# Patient Record
Sex: MALE | Race: OTHER
[De-identification: names, ages, dates, MRNs, and addresses within clinical notes are randomized per-mention and may not be internally consistent; named-entity substitution may affect disease eponyms.]

---

## 2018-09-11 ENCOUNTER — HOSPITAL ENCOUNTER (OUTPATIENT)
Dept: HOSPITAL 47 - OR | Age: 73
End: 2018-09-11
Attending: SURGERY
Payer: MEDICARE

## 2018-09-11 VITALS — SYSTOLIC BLOOD PRESSURE: 178 MMHG | HEART RATE: 71 BPM | DIASTOLIC BLOOD PRESSURE: 91 MMHG

## 2018-09-11 VITALS — BODY MASS INDEX: 35.4 KG/M2

## 2018-09-11 VITALS — TEMPERATURE: 97.6 F | RESPIRATION RATE: 16 BRPM

## 2018-09-11 DIAGNOSIS — K42.0: Primary | ICD-10-CM

## 2018-09-11 DIAGNOSIS — Z79.82: ICD-10-CM

## 2018-09-11 DIAGNOSIS — I11.0: ICD-10-CM

## 2018-09-11 DIAGNOSIS — I20.9: ICD-10-CM

## 2018-09-11 DIAGNOSIS — Z86.73: ICD-10-CM

## 2018-09-11 DIAGNOSIS — Z79.899: ICD-10-CM

## 2018-09-11 DIAGNOSIS — M19.90: ICD-10-CM

## 2018-09-11 DIAGNOSIS — F17.200: ICD-10-CM

## 2018-09-11 DIAGNOSIS — E78.5: ICD-10-CM

## 2018-09-11 DIAGNOSIS — I50.9: ICD-10-CM

## 2018-09-11 DIAGNOSIS — Z79.4: ICD-10-CM

## 2018-09-11 DIAGNOSIS — E11.9: ICD-10-CM

## 2018-09-11 LAB — GLUCOSE BLD-MCNC: 98 MG/DL (ref 75–99)

## 2018-09-11 PROCEDURE — 88302 TISSUE EXAM BY PATHOLOGIST: CPT

## 2018-09-11 PROCEDURE — 88305 TISSUE EXAM BY PATHOLOGIST: CPT

## 2018-09-11 PROCEDURE — 49653: CPT

## 2018-09-11 RX ADMIN — HYDROMORPHONE HYDROCHLORIDE PRN MG: 1 INJECTION, SOLUTION INTRAMUSCULAR; INTRAVENOUS; SUBCUTANEOUS at 13:15

## 2018-09-11 RX ADMIN — HYDROMORPHONE HYDROCHLORIDE PRN MG: 1 INJECTION, SOLUTION INTRAMUSCULAR; INTRAVENOUS; SUBCUTANEOUS at 13:36

## 2018-09-11 RX ADMIN — HYDROMORPHONE HYDROCHLORIDE PRN MG: 1 INJECTION, SOLUTION INTRAMUSCULAR; INTRAVENOUS; SUBCUTANEOUS at 12:52

## 2018-09-11 NOTE — P.GSHP
History of Present Illness


H&P Date: 09/11/18


Chief Complaint: Umbilical hernia





This a 73-year-old male has developed complaints of umbilical pain.  He seen in 

the office and found have a reducible umbilical hernia.  Patient presents today 

for laparoscopic robotic-assisted repair of umbilical hernia.





Past Medical History


Past Medical History: Chest Pain / Angina, Heart Failure, CVA/TIA, Diabetes 

Mellitus, Hyperlipidemia, Hypertension, Osteoarthritis (OA)


Additional Past Medical History / Comment(s): TIA-no effects, frequent 

urination and bowel movements


History of Any Multi-Drug Resistant Organisms: None Reported


Past Surgical History: No Surgical Hx Reported


Past Anesthesia/Blood Transfusion Reactions: No Reported Reaction


Additional Past Anesthesia/Blood Transfusion Reaction / Comment(s): never had 

anesthesia


Smoking Status: Current every day smoker





- Past Family History


  ** Mother


Family Medical History: Cancer


Additional Family Medical History / Comment(s): breast cancer





  ** Father


Family Medical History: Unable to Obtain





  ** Sister(s)


Family Medical History: Cancer





Medications and Allergies


 Home Medications











 Medication  Instructions  Recorded  Confirmed  Type


 


Benazepril HCl 40 mg PO W/SUPPER 12/03/17 09/05/18 History


 


glipiZIDE [Glucotrol] 20 mg PO W/LUNCH 12/03/17 09/05/18 History


 


Aspirin 81 mg PO DAILY 12/04/17 09/05/18 History


 


Insulin Glargine [Lantus] 45 unit SQ DAILY 12/04/17 09/05/18 History


 


Tamsulosin [Flomax] 0.4 mg PO HS 12/04/17 09/05/18 History


 


metFORMIN HCL ER [Glucophage Xr] 500 mg PO QID 12/04/17 09/05/18 History


 


Carvedilol [Coreg] 6.25 mg PO BID 09/05/18 09/11/18 History


 


Furosemide [Lasix] 40 mg PO BID 09/05/18 09/05/18 History


 


Isosorbide Mononitrate ER [Imdur] 30 mg PO W/LUNCH 09/05/18 09/05/18 History


 


Lovastatin [Mevacor] 40 mg PO HS 09/05/18 09/05/18 History


 


Spironolactone [Aldactone] 25 mg PO W/SUPPER 09/05/18 09/05/18 History











 Allergies











Allergy/AdvReac Type Severity Reaction Status Date / Time


 


No Known Allergies Allergy   Verified 09/11/18 10:26














Surgical - Exam


 Vital Signs











Temp Pulse Resp BP Pulse Ox


 


 97.8 F   69   18   172/77   96 


 


 09/11/18 10:18  09/11/18 10:18  09/11/18 10:18  09/11/18 10:18  09/11/18 10:18














- General


well developed, no distress





- Eyes


PERRL





- ENT


normal pinna





- Neck


no masses





- Respiratory


normal expansion





- Cardiovascular


Rhythm: regular





- Abdomen


Abdomen: soft, non tender


Hernia: umbilical





Assessment and Plan


Assessment: 





We'll perform laparoscopic robotic system repair of umbilical hernia.

## 2018-09-11 NOTE — P.OP
Date of Procedure: 09/11/18


Preoperative Diagnosis: 


Incarcerated umbilical hernia


Postoperative Diagnosis: 


Incarcerated umbilical hernia


Procedure(s) Performed: 


Laparoscopic robotic-assisted repair of incarcerated hernia





Partial omentectomy


Anesthesia: ROSE


Surgeon: Ish Gaitan


Estimated Blood Loss (ml): 5


Pathology: other (Omentum)


Condition: stable


Disposition: PACU


Description of Procedure: 


The patient was placed on the operating table in the supine position.  He 

received general anesthesia.  His abdomen was prepped and draped usual fashion.

  Using a 5 mm optical trocar under direct visualization the peritoneal cavity 

was entered in the left upper quadrant.  The abdomen was then insufflated.  The 

laparoscope was placed back into the perineal cavity.  Next a 8 mm robotic 

trocar was placed in the left lower quadrant and a 12 mm robotic trocar was 

placed in the left lateral position.  The original 5 mm trocar was exchanged 

for a 8 mm robotic trocar.  The patient's placed in the left side up position.  

And the patient was undocked the robot.





The umbilical hernia was visualized.  Using the cautery the incarcerated 

omentum was reduced and transected.  Using hook cautery the peritoneum over the 

umbilical hernia was excised.  The fascial opening was repaired using 0V LOC 

suture.  Next a piece of 11 cm round ventral light ST mesh was placed into the.

  Cavity and secured with 2 OV lock suture.





The patient was undocked the robot.  The needles were retrieved.  The omentum 

was retrieved.  The fascia of the 12 mm trocar site was closed with 0 Ethibond 

suture.  Skin was closed interrupted 3-0 Monocryl suture.  Dermabond dressings 

was applied.  Patient top procedure well and was sent to recovery room stable 

condition.

## 2018-11-29 ENCOUNTER — HOSPITAL ENCOUNTER (INPATIENT)
Dept: HOSPITAL 47 - EC | Age: 73
LOS: 4 days | Discharge: HOME | DRG: 291 | End: 2018-12-03
Payer: MEDICARE

## 2018-11-29 VITALS — BODY MASS INDEX: 32 KG/M2

## 2018-11-29 DIAGNOSIS — E11.22: ICD-10-CM

## 2018-11-29 DIAGNOSIS — E78.5: ICD-10-CM

## 2018-11-29 DIAGNOSIS — I13.0: Primary | ICD-10-CM

## 2018-11-29 DIAGNOSIS — Z79.4: ICD-10-CM

## 2018-11-29 DIAGNOSIS — T50.2X5A: ICD-10-CM

## 2018-11-29 DIAGNOSIS — I25.5: ICD-10-CM

## 2018-11-29 DIAGNOSIS — Z80.3: ICD-10-CM

## 2018-11-29 DIAGNOSIS — E66.9: ICD-10-CM

## 2018-11-29 DIAGNOSIS — Z86.73: ICD-10-CM

## 2018-11-29 DIAGNOSIS — I16.1: ICD-10-CM

## 2018-11-29 DIAGNOSIS — Z79.82: ICD-10-CM

## 2018-11-29 DIAGNOSIS — I50.23: ICD-10-CM

## 2018-11-29 DIAGNOSIS — I25.10: ICD-10-CM

## 2018-11-29 DIAGNOSIS — I08.3: ICD-10-CM

## 2018-11-29 DIAGNOSIS — N17.9: ICD-10-CM

## 2018-11-29 DIAGNOSIS — I25.2: ICD-10-CM

## 2018-11-29 DIAGNOSIS — J96.01: ICD-10-CM

## 2018-11-29 DIAGNOSIS — F17.200: ICD-10-CM

## 2018-11-29 DIAGNOSIS — Z79.899: ICD-10-CM

## 2018-11-29 DIAGNOSIS — N18.9: ICD-10-CM

## 2018-11-29 DIAGNOSIS — Z91.128: ICD-10-CM

## 2018-11-29 DIAGNOSIS — T50.2X6A: ICD-10-CM

## 2018-11-29 DIAGNOSIS — D50.9: ICD-10-CM

## 2018-11-29 DIAGNOSIS — M19.90: ICD-10-CM

## 2018-11-29 DIAGNOSIS — Z71.6: ICD-10-CM

## 2018-11-29 DIAGNOSIS — I44.7: ICD-10-CM

## 2018-11-29 DIAGNOSIS — E78.00: ICD-10-CM

## 2018-11-29 LAB
ALBUMIN SERPL-MCNC: 3.1 G/DL (ref 3.5–5)
ALP SERPL-CCNC: 134 U/L (ref 38–126)
ALT SERPL-CCNC: 31 U/L (ref 21–72)
ANION GAP SERPL CALC-SCNC: 8 MMOL/L
APTT BLD: 22.5 SEC (ref 22–30)
AST SERPL-CCNC: 30 U/L (ref 17–59)
BASOPHILS # BLD AUTO: 0 K/UL (ref 0–0.2)
BASOPHILS NFR BLD AUTO: 0 %
BUN SERPL-SCNC: 25 MG/DL (ref 9–20)
CALCIUM SPEC-MCNC: 8 MG/DL (ref 8.4–10.2)
CHLORIDE SERPL-SCNC: 111 MMOL/L (ref 98–107)
CO2 SERPL-SCNC: 23 MMOL/L (ref 22–30)
EOSINOPHIL # BLD AUTO: 0.2 K/UL (ref 0–0.7)
EOSINOPHIL NFR BLD AUTO: 3 %
ERYTHROCYTE [DISTWIDTH] IN BLOOD BY AUTOMATED COUNT: 4.35 M/UL (ref 4.3–5.9)
ERYTHROCYTE [DISTWIDTH] IN BLOOD: 15.3 % (ref 11.5–15.5)
GLUCOSE BLD-MCNC: 180 MG/DL (ref 75–99)
GLUCOSE BLD-MCNC: 230 MG/DL (ref 75–99)
GLUCOSE BLD-MCNC: 263 MG/DL (ref 75–99)
GLUCOSE BLD-MCNC: 317 MG/DL (ref 75–99)
GLUCOSE SERPL-MCNC: 180 MG/DL (ref 74–99)
HBA1C MFR BLD: 6.7 % (ref 4–6)
HCT VFR BLD AUTO: 35.4 % (ref 39–53)
HGB BLD-MCNC: 10.9 GM/DL (ref 13–17.5)
INR PPP: 1.1 (ref ?–1.2)
LYMPHOCYTES # SPEC AUTO: 1.8 K/UL (ref 1–4.8)
LYMPHOCYTES NFR SPEC AUTO: 19 %
MCH RBC QN AUTO: 25 PG (ref 25–35)
MCHC RBC AUTO-ENTMCNC: 30.7 G/DL (ref 31–37)
MCV RBC AUTO: 81.4 FL (ref 80–100)
MONOCYTES # BLD AUTO: 0.6 K/UL (ref 0–1)
MONOCYTES NFR BLD AUTO: 6 %
NEUTROPHILS # BLD AUTO: 6.6 K/UL (ref 1.3–7.7)
NEUTROPHILS NFR BLD AUTO: 70 %
PH UR: 5.5 [PH] (ref 5–8)
PLATELET # BLD AUTO: 237 K/UL (ref 150–450)
POTASSIUM SERPL-SCNC: 4.8 MMOL/L (ref 3.5–5.1)
PROT SERPL-MCNC: 6.5 G/DL (ref 6.3–8.2)
PROT UR QL: (no result)
PT BLD: 10.6 SEC (ref 9–12)
RBC UR QL: 1 /HPF (ref 0–5)
SODIUM SERPL-SCNC: 142 MMOL/L (ref 137–145)
SP GR UR: 1.01 (ref 1–1.03)
SQUAMOUS UR QL AUTO: <1 /HPF (ref 0–4)
UROBILINOGEN UR QL STRIP: <2 MG/DL (ref ?–2)
WBC # BLD AUTO: 9.4 K/UL (ref 3.8–10.6)
WBC #/AREA URNS HPF: 2 /HPF (ref 0–5)

## 2018-11-29 PROCEDURE — 94660 CPAP INITIATION&MGMT: CPT

## 2018-11-29 PROCEDURE — 85610 PROTHROMBIN TIME: CPT

## 2018-11-29 PROCEDURE — 83540 ASSAY OF IRON: CPT

## 2018-11-29 PROCEDURE — 96374 THER/PROPH/DIAG INJ IV PUSH: CPT

## 2018-11-29 PROCEDURE — 87077 CULTURE AEROBIC IDENTIFY: CPT

## 2018-11-29 PROCEDURE — 5A09457 ASSISTANCE WITH RESPIRATORY VENTILATION, 24-96 CONSECUTIVE HOURS, CONTINUOUS POSITIVE AIRWAY PRESSURE: ICD-10-PCS

## 2018-11-29 PROCEDURE — 87186 SC STD MICRODIL/AGAR DIL: CPT

## 2018-11-29 PROCEDURE — 83735 ASSAY OF MAGNESIUM: CPT

## 2018-11-29 PROCEDURE — 87040 BLOOD CULTURE FOR BACTERIA: CPT

## 2018-11-29 PROCEDURE — 87086 URINE CULTURE/COLONY COUNT: CPT

## 2018-11-29 PROCEDURE — 71045 X-RAY EXAM CHEST 1 VIEW: CPT

## 2018-11-29 PROCEDURE — 85025 COMPLETE CBC W/AUTO DIFF WBC: CPT

## 2018-11-29 PROCEDURE — 81001 URINALYSIS AUTO W/SCOPE: CPT

## 2018-11-29 PROCEDURE — 83550 IRON BINDING TEST: CPT

## 2018-11-29 PROCEDURE — 85730 THROMBOPLASTIN TIME PARTIAL: CPT

## 2018-11-29 PROCEDURE — 87502 INFLUENZA DNA AMP PROBE: CPT

## 2018-11-29 PROCEDURE — 82728 ASSAY OF FERRITIN: CPT

## 2018-11-29 PROCEDURE — 96376 TX/PRO/DX INJ SAME DRUG ADON: CPT

## 2018-11-29 PROCEDURE — 83605 ASSAY OF LACTIC ACID: CPT

## 2018-11-29 PROCEDURE — 80053 COMPREHEN METABOLIC PANEL: CPT

## 2018-11-29 PROCEDURE — 83036 HEMOGLOBIN GLYCOSYLATED A1C: CPT

## 2018-11-29 PROCEDURE — 93306 TTE W/DOPPLER COMPLETE: CPT

## 2018-11-29 PROCEDURE — 84484 ASSAY OF TROPONIN QUANT: CPT

## 2018-11-29 PROCEDURE — 93005 ELECTROCARDIOGRAM TRACING: CPT

## 2018-11-29 PROCEDURE — 36415 COLL VENOUS BLD VENIPUNCTURE: CPT

## 2018-11-29 PROCEDURE — 99291 CRITICAL CARE FIRST HOUR: CPT

## 2018-11-29 RX ADMIN — FUROSEMIDE SCH MG: 10 INJECTION, SOLUTION INTRAMUSCULAR; INTRAVENOUS at 06:36

## 2018-11-29 RX ADMIN — FUROSEMIDE SCH MG: 10 INJECTION, SOLUTION INTRAMUSCULAR; INTRAVENOUS at 20:26

## 2018-11-29 RX ADMIN — INSULIN DETEMIR SCH UNIT: 100 INJECTION, SOLUTION SUBCUTANEOUS at 13:02

## 2018-11-29 RX ADMIN — ATORVASTATIN CALCIUM SCH MG: 10 TABLET, FILM COATED ORAL at 20:26

## 2018-11-29 RX ADMIN — HEPARIN SODIUM SCH UNIT: 5000 INJECTION, SOLUTION INTRAVENOUS; SUBCUTANEOUS at 20:26

## 2018-11-29 RX ADMIN — INSULIN ASPART SCH: 100 INJECTION, SOLUTION INTRAVENOUS; SUBCUTANEOUS at 20:39

## 2018-11-29 RX ADMIN — CARVEDILOL SCH MG: 6.25 TABLET, FILM COATED ORAL at 10:50

## 2018-11-29 RX ADMIN — INSULIN ASPART SCH UNIT: 100 INJECTION, SOLUTION INTRAVENOUS; SUBCUTANEOUS at 13:00

## 2018-11-29 RX ADMIN — FUROSEMIDE SCH MG: 10 INJECTION, SOLUTION INTRAMUSCULAR; INTRAVENOUS at 13:03

## 2018-11-29 RX ADMIN — SPIRONOLACTONE SCH MG: 25 TABLET, FILM COATED ORAL at 19:44

## 2018-11-29 RX ADMIN — LISINOPRIL SCH MG: 20 TABLET ORAL at 19:44

## 2018-11-29 RX ADMIN — INSULIN ASPART SCH UNIT: 100 INJECTION, SOLUTION INTRAVENOUS; SUBCUTANEOUS at 10:27

## 2018-11-29 RX ADMIN — CARVEDILOL SCH MG: 6.25 TABLET, FILM COATED ORAL at 19:44

## 2018-11-29 RX ADMIN — INSULIN ASPART SCH UNIT: 100 INJECTION, SOLUTION INTRAVENOUS; SUBCUTANEOUS at 20:38

## 2018-11-29 RX ADMIN — TAMSULOSIN HYDROCHLORIDE SCH MG: 0.4 CAPSULE ORAL at 20:26

## 2018-11-29 NOTE — P.CRDCN
History of Present Illness


Consult date: 11/29/18


History of present illness: 





This is a 73-year-old gentleman with history of hypertension, chronic 

congestive heart failure and known ischemic heart disease with previous 

inferior wall myocardial infarction, who was brought to the hospital this time 

with complaints of increasing shortness of breath.  Apparently patient stopped 

taking diuretics for about a week or so and has been eating salty food.  He 

developed a progressively increasing shortness of breath and orthopnea.  Denied 

any chest pain, palpitation, dizziness or syncope.  His EKG showed sinus rhythm 

with newly found left bundle branch block pattern.  Chest x-ray showed findings 

consistent with pulmonary edema.  Patient was treated with IV Lasix with good 

diuresis.  Patient is feeling better.  Patient is going to have an 

echocardiogram.  We'll follow his cardiac enzymes.  Further recommendations 

depend upon the clinical course.





Review of Systems





As per the chart





Past Medical History


Past Medical History: Chest Pain / Angina, Heart Failure, CVA/TIA, Diabetes 

Mellitus, Hyperlipidemia, Hypertension, Osteoarthritis (OA)


Additional Past Medical History / Comment(s): TIA, IDDM type II.


History of Any Multi-Drug Resistant Organisms: None Reported


Past Surgical History: Hernia Repair


Additional Past Surgical History / Comment(s): 9/11/18 robot assisted 

laprascopic umbilical hernia repair with mesh.


Past Anesthesia/Blood Transfusion Reactions: No Reported Reaction


Additional Past Anesthesia/Blood Transfusion Reaction / Comment(s): never had 

anesthesia


Smoking Status: Light tobacco smoker





- Past Family History


  ** Mother


Family Medical History: Cancer


Additional Family Medical History / Comment(s): breast cancer





  ** Father


Family Medical History: Unable to Obtain





  ** Sister(s)


Family Medical History: Cancer





Medications and Allergies


 Home Medications











 Medication  Instructions  Recorded  Confirmed  Type


 


Benazepril HCl 40 mg PO DAILY 12/03/17 11/29/18 History


 


glipiZIDE [Glucotrol] 20 mg PO W/BRKFST 12/03/17 11/29/18 History


 


Aspirin 81 mg PO DAILY 12/04/17 11/29/18 History


 


Insulin Glargine [Lantus] 40 unit SQ DAILY 12/04/17 11/29/18 History


 


Tamsulosin [Flomax] 0.4 mg PO HS 12/04/17 11/29/18 History


 


metFORMIN HCL ER [Glucophage Xr] 1,000 mg PO BID 12/04/17 11/29/18 History


 


Carvedilol [Coreg] 6.25 mg PO BID 09/05/18 11/29/18 History


 


Furosemide [Lasix] 40 mg PO BID 09/05/18 11/29/18 History


 


Isosorbide Mononitrate ER [Imdur] 30 mg PO DAILY 09/05/18 11/29/18 History


 


Lovastatin [Mevacor] 20 mg PO HS 09/05/18 11/29/18 History


 


Spironolactone [Aldactone] 25 mg PO DAILY 09/05/18 11/29/18 History











 Allergies











Allergy/AdvReac Type Severity Reaction Status Date / Time


 


No Known Allergies Allergy   Verified 11/29/18 08:52














Physical Exam


Vitals: 


 Vital Signs











  Temp Pulse Resp BP Pulse Ox


 


 11/29/18 13:05   86  20  189/87  95


 


 11/29/18 10:25   85  18  180/96  95


 


 11/29/18 07:00   84  23  163/87  99


 


 11/29/18 06:00   79  12  142/66  96


 


 11/29/18 05:00   82  19  123/62  96


 


 11/29/18 04:15   90  19  106/93  98


 


 11/29/18 04:00   92  19  132/70  97


 


 11/29/18 03:08   103 H  21  170/94  99


 


 11/29/18 03:00   107 H  10 L  170/94  99


 


 11/29/18 02:47    27 H  


 


 11/29/18 02:34   117 H  29 H  201/114  99


 


 11/29/18 02:30  97.4 F L  121 H  27 H  201/114  98








 Intake and Output











 11/28/18 11/29/18 11/29/18





 22:59 06:59 14:59


 


Output Total   4900


 


Balance   -4900


 


Output:   


 


  Urine   4000


 


  Urine/Stool Mix   900


 


Other:   


 


  Weight  106.594 kg 














GENERAL EXAM: Patient is alert and oriented and appears to be in mild-to-

moderate distress, normal


HEENT: Normocephalic. Normal reaction of pupils, equal size, normal range of 

extraocular motion. No erythema or exudates in the throat.


NECK: No masses, no nuchal rigidity.


CHEST: No chest wall deformity.


LUNGS: Decreased breath sounds at bases.  Few rales


HEART: [S1 and S2 normal with no audible mumurs or gallops. Regular rhythm,


ABDOMEN: No hepatosplenomegaly, normal bowel sounds, no guarding or rigidity.


SKIN: No rashes


CENTRAL NERVOUS SYSTEM: No focal deficits.


EXTREMITIES: No cyanosis, clubbing or edema.





Results





 11/29/18 02:33





 11/29/18 02:33


 Cardiac Enzymes











  11/29/18 11/29/18 Range/Units





  02:33 02:33 


 


AST  30   (17-59)  U/L


 


Troponin I   <0.012  (0.000-0.034)  ng/mL








 Coagulation











  11/29/18 Range/Units





  02:33 


 


PT  10.6  (9.0-12.0)  sec


 


APTT  22.5  (22.0-30.0)  sec








 CBC











  11/29/18 Range/Units





  02:33 


 


WBC  9.4  (3.8-10.6)  k/uL


 


RBC  4.35  (4.30-5.90)  m/uL


 


Hgb  10.9 L  (13.0-17.5)  gm/dL


 


Hct  35.4 L  (39.0-53.0)  %


 


Plt Count  237  (150-450)  k/uL








 Comprehensive Metabolic Panel











  11/29/18 Range/Units





  02:33 


 


Sodium  142  (137-145)  mmol/L


 


Potassium  4.8  (3.5-5.1)  mmol/L


 


Chloride  111 H  ()  mmol/L


 


Carbon Dioxide  23  (22-30)  mmol/L


 


BUN  25 H  (9-20)  mg/dL


 


Creatinine  1.19  (0.66-1.25)  mg/dL


 


Glucose  180 H  (74-99)  mg/dL


 


Calcium  8.0 L  (8.4-10.2)  mg/dL


 


AST  30  (17-59)  U/L


 


ALT  31  (21-72)  U/L


 


Alkaline Phosphatase  134 H  ()  U/L


 


Total Protein  6.5  (6.3-8.2)  g/dL


 


Albumin  3.1 L  (3.5-5.0)  g/dL








 Current Medications











Generic Name Dose Route Start Last Admin





  Trade Name Freq  PRN Reason Stop Dose Admin


 


Hydrocodone Bitart/Acetaminophen  1 each  11/29/18 04:47  





  Almond 7.5-325  PO   





  Q4H PRN   





  Pain   





     





     





     


 


Aspirin  325 mg  11/30/18 04:47  





  Aspirin  PO   





  DAILY [facility]   





     





     





     





     


 


Atorvastatin Calcium  10 mg  11/29/18 21:00  





  Lipitor  PO   





  HS [facility]   





     





     





     





     


 


Carvedilol  6.25 mg  11/29/18 07:30  11/29/18 10:50





  Coreg  PO   6.25 mg





  BID-W/MEALS [facility]   Administration





     





     





     





     


 


Famotidine  20 mg  11/30/18 09:00  





  Pepcid  PO   





  DAILY [facility]   





     





     





     





     


 


Furosemide  40 mg  11/29/18 05:00  11/29/18 13:03





  Lasix  IV   40 mg





  Q8H [facility]   Administration





     





     





     





     


 


Glipizide  20 mg  11/30/18 07:30  





  Glucotrol  PO   





  W/BRKFST [facility]   





     





     





     





     


 


Heparin Sodium (Porcine)  5,000 unit  11/29/18 21:00  





  Heparin  SQ   





  Q12HR [facility]   





     





     





     





     


 


Insulin Aspart  0 unit  11/29/18 07:30  11/29/18 13:00





  Novolog  SQ   7 unit





  [facility]   Administration





     





     





  Protocol   





     


 


Insulin Detemir  40 unit  11/29/18 12:00  11/29/18 13:02





  Levemir  SQ   40 unit





  DAILY [facility]   Administration





     





     





     





     


 


Isosorbide Mononitrate  30 mg  11/30/18 09:00  





  Imdur  PO   





  DAILY [facility]   





     





     





     





     


 


Lisinopril  40 mg  11/29/18 17:30  





  Zestril  PO   





  W/SUPPER [facility]   





     





     





     





     


 


Naloxone HCl  0.2 mg  11/29/18 04:45  





  Narcan  IV   





  Q2M PRN   





  Opioid Reversal   





     





     





     


 


Spironolactone  25 mg  11/29/18 17:30  





  Aldactone  PO   





  W/SUPPER [facility]   





     





     





     





     


 


Tamsulosin HCl  0.4 mg  11/29/18 21:00  





  Flomax  PO   





  HS [facility]   





     





     





     





     








 Intake and Output











 11/28/18 11/29/18 11/29/18





 22:59 06:59 14:59


 


Output Total   4900


 


Balance   -4900


 


Output:   


 


  Urine   4000


 


  Urine/Stool Mix   900


 


Other:   


 


  Weight  106.594 kg 








 





 11/29/18 02:33 





 11/29/18 02:33 











EKG Interpretations (text)





Sinus rhythm with a left bundle branch block pattern





Assessment and Plan


(1) Pulmonary edema


Current Visit: Yes   Status: Acute   Code(s): J81.1 - CHRONIC PULMONARY EDEMA   

SNOMED Code(s): 28752386


   





(2) Left bundle branch block


Current Visit: Yes   Status: Acute   Code(s): I44.7 - LEFT BUNDLE-BRANCH BLOCK, 

UNSPECIFIED   SNOMED Code(s): 35716901


   





(3) Coronary artery disease


Current Visit: Yes   Status: Acute   Code(s): I25.10 - ATHSCL HEART DISEASE OF 

NATIVE CORONARY ARTERY W/O ANG PCTRS   SNOMED Code(s): 35124113


   





(4) Ischemic cardiomyopathy


Current Visit: Yes   Status: Acute   Code(s): I25.5 - ISCHEMIC CARDIOMYOPATHY   

SNOMED Code(s): 357279711


   





(5) Hypertension, essential


Current Visit: Yes   Status: Acute   Code(s): I10 - ESSENTIAL (PRIMARY) 

HYPERTENSION   SNOMED Code(s): 97295328


   





(6) Hypercholesterolemia


Current Visit: Yes   Status: Acute   Code(s): E78.00 - PURE HYPERCHOLESTEROLEMIA

, UNSPECIFIED   SNOMED Code(s): 16989896


   


Plan: 


We'll continue with current medical therapy with IV diuretics, Coreg, ACE 

inhibitor and also Aldactone.  We'll follow Cardec enzymes studies.  We'll 

repeat the echocardiogram.  Further recommendation depend upon the clinical 

course

## 2018-11-29 NOTE — XR
EXAMINATION TYPE: XR chest 1V portable

 

DATE OF EXAM: 11/29/2018

 

COMPARISON: 12/5/2017

 

HISTORY: Heart failure short of breath

 

TECHNIQUE: Single frontal view of the chest is obtained.

 

FINDINGS:  Heart is enlarged. There is pulmonary interstitial edema. Thoracic aorta is atheromatous. 
There are chest leads. I see no pleural fluid.

 

IMPRESSION:  There is new pulmonary interstitial edema compared to old exam and suggestive of acute c
ongestive heart failure.

## 2018-11-29 NOTE — ECHOF
Referral Reason:CHF, check EF



MEASUREMENTS

--------

HEIGHT: 180.3 cm

WEIGHT: 106.6 kg

BP: 

IVSd:   1.5 cm     (0.6 - 1.1)

LVIDd:   5.2 cm     (3.9 - 5.3)

LVPWd:   1.3 cm     (0.6 - 1.1)

IVSs:   2.1 cm

LVIDs:   4.2 cm

LVPWs:   1.8 cm

Ao Diam:   2.7 cm     (2.0 - 3.7)

AV Cusp:   1.2 cm     (1.5 - 2.6)

LA Diam:   3.2 cm     (2.7 - 3.8)

MV EXCURSION:   12.148 mm     (> 18.000)

MV EF SLOPE:   48 mm/s     (70 - 150)

EPSS:   1.4 cm

MV E Manuel:   0.87 m/s

MV DecT:   121 ms

MV A Manuel:   0.77 m/s

MV E/A Ratio:   1.14 

AV maxP.13 mmHg

AV meanPG:   10.09 mmHg

RAP:   5.00 mmHg

RVSP:   10.25 mmHg







FINDINGS

--------

Sinus rhythm.

This was a technically difficult study with suboptimal views.

The left ventricular size is normal.   There is moderate concentric left ventricular hypertrophy.   O
verall left ventricular systolic function is mild-moderately impaired with, an EF between 40 - 45 %. 
  Sigmoid shaped septum with focal hypertrophy of the basal septum. The remaining wall thickness is n
ormal.   Basal lateral LV wall motion is hypokinetic.    Basal inferior LV wall motion is hypokinetic
.    Mid lateral LV wall motion is hypokinetic.    Mid inferior LV wall motion is hypokinetic.    Api
azul inferior LV wall motion is hypokinetic.

The right ventricle is normal in size and function.

The left atrium is normal in size.

The right atrium is normal in size.

Lumason used

Aortic valve is trileaflet and is mildly thickened.   There is mild aortic stenosis present.   Peak/m
zion gradient across the Aortic Valve is 18.13mmHg / 10.09mmHg.

Mild mitral annular calcification present.   Mild mitral regurgitation is present.

Mild tricuspid regurgitation present.   There is no evidence of pulmonary hypertension.   The right v
entricular systolic pressure, as measured by Doppler, is 10.25mmHg.

The pulmonic valve was not well visualized.   There is no pulmonic regurgitation present.

There is no pericardial effusion.



CONCLUSIONS

--------

1. Sinus rhythm.

2. This was a technically difficult study with suboptimal views.

3. The left ventricular size is normal.

4. There is moderate concentric left ventricular hypertrophy.

5. Overall left ventricular systolic function is mild-moderately impaired with, an EF between 40 - 45
 %.

6. Sigmoid shaped septum with focal hypertrophy of the basal septum. The remaining wall thickness is 
normal.

7. Basal lateral LV wall motion is hypokinetic.

8. Basal inferior LV wall motion is hypokinetic.

9. Mid lateral LV wall motion is hypokinetic.

10. Mid inferior LV wall motion is hypokinetic.

11. Apical inferior LV wall motion is hypokinetic.

12. The left atrium is normal in size.

13. Lumason used

14. Aortic valve is trileaflet and is mildly thickened.

15. There is mild aortic stenosis present.

16. Peak/mean gradient across the Aortic Valve is 18.13mmHg / 10.09mmHg.

17. Mild mitral annular calcification present.

18. Mild mitral regurgitation is present.

19. Mild tricuspid regurgitation present.

20. There is no evidence of pulmonary hypertension.

21. There is no pulmonic regurgitation present.

22. There is no pericardial effusion.





SONOGRAPHER: Yaima Navarrete RDCS

## 2018-11-29 NOTE — P.HPIM
History of Present Illness


H&P Date: 11/29/18


Chief Complaint: Shortness of breath





This is a 73-year-old male, patient of Dr. Grimaldo.  Patient has a known past 

medical history of congestive heart failure, TIA, diabetes, hypertension, 

hyperlipidemia, osteoarthritis and nicotine dependence.  Patient presents to 

the emergency room with complaints of shortness of breath and evidence of 

congestive heart failure.  Patient reports the shortness of breath started 

around midnight last night.  He was concerned and called EMS.  Per ER reports 

upon EMS evaluation patient was in acute respiratory failure.  Oxygen 

saturations were in the 70s and radials noted on lung exam.  He was placed on 

CPAP given nitro and DuoNeb as well as IV Solu-Medrol in route.  He required to 

be on BiPAP in the ER.  Patient was started on IV Lasix for congestive heart 

failure and pulmonary edema.  Chest x-ray showed evidence of congestive heart 

failure.  Patient did have significant elevated blood pressure and was 

tachycardic on admission.  Blood pressure 201/114 and heart rate 121.  Patient 

started on IV Lasix.  Respiratory symptoms are improving.  He is currently on 2 

L of oxygen satting at 95%.  Cardiology has been placed on consult.  Patient 

stopped taking his oral Lasix about 2 weeks ago by his physician.  Patient 

reports eating a half of sausage yesterday, in which she knows is very salty.  

And blames this for contributing to his fluid overload.  Patient denies any 

chest pain, fever, chills, sweats, nausea or vomiting, bowel movement changes 

or urinary symptoms.  Holloway catheter inserted in ER due to him being on IV 

Lasix.  Cardiology has been placed on consult for CHF exacerbation.





Review of Systems





Please refer to HPI otherwise unremarkable





Past Medical History


Past Medical History: Chest Pain / Angina, Heart Failure, CVA/TIA, Diabetes 

Mellitus, Hyperlipidemia, Hypertension, Osteoarthritis (OA)


Additional Past Medical History / Comment(s): TIA-no effects, frequent 

urination and bowel movements


History of Any Multi-Drug Resistant Organisms: None Reported


Past Surgical History: No Surgical Hx Reported


Past Anesthesia/Blood Transfusion Reactions: No Reported Reaction


Additional Past Anesthesia/Blood Transfusion Reaction / Comment(s): never had 

anesthesia


Smoking Status: Current every day smoker





- Past Family History


  ** Mother


Family Medical History: Cancer


Additional Family Medical History / Comment(s): breast cancer





  ** Father


Family Medical History: Unable to Obtain





  ** Sister(s)


Family Medical History: Cancer





Medications and Allergies


 Home Medications











 Medication  Instructions  Recorded  Confirmed  Type


 


Benazepril HCl 40 mg PO DAILY 12/03/17 11/29/18 History


 


glipiZIDE [Glucotrol] 20 mg PO W/BRKFST 12/03/17 11/29/18 History


 


Aspirin 81 mg PO DAILY 12/04/17 11/29/18 History


 


Insulin Glargine [Lantus] 40 unit SQ DAILY 12/04/17 11/29/18 History


 


Tamsulosin [Flomax] 0.4 mg PO HS 12/04/17 11/29/18 History


 


metFORMIN HCL ER [Glucophage Xr] 1,000 mg PO BID 12/04/17 11/29/18 History


 


Carvedilol [Coreg] 6.25 mg PO BID 09/05/18 11/29/18 History


 


Furosemide [Lasix] 40 mg PO BID 09/05/18 11/29/18 History


 


Isosorbide Mononitrate ER [Imdur] 30 mg PO DAILY 09/05/18 11/29/18 History


 


Lovastatin [Mevacor] 20 mg PO HS 09/05/18 11/29/18 History


 


Spironolactone [Aldactone] 25 mg PO DAILY 09/05/18 11/29/18 History











 Allergies











Allergy/AdvReac Type Severity Reaction Status Date / Time


 


No Known Allergies Allergy   Verified 11/29/18 08:52














Physical Exam


Vitals: 


 Vital Signs











  Temp Pulse Resp BP Pulse Ox


 


 11/29/18 10:25   85  18  180/96  95


 


 11/29/18 07:00   84  23  163/87  99


 


 11/29/18 06:00   79  12  142/66  96


 


 11/29/18 05:00   82  19  123/62  96


 


 11/29/18 04:15   90  19  106/93  98


 


 11/29/18 04:00   92  19  132/70  97


 


 11/29/18 03:08   103 H  21  170/94  99


 


 11/29/18 03:00   107 H  10 L  170/94  99


 


 11/29/18 02:47    27 H  


 


 11/29/18 02:34   117 H  29 H  201/114  99


 


 11/29/18 02:30  97.4 F L  121 H  27 H  201/114  98








 Intake and Output











 11/28/18 11/29/18 11/29/18





 22:59 06:59 14:59


 


Output Total   3100


 


Balance   -3100


 


Output:   


 


  Urine   3100


 


Other:   


 


  Weight  106.594 kg 














Head normocephalic


Neck supple


Lungs crackles at bases


Heart regular rate and rhythm S1-S2, no rub or gallop


Abdomen is soft nontender nondistended positive bowel sounds no 

hepatosplenomegaly


Extremities no edema


Neuro alert and orientated to 3





Results


CBC & Chem 7: 


 11/29/18 02:33





 11/29/18 02:33


Labs: 


 Abnormal Lab Results - Last 24 Hours (Table)











  11/29/18 11/29/18 11/29/18 Range/Units





  02:32 02:33 02:33 


 


Hgb   10.9 L   (13.0-17.5)  gm/dL


 


Hct   35.4 L   (39.0-53.0)  %


 


MCHC   30.7 L   (31.0-37.0)  g/dL


 


Chloride    111 H  ()  mmol/L


 


BUN    25 H  (9-20)  mg/dL


 


Glucose    180 H  (74-99)  mg/dL


 


POC Glucose (mg/dL)  180 H    (75-99)  mg/dL


 


Calcium    8.0 L  (8.4-10.2)  mg/dL


 


Alkaline Phosphatase    134 H  ()  U/L


 


Albumin    3.1 L  (3.5-5.0)  g/dL


 


Urine Protein     (Negative)  


 


Urine Mucus     (None)  /hpf














  11/29/18 11/29/18 Range/Units





  04:30 09:51 


 


Hgb    (13.0-17.5)  gm/dL


 


Hct    (39.0-53.0)  %


 


MCHC    (31.0-37.0)  g/dL


 


Chloride    ()  mmol/L


 


BUN    (9-20)  mg/dL


 


Glucose    (74-99)  mg/dL


 


POC Glucose (mg/dL)   230 H  (75-99)  mg/dL


 


Calcium    (8.4-10.2)  mg/dL


 


Alkaline Phosphatase    ()  U/L


 


Albumin    (3.5-5.0)  g/dL


 


Urine Protein  2+ H   (Negative)  


 


Urine Mucus  Rare H   (None)  /hpf








 Microbiology - Last 24 Hours (Table)











 11/29/18 04:30 Urine Culture - Preliminary





 Urine,Voided 














Assessment and Plan


Assessment: 





1.  Acute hypoxic respiratory failure secondary to congestive heart failure 

exacerbation and pulmonary edema





2.  Acute on chronic systolic CHF exacerbation: Patient started on IV Lasix 40 

mg IV every 8 hours.  Cardiology on consult.  Check 2-D echo.  Echo from 

December 2017 shows an EF of 40-45%





3.  History of diabetes mellitus type 2: Check A1c.  Resume patient's Lantus 

and glipizide.  Hold metformin during hospitalization.  Add Humalog sliding 

scale coverage.





4.  Essential hypertension





5.  Hypertensive emergency on admission likely due to patient's respiratory 

status.  Patient's blood pressures have improved.





6.  Sinus tachycardia again likely related to patient's respiratory status now 

improved





8.  History of TIA





9.  History of hyperlipidemia





10.  Nicotine dependence: Discussed smoking cessation for greater than 3 

minutes.  Patient refusing nicotine patch at this time.





11.  Anemia: Hemoglobin 10.9 on admission.  No evidence of bleeding.  Check 

iron studies.





GI prophylaxis Pepcid and DVT prophylaxis subcu heparin


Time with Patient: Greater than 30 (Greater than 60% of the total time spent in 

counseling and coordination of care.I performed an examination of the patient 

and discussed their management with the physician Assistant.  I have reviewed 

the Physician Assistant's notes and agree with the documented findings and plan 

of care)

## 2018-11-29 NOTE — ED
General Adult HPI





- General


Stated complaint: SOB


Time Seen by Provider: 11/29/18 02:35





- History of Present Illness


Initial comments: 


 is a 73-year-old  male with a history of congestive heart failure 

who is brought to the ED today via EMS for evaluation of acute respiratory 

failure.  Per EMS they were dispatched for complaint of respiratory distress, 

they found the patient in respiratory distress with oxygen saturations in the 

70s, patient was tachypneic and had wails in all lung fields.  He was placed on 

CPAP given nitro as well as DuoNeb and Solu-Medrol in route to the hospital 

with improvement in his oxygenation to the high 80s.  Patient was awake and 

alert though in respiratory distress which limited his ability to speak however 

he could nod his head and provide minimal history.  He denied chest pain or 

palpitations.  He stated that this came on suddenly.





Patient's son arrived at bedside to provide further history on the medication 

list.  Patient's son states that seen by a physician who discontinued his Lasix 

approximately a couple weeks ago however the patient does continue to take 

Aldactone for a diuretic.  Patient's medications are managed primarily by his 

wife who provides him with his appropriate pills throughout the day.  Son and 

wife do admit that the patient does not always take all of his pills, he is 

somewhat overwhelmed by his pill burden.  He has been taking most of his pills 

lately.  He reports the patient was in his usual state of health when he went 

to bed last evening and woke during the night in acute distress.  Son does 

state that this exact situation occurred approximately one year ago requiring 

admission to the hospital at that time however at that time the patient did not 

require any airway support including BiPAP or CPAP to the son's recollection.








- Related Data


 Home Medications











 Medication  Instructions  Recorded  Confirmed


 


Benazepril HCl 40 mg PO W/SUPPER 12/03/17 09/05/18


 


glipiZIDE [Glucotrol] 20 mg PO W/LUNCH 12/03/17 09/05/18


 


Aspirin 81 mg PO DAILY 12/04/17 09/05/18


 


Insulin Glargine [Lantus] 45 unit SQ DAILY 12/04/17 09/05/18


 


Tamsulosin [Flomax] 0.4 mg PO HS 12/04/17 09/05/18


 


metFORMIN HCL ER [Glucophage Xr] 500 mg PO QID 12/04/17 09/05/18


 


Carvedilol [Coreg] 6.25 mg PO BID 09/05/18 09/11/18


 


Furosemide [Lasix] 40 mg PO BID 09/05/18 09/05/18


 


Isosorbide Mononitrate ER [Imdur] 30 mg PO W/LUNCH 09/05/18 09/05/18


 


Lovastatin [Mevacor] 40 mg PO HS 09/05/18 09/05/18


 


Spironolactone [Aldactone] 25 mg PO W/SUPPER 09/05/18 09/05/18








 Previous Rx's











 Medication  Instructions  Recorded


 


Docusate [Colace] 100 mg PO BID #20 capsule 09/11/18


 


HYDROcodone/APAP 7.5-325MG [Norco 1 tab PO Q4H PRN 3 Days #18 tab 09/11/18





7.5-325]  











 Allergies











Allergy/AdvReac Type Severity Reaction Status Date / Time


 


No Known Allergies Allergy   Verified 09/11/18 10:26














Review of Systems


ROS Statement: 


Those systems with pertinent positive or pertinent negative responses have been 

documented in the HPI.





ROS Other: All systems not noted in ROS Statement are negative.





Past Medical History


Past Medical History: Chest Pain / Angina, Heart Failure, CVA/TIA, Diabetes 

Mellitus, Hyperlipidemia, Hypertension, Osteoarthritis (OA)


Additional Past Medical History / Comment(s): TIA-no effects, frequent 

urination and bowel movements


History of Any Multi-Drug Resistant Organisms: None Reported


Past Surgical History: No Surgical Hx Reported


Past Anesthesia/Blood Transfusion Reactions: No Reported Reaction


Additional Past Anesthesia/Blood Transfusion Reaction / Comment(s): never had 

anesthesia


Smoking Status: Current every day smoker





- Past Family History


  ** Mother


Family Medical History: Cancer


Additional Family Medical History / Comment(s): breast cancer





  ** Father


Family Medical History: Unable to Obtain





  ** Sister(s)


Family Medical History: Cancer





General Exam





- General Exam Comments


Initial Comments: 


GENERAL:


Moderate respiratory distress





HENT:


Normocephalic, Atraumatic. 


JVD





EYES:


The sclera were anicteric and conjunctiva were pink and moist.  Extraocular 

movements were intact and pupils were equal round and reactive to light.  

Eyelids were unremarkable.





PULMONARY:


Rales in all lung fields 





CARDIOVASCULAR:


Tachycardic, regular 





ABDOMEN:


Obese, Soft and nontender with normal bowel sounds. 





SKIN:


Ashen and diaphoretic 





NEUROLOGIC:


Patient is alert


Cranial nerves II through XII are grossly intact.  Motor and sensory are also 

intact.  Normal speech, volume and content.  Symmetrical smile. 





MUSCULOSKELETAL:


Normal extremities with adequate strength and full range of motion.  


No lower extremity swelling or edema.  No calf tenderness.  





LYMPHATICS:


No significant lymphadenopathy is noted





PSYCHIATRIC:


Normal psychiatric evaluation.  





Limitations: Respiratory distress





Course


 Vital Signs











  11/29/18 11/29/18 11/29/18





  02:30 02:47 03:08


 


Temperature 97.4 F L  


 


Pulse Rate 121 H  103 H


 


Respiratory 27 H 27 H 21





Rate   


 


Blood Pressure 201/114  170/94


 


O2 Sat by Pulse 98  99





Oximetry   














  11/29/18





  04:15


 


Temperature 


 


Pulse Rate 90


 


Respiratory 19





Rate 


 


Blood Pressure 106/93


 


O2 Sat by Pulse 98





Oximetry 














EKG Findings





- EKG Comments:


EKG Findings:: Initial EKG was obtained at 2:33 AM, there is significant 

artifact on this EKG likely related to the patient's respiratory distress, rate 

is 121, rhythm is sinus tachycardia, There are frequent PVCs appears to be 

bigeminy.  Difficult to assess for ST elevations or depressions patient's not 

currently having a chest pain will repeat EKG upon resolution of respiratory 

distress.  Repeat EKG obtained at 3:21 AM, repeat is 100, rhythm is sinus 

tachycardia, there is a left axis deviation, DC is 168, QRS is 132, QTc is 

mildly prolonged at 516.  There is no acute ST elevations, mild ST depressions 

in lateral leads.  No evidence of acute ischemia or infarction.





Medical Decision Making





- Medical Decision Making


Patient called in by EMS as a party 1 respiratory distress





The patient was seen and evaluated immediately upon arrival to the emergency 

department


Patient appears to be in flash pulmonary edema


BiPAP initiated immediately


Cardiac workup initiated





Initial EKG nondiagnostic due to respiratory distress


Patient improving with BiPAP





Labs are reviewed, troponin not elevated, electrolytes within normal limits, 

mild anemia


Chest x-ray is consistent with CHF exacerbation





Patient was reevaluated, has improved significantly with BiPAP and Nitropaste, 

blood pressure is within normal limits, heart rate in the 80s to 90s, oxygen 

saturation 100%, continues to have rales in all lung fields, producing 

significant amount of urine secondary to the IV Lasix


Patient care was discussed with Dr. Tafoya who accepts the admission for CHF 

exacerbation with a consult to cardiology








- Lab Data


Result diagrams: 


 11/29/18 02:33





 11/29/18 02:33


 Lab Results











  11/29/18 11/29/18 11/29/18 Range/Units





  02:32 02:33 02:33 


 


WBC   9.4   (3.8-10.6)  k/uL


 


RBC   4.35   (4.30-5.90)  m/uL


 


Hgb   10.9 L   (13.0-17.5)  gm/dL


 


Hct   35.4 L   (39.0-53.0)  %


 


MCV   81.4   (80.0-100.0)  fL


 


MCH   25.0   (25.0-35.0)  pg


 


MCHC   30.7 L   (31.0-37.0)  g/dL


 


RDW   15.3   (11.5-15.5)  %


 


Plt Count   237   (150-450)  k/uL


 


Neutrophils %   70   %


 


Lymphocytes %   19   %


 


Monocytes %   6   %


 


Eosinophils %   3   %


 


Basophils %   0   %


 


Neutrophils #   6.6   (1.3-7.7)  k/uL


 


Lymphocytes #   1.8   (1.0-4.8)  k/uL


 


Monocytes #   0.6   (0-1.0)  k/uL


 


Eosinophils #   0.2   (0-0.7)  k/uL


 


Basophils #   0.0   (0-0.2)  k/uL


 


Hypochromasia   Moderate   


 


PT     (9.0-12.0)  sec


 


INR     (<1.2)  


 


APTT     (22.0-30.0)  sec


 


Sodium    142  (137-145)  mmol/L


 


Potassium    4.8  (3.5-5.1)  mmol/L


 


Chloride    111 H  ()  mmol/L


 


Carbon Dioxide    23  (22-30)  mmol/L


 


Anion Gap    8  mmol/L


 


BUN    25 H  (9-20)  mg/dL


 


Creatinine    1.19  (0.66-1.25)  mg/dL


 


Est GFR (CKD-EPI)AfAm    69  (>60 ml/min/1.73 sqM)  


 


Est GFR (CKD-EPI)NonAf    60  (>60 ml/min/1.73 sqM)  


 


Glucose    180 H  (74-99)  mg/dL


 


POC Glucose (mg/dL)  180 H    (75-99)  mg/dL


 


POC Glu Operater ID  Alva Bell    


 


Plasma Lactic Acid Jesse     (0.7-2.0)  mmol/L


 


Calcium    8.0 L  (8.4-10.2)  mg/dL


 


Total Bilirubin    0.5  (0.2-1.3)  mg/dL


 


AST    30  (17-59)  U/L


 


ALT    31  (21-72)  U/L


 


Alkaline Phosphatase    134 H  ()  U/L


 


Troponin I     (0.000-0.034)  ng/mL


 


Total Protein    6.5  (6.3-8.2)  g/dL


 


Albumin    3.1 L  (3.5-5.0)  g/dL














  11/29/18 11/29/18 11/29/18 Range/Units





  02:33 02:33 02:33 


 


WBC     (3.8-10.6)  k/uL


 


RBC     (4.30-5.90)  m/uL


 


Hgb     (13.0-17.5)  gm/dL


 


Hct     (39.0-53.0)  %


 


MCV     (80.0-100.0)  fL


 


MCH     (25.0-35.0)  pg


 


MCHC     (31.0-37.0)  g/dL


 


RDW     (11.5-15.5)  %


 


Plt Count     (150-450)  k/uL


 


Neutrophils %     %


 


Lymphocytes %     %


 


Monocytes %     %


 


Eosinophils %     %


 


Basophils %     %


 


Neutrophils #     (1.3-7.7)  k/uL


 


Lymphocytes #     (1.0-4.8)  k/uL


 


Monocytes #     (0-1.0)  k/uL


 


Eosinophils #     (0-0.7)  k/uL


 


Basophils #     (0-0.2)  k/uL


 


Hypochromasia     


 


PT   10.6   (9.0-12.0)  sec


 


INR   1.1   (<1.2)  


 


APTT   22.5   (22.0-30.0)  sec


 


Sodium     (137-145)  mmol/L


 


Potassium     (3.5-5.1)  mmol/L


 


Chloride     ()  mmol/L


 


Carbon Dioxide     (22-30)  mmol/L


 


Anion Gap     mmol/L


 


BUN     (9-20)  mg/dL


 


Creatinine     (0.66-1.25)  mg/dL


 


Est GFR (CKD-EPI)AfAm     (>60 ml/min/1.73 sqM)  


 


Est GFR (CKD-EPI)NonAf     (>60 ml/min/1.73 sqM)  


 


Glucose     (74-99)  mg/dL


 


POC Glucose (mg/dL)     (75-99)  mg/dL


 


POC Glu Operater ID     


 


Plasma Lactic Acid Jesse  1.0    (0.7-2.0)  mmol/L


 


Calcium     (8.4-10.2)  mg/dL


 


Total Bilirubin     (0.2-1.3)  mg/dL


 


AST     (17-59)  U/L


 


ALT     (21-72)  U/L


 


Alkaline Phosphatase     ()  U/L


 


Troponin I    <0.012  (0.000-0.034)  ng/mL


 


Total Protein     (6.3-8.2)  g/dL


 


Albumin     (3.5-5.0)  g/dL














Critical Care Time


Critical Care Time: Yes


Total Critical Care Time: 30





Disposition


Clinical Impression: 


 Congestive heart failure





Disposition: ADMITTED AS IP TO THIS HOSP


Referrals: 


Merry Grimaldo MD [Primary Care Provider] - 1-2 days

## 2018-11-30 LAB
ALBUMIN SERPL-MCNC: 3.6 G/DL (ref 3.5–5)
ALP SERPL-CCNC: 109 U/L (ref 38–126)
ALT SERPL-CCNC: 21 U/L (ref 21–72)
ANION GAP SERPL CALC-SCNC: 12 MMOL/L
AST SERPL-CCNC: 18 U/L (ref 17–59)
BASOPHILS # BLD AUTO: 0 K/UL (ref 0–0.2)
BASOPHILS NFR BLD AUTO: 0 %
BUN SERPL-SCNC: 37 MG/DL (ref 9–20)
CALCIUM SPEC-MCNC: 9.2 MG/DL (ref 8.4–10.2)
CHLORIDE SERPL-SCNC: 104 MMOL/L (ref 98–107)
CO2 SERPL-SCNC: 28 MMOL/L (ref 22–30)
EOSINOPHIL # BLD AUTO: 0 K/UL (ref 0–0.7)
EOSINOPHIL NFR BLD AUTO: 0 %
ERYTHROCYTE [DISTWIDTH] IN BLOOD BY AUTOMATED COUNT: 4.56 M/UL (ref 4.3–5.9)
ERYTHROCYTE [DISTWIDTH] IN BLOOD: 15.4 % (ref 11.5–15.5)
GLUCOSE BLD-MCNC: 129 MG/DL (ref 75–99)
GLUCOSE BLD-MCNC: 140 MG/DL (ref 75–99)
GLUCOSE BLD-MCNC: 185 MG/DL (ref 75–99)
GLUCOSE BLD-MCNC: 74 MG/DL (ref 75–99)
GLUCOSE SERPL-MCNC: 120 MG/DL (ref 74–99)
HCT VFR BLD AUTO: 36.5 % (ref 39–53)
HGB BLD-MCNC: 11.4 GM/DL (ref 13–17.5)
LYMPHOCYTES # SPEC AUTO: 1 K/UL (ref 1–4.8)
LYMPHOCYTES NFR SPEC AUTO: 14 %
MCH RBC QN AUTO: 24.9 PG (ref 25–35)
MCHC RBC AUTO-ENTMCNC: 31.1 G/DL (ref 31–37)
MCV RBC AUTO: 80 FL (ref 80–100)
MONOCYTES # BLD AUTO: 0.5 K/UL (ref 0–1)
MONOCYTES NFR BLD AUTO: 8 %
NEUTROPHILS # BLD AUTO: 5.3 K/UL (ref 1.3–7.7)
NEUTROPHILS NFR BLD AUTO: 77 %
PLATELET # BLD AUTO: 241 K/UL (ref 150–450)
POTASSIUM SERPL-SCNC: 4.4 MMOL/L (ref 3.5–5.1)
PROT SERPL-MCNC: 7.2 G/DL (ref 6.3–8.2)
SODIUM SERPL-SCNC: 144 MMOL/L (ref 137–145)
WBC # BLD AUTO: 6.9 K/UL (ref 3.8–10.6)

## 2018-11-30 RX ADMIN — ASPIRIN 325 MG ORAL TABLET SCH MG: 325 PILL ORAL at 08:25

## 2018-11-30 RX ADMIN — INSULIN ASPART SCH: 100 INJECTION, SOLUTION INTRAVENOUS; SUBCUTANEOUS at 11:32

## 2018-11-30 RX ADMIN — CARVEDILOL SCH MG: 6.25 TABLET, FILM COATED ORAL at 16:31

## 2018-11-30 RX ADMIN — FUROSEMIDE SCH MG: 10 INJECTION, SOLUTION INTRAMUSCULAR; INTRAVENOUS at 05:31

## 2018-11-30 RX ADMIN — INSULIN ASPART SCH UNIT: 100 INJECTION, SOLUTION INTRAVENOUS; SUBCUTANEOUS at 20:32

## 2018-11-30 RX ADMIN — FUROSEMIDE SCH: 10 INJECTION, SOLUTION INTRAMUSCULAR; INTRAVENOUS at 11:31

## 2018-11-30 RX ADMIN — FUROSEMIDE SCH MG: 40 TABLET ORAL at 16:32

## 2018-11-30 RX ADMIN — LISINOPRIL SCH MG: 20 TABLET ORAL at 16:32

## 2018-11-30 RX ADMIN — HEPARIN SODIUM SCH UNIT: 5000 INJECTION, SOLUTION INTRAVENOUS; SUBCUTANEOUS at 20:18

## 2018-11-30 RX ADMIN — INSULIN DETEMIR SCH UNIT: 100 INJECTION, SOLUTION SUBCUTANEOUS at 09:34

## 2018-11-30 RX ADMIN — Medication SCH MG: at 12:12

## 2018-11-30 RX ADMIN — SPIRONOLACTONE SCH MG: 25 TABLET, FILM COATED ORAL at 16:32

## 2018-11-30 RX ADMIN — ATORVASTATIN CALCIUM SCH MG: 10 TABLET, FILM COATED ORAL at 20:18

## 2018-11-30 RX ADMIN — HEPARIN SODIUM SCH UNIT: 5000 INJECTION, SOLUTION INTRAVENOUS; SUBCUTANEOUS at 08:25

## 2018-11-30 RX ADMIN — ISOSORBIDE MONONITRATE SCH MG: 30 TABLET, EXTENDED RELEASE ORAL at 08:25

## 2018-11-30 RX ADMIN — INSULIN ASPART SCH: 100 INJECTION, SOLUTION INTRAVENOUS; SUBCUTANEOUS at 06:08

## 2018-11-30 RX ADMIN — FAMOTIDINE SCH MG: 20 TABLET, FILM COATED ORAL at 08:25

## 2018-11-30 RX ADMIN — INSULIN ASPART SCH: 100 INJECTION, SOLUTION INTRAVENOUS; SUBCUTANEOUS at 16:50

## 2018-11-30 RX ADMIN — CARVEDILOL SCH MG: 6.25 TABLET, FILM COATED ORAL at 07:02

## 2018-11-30 RX ADMIN — TAMSULOSIN HYDROCHLORIDE SCH MG: 0.4 CAPSULE ORAL at 20:18

## 2018-11-30 NOTE — P.PN
Subjective


Progress Note Date: 11/30/18





This is a 73-year-old male, patient of Dr. Grimaldo.  Patient has a known past 

medical history of congestive heart failure, myocardial infarction, TIA, 

diabetes, hypertension, hyperlipidemia, osteoarthritis and nicotine dependence.

  Patient presents to the emergency room with complaints of shortness of breath 

and evidence of congestive heart failure.  Patient reports the shortness of 

breath started around midnight last night.  He was concerned and called EMS.  

Per ER reports upon EMS evaluation patient was in acute respiratory failure.  

Oxygen saturations were in the 70s and radials noted on lung exam.  He was 

placed on CPAP given nitro and DuoNeb as well as IV Solu-Medrol in route.  He 

required to be on BiPAP in the ER.  Patient was started on IV Lasix for 

congestive heart failure and pulmonary edema.  Chest x-ray showed evidence of 

congestive heart failure.  Patient did have significant elevated blood pressure 

and was tachycardic on admission.  Blood pressure 201/114 and heart rate 121.  

Patient started on IV Lasix.  Respiratory symptoms are improving.  He is 

currently on 2 L of oxygen satting at 95%.  Cardiology has been placed on 

consult.  Patient stopped taking his oral Lasix about 2 weeks ago by his 

physician.  Patient reports eating a half of sausage yesterday, in which she 

knows is very salty.  And blames this for contributing to his fluid overload.  

Patient denies any chest pain, fever, chills, sweats, nausea or vomiting, bowel 

movement changes or urinary symptoms.  Holloway catheter inserted in ER due to him 

being on IV Lasix.  Cardiology has been placed on consult for CHF exacerbation.





11/30/18 patient's shortness of breath has improved.  He is currently off of 

oxygen.  He has been able to ambulate to the bathroom and back without 

shortness of breath.  Otherwise he has not ambulated much.  He's been on the IV 

Lasix.  Creatinine has increased to 1.32.  We'll discuss diuretics with 

cardiology service.  At this time instructed nursing staff to hold afternoon 

dose of Lasix.  Patient denies any chest pain shortness of breath nausea 

vomiting.  Reports having bowel movements.  Denies any difficulty urinating.





Objective





- Vital Signs


Vital signs: 


 Vital Signs











Temp  97.8 F   11/30/18 11:28


 


Pulse  69   11/30/18 11:28


 


Resp  18   11/30/18 11:28


 


BP  116/50   11/30/18 11:28


 


Pulse Ox  91 L  11/30/18 11:28








 Intake & Output











 11/29/18 11/30/18 11/30/18





 18:59 06:59 18:59


 


Intake Total  10 240


 


Output Total 4900 1200 400


 


Balance -4900 -1190 -160


 


Weight  107 kg 107 kg


 


Intake:   


 


  IV  10 


 


    0.9  10 


 


  Oral   240


 


Output:   


 


  Urine 4000 1200 400


 


    Uretheral (Holloway)   400


 


  Urine/Stool Mix 900  


 


Other:   


 


  Voiding Method Indwelling Catheter Indwelling Catheter Urinal














- Exam





Head normocephalic


Neck supple


Lungs a few faint crackles at the bases


Heart regular rate and rhythm S1-S2, no rub or gallop


Abdomen is soft nontender nondistended positive bowel sounds no 

hepatosplenomegaly


Extremities no edema


Neuro alert and orientated to 3





- Labs


CBC & Chem 7: 


 11/30/18 06:26





 11/30/18 06:26


Labs: 


 Abnormal Lab Results - Last 24 Hours (Table)











  11/29/18 11/29/18 11/29/18 Range/Units





  02:33 02:33 12:56 


 


Hgb     (13.0-17.5)  gm/dL


 


Hct     (39.0-53.0)  %


 


MCH     (25.0-35.0)  pg


 


BUN     (9-20)  mg/dL


 


Creatinine     (0.66-1.25)  mg/dL


 


Glucose     (74-99)  mg/dL


 


POC Glucose (mg/dL)    263 H  (75-99)  mg/dL


 


Hemoglobin A1c  6.7 H    (4.0-6.0)  %


 


Iron   21 L   ()  ug/dL


 


Iron Saturation   6.05 L   (15.00-50.00)   














  11/29/18 11/30/18 11/30/18 Range/Units





  20:35 05:43 06:26 


 


Hgb    11.4 L  (13.0-17.5)  gm/dL


 


Hct    36.5 L  (39.0-53.0)  %


 


MCH    24.9 L  (25.0-35.0)  pg


 


BUN     (9-20)  mg/dL


 


Creatinine     (0.66-1.25)  mg/dL


 


Glucose     (74-99)  mg/dL


 


POC Glucose (mg/dL)  317 H  140 H   (75-99)  mg/dL


 


Hemoglobin A1c     (4.0-6.0)  %


 


Iron     ()  ug/dL


 


Iron Saturation     (15.00-50.00)   














  11/30/18 11/30/18 Range/Units





  06:26 11:20 


 


Hgb    (13.0-17.5)  gm/dL


 


Hct    (39.0-53.0)  %


 


MCH    (25.0-35.0)  pg


 


BUN  37 H   (9-20)  mg/dL


 


Creatinine  1.32 H   (0.66-1.25)  mg/dL


 


Glucose  120 H   (74-99)  mg/dL


 


POC Glucose (mg/dL)   74 L  (75-99)  mg/dL


 


Hemoglobin A1c    (4.0-6.0)  %


 


Iron    ()  ug/dL


 


Iron Saturation    (15.00-50.00)   








 Microbiology - Last 24 Hours (Table)











 11/29/18 04:30 Urine Culture - Preliminary





 Urine,Voided    Gram Neg Bacilli


 


 11/29/18 02:33 Blood Culture - Preliminary





 Blood    No Growth after 24 hours














Assessment and Plan


Assessment: 





1.  Acute hypoxic respiratory failure secondary to congestive heart failure 

exacerbation and pulmonary edema





2.  Acute on chronic systolic CHF exacerbation: Patient started on IV Lasix 40 

mg IV every 8 hours.  Cardiology on consult.  Echo shows an EF of 40-45% with 

wall motion normality.  Also mild aortic stenosis, mild mitral regurgitation 

and tricuspid regurgitation





3.  History of diabetes mellitus type 2: A1c 6.7.  Resume patient's Lantus and 

glipizide.  Hold metformin during hospitalization.  Add Humalog sliding scale 

coverage.





4.  Essential hypertension





5.  Hypertensive urgency on admission likely due to patient's respiratory 

status.  Patient's blood pressures have improved.





6.  Sinus tachycardia again likely related to patient's respiratory status now 

improved





8.  History of TIA





9.  History of hyperlipidemia





10.  Nicotine dependence: Discussed smoking cessation for greater than 3 

minutes.  Patient refusing nicotine patch at this time.





11.  Iron deficiency Anemia: Hemoglobin 10.9 on admission.  No evidence of 

bleeding.  Total iron 21.  Hemoglobin has improved to 11.4.  Patient started on 

ferrous sulfate 325 mg daily





12.  Acute kidney injury secondary to diuretics.  Hold afternoon Lasix.  

Discussed diuretics with cardiology





Consult PT OT





Anticipate discharge possibly tomorrow





GI prophylaxis Pepcid and DVT prophylaxis subcu heparin

## 2018-11-30 NOTE — P.PN
Subjective


Progress Note Date: 11/30/18


This is a 73-year-old gentleman with history of hypertension, release inferior 

wall MI and chronic congestive heart failure who was admitted to the hospital 

with increasing shortness of breath.  Patient stopped taking his diuretics for 

a few days.  He was found to be in congestive heart failure.  Patient responded 

to diuretics very well.  He is feeling much better today.  His creatinine has 

gone up to 1.3.  His echo  showed an ejection fraction about 45%.  The inferior 

wall hypokinesis noted.  Otherwise patient is doing well.  We'll switch to by 

mouth diuretics and continue  Rest of the medication.  Increase activity.  

Possible discharge in 24-48 hours








Objective





- Vital Signs


Vital signs: 


 Vital Signs











Temp  97.8 F   11/30/18 11:28


 


Pulse  69   11/30/18 11:28


 


Resp  18   11/30/18 11:28


 


BP  116/50   11/30/18 11:28


 


Pulse Ox  91 L  11/30/18 11:28








 Intake & Output











 11/29/18 11/30/18 11/30/18





 18:59 06:59 18:59


 


Intake Total  10 1200


 


Output Total 4900 1200 1000


 


Balance -4900 -1190 200


 


Weight  107 kg 107 kg


 


Intake:   


 


  IV  10 


 


    0.9  10 


 


  Oral   1200


 


Output:   


 


  Urine 4000 1200 1000


 


    Uretheral (Holloway)   400


 


  Urine/Stool Mix 900  


 


Other:   


 


  Voiding Method Indwelling Catheter Indwelling Catheter Urinal














- Exam


GENERAL EXAM: Patient is alert and oriented and doesn't appear to be in any 

acute distress


HEENT: Normocephalic. Normal reaction of pupils, equal size, normal range of 

extraocular motion. No erythema or exudates in the throat.


NECK: No masses, no nuchal rigidity.


CHEST: No chest wall deformity.


LUNGS: Equal air entry and scattered rhonchi


HEART: S1 and S2 normal with no audible mumurs or gallops. Regular rhythm, 

femorals equal on both sides..


ABDOMEN: No hepatosplenomegaly, normal bowel sounds, no guarding or rigidity.


SKIN: No rashes


CENTRAL NERVOUS SYSTEM: No focal deficits.


EXTREMITIES: No cyanosis, clubbing or edema.








- Labs


CBC & Chem 7: 


 11/30/18 06:26





 11/30/18 06:26


Labs: 


 Abnormal Lab Results - Last 24 Hours (Table)











  11/29/18 11/29/18 11/30/18 Range/Units





  02:33 20:35 05:43 


 


Hgb     (13.0-17.5)  gm/dL


 


Hct     (39.0-53.0)  %


 


MCH     (25.0-35.0)  pg


 


BUN     (9-20)  mg/dL


 


Creatinine     (0.66-1.25)  mg/dL


 


Glucose     (74-99)  mg/dL


 


POC Glucose (mg/dL)   317 H  140 H  (75-99)  mg/dL


 


Iron  21 L    ()  ug/dL


 


Iron Saturation  6.05 L    (15.00-50.00)   














  11/30/18 11/30/18 11/30/18 Range/Units





  06:26 06:26 11:20 


 


Hgb  11.4 L    (13.0-17.5)  gm/dL


 


Hct  36.5 L    (39.0-53.0)  %


 


MCH  24.9 L    (25.0-35.0)  pg


 


BUN   37 H   (9-20)  mg/dL


 


Creatinine   1.32 H   (0.66-1.25)  mg/dL


 


Glucose   120 H   (74-99)  mg/dL


 


POC Glucose (mg/dL)    74 L  (75-99)  mg/dL


 


Iron     ()  ug/dL


 


Iron Saturation     (15.00-50.00)   








 Microbiology - Last 24 Hours (Table)











 11/29/18 04:30 Urine Culture - Preliminary





 Urine,Voided    Gram Neg Bacilli


 


 11/29/18 02:33 Blood Culture - Preliminary





 Blood    No Growth after 24 hours














Assessment and Plan


(1) Pulmonary edema


Current Visit: Yes   Status: Acute   Code(s): J81.1 - CHRONIC PULMONARY EDEMA   

SNOMED Code(s): 68401490


   





(2) Left bundle branch block


Current Visit: Yes   Status: Acute   Code(s): I44.7 - LEFT BUNDLE-BRANCH BLOCK, 

UNSPECIFIED   SNOMED Code(s): 81097592


   





(3) Coronary artery disease


Current Visit: Yes   Status: Acute   Code(s): I25.10 - ATHSCL HEART DISEASE OF 

NATIVE CORONARY ARTERY W/O ANG PCTRS   SNOMED Code(s): 69273005


   





(4) Ischemic cardiomyopathy


Current Visit: Yes   Status: Acute   Code(s): I25.5 - ISCHEMIC CARDIOMYOPATHY   

SNOMED Code(s): 683364026


   





(5) Hypertension, essential


Current Visit: Yes   Status: Acute   Code(s): I10 - ESSENTIAL (PRIMARY) 

HYPERTENSION   SNOMED Code(s): 78590468


   





(6) Hypercholesterolemia


Current Visit: Yes   Status: Acute   Code(s): E78.00 - PURE HYPERCHOLESTEROLEMIA

, UNSPECIFIED   SNOMED Code(s): 11850570


   


Plan: 


Continue current medical therapy except changing IV Lasix to by mouth Lasix.  

Increase activity as tolerated

## 2018-12-01 LAB
ALBUMIN SERPL-MCNC: 3 G/DL (ref 3.5–5)
ALP SERPL-CCNC: 92 U/L (ref 38–126)
ALT SERPL-CCNC: 32 U/L (ref 21–72)
ANION GAP SERPL CALC-SCNC: 7 MMOL/L
AST SERPL-CCNC: 17 U/L (ref 17–59)
BASOPHILS # BLD AUTO: 0 K/UL (ref 0–0.2)
BASOPHILS NFR BLD AUTO: 1 %
BUN SERPL-SCNC: 52 MG/DL (ref 9–20)
CALCIUM SPEC-MCNC: 8.7 MG/DL (ref 8.4–10.2)
CHLORIDE SERPL-SCNC: 105 MMOL/L (ref 98–107)
CO2 SERPL-SCNC: 30 MMOL/L (ref 22–30)
EOSINOPHIL # BLD AUTO: 0.1 K/UL (ref 0–0.7)
EOSINOPHIL NFR BLD AUTO: 2 %
ERYTHROCYTE [DISTWIDTH] IN BLOOD BY AUTOMATED COUNT: 4.18 M/UL (ref 4.3–5.9)
ERYTHROCYTE [DISTWIDTH] IN BLOOD: 15.5 % (ref 11.5–15.5)
GLUCOSE BLD-MCNC: 116 MG/DL (ref 75–99)
GLUCOSE BLD-MCNC: 126 MG/DL (ref 75–99)
GLUCOSE BLD-MCNC: 134 MG/DL (ref 75–99)
GLUCOSE BLD-MCNC: 93 MG/DL (ref 75–99)
GLUCOSE SERPL-MCNC: 120 MG/DL (ref 74–99)
HCT VFR BLD AUTO: 33.6 % (ref 39–53)
HGB BLD-MCNC: 10.8 GM/DL (ref 13–17.5)
LYMPHOCYTES # SPEC AUTO: 0.9 K/UL (ref 1–4.8)
LYMPHOCYTES NFR SPEC AUTO: 17 %
MCH RBC QN AUTO: 25.8 PG (ref 25–35)
MCHC RBC AUTO-ENTMCNC: 32 G/DL (ref 31–37)
MCV RBC AUTO: 80.4 FL (ref 80–100)
MONOCYTES # BLD AUTO: 0.4 K/UL (ref 0–1)
MONOCYTES NFR BLD AUTO: 8 %
NEUTROPHILS # BLD AUTO: 3.6 K/UL (ref 1.3–7.7)
NEUTROPHILS NFR BLD AUTO: 71 %
PLATELET # BLD AUTO: 199 K/UL (ref 150–450)
POTASSIUM SERPL-SCNC: 4.1 MMOL/L (ref 3.5–5.1)
PROT SERPL-MCNC: 6.3 G/DL (ref 6.3–8.2)
SODIUM SERPL-SCNC: 142 MMOL/L (ref 137–145)
WBC # BLD AUTO: 5.1 K/UL (ref 3.8–10.6)

## 2018-12-01 RX ADMIN — INSULIN DETEMIR SCH UNIT: 100 INJECTION, SOLUTION SUBCUTANEOUS at 09:17

## 2018-12-01 RX ADMIN — CARVEDILOL SCH MG: 6.25 TABLET, FILM COATED ORAL at 18:18

## 2018-12-01 RX ADMIN — HEPARIN SODIUM SCH UNIT: 5000 INJECTION, SOLUTION INTRAVENOUS; SUBCUTANEOUS at 20:41

## 2018-12-01 RX ADMIN — INSULIN ASPART SCH: 100 INJECTION, SOLUTION INTRAVENOUS; SUBCUTANEOUS at 06:44

## 2018-12-01 RX ADMIN — INSULIN ASPART SCH: 100 INJECTION, SOLUTION INTRAVENOUS; SUBCUTANEOUS at 20:38

## 2018-12-01 RX ADMIN — FUROSEMIDE SCH MG: 40 TABLET ORAL at 09:17

## 2018-12-01 RX ADMIN — FAMOTIDINE SCH MG: 20 TABLET, FILM COATED ORAL at 09:17

## 2018-12-01 RX ADMIN — CARVEDILOL SCH MG: 6.25 TABLET, FILM COATED ORAL at 06:47

## 2018-12-01 RX ADMIN — TAMSULOSIN HYDROCHLORIDE SCH MG: 0.4 CAPSULE ORAL at 20:41

## 2018-12-01 RX ADMIN — ATORVASTATIN CALCIUM SCH MG: 10 TABLET, FILM COATED ORAL at 20:41

## 2018-12-01 RX ADMIN — INSULIN ASPART SCH: 100 INJECTION, SOLUTION INTRAVENOUS; SUBCUTANEOUS at 11:57

## 2018-12-01 RX ADMIN — HEPARIN SODIUM SCH UNIT: 5000 INJECTION, SOLUTION INTRAVENOUS; SUBCUTANEOUS at 09:17

## 2018-12-01 RX ADMIN — ISOSORBIDE MONONITRATE SCH MG: 30 TABLET, EXTENDED RELEASE ORAL at 09:17

## 2018-12-01 RX ADMIN — SPIRONOLACTONE SCH MG: 25 TABLET, FILM COATED ORAL at 18:18

## 2018-12-01 RX ADMIN — FUROSEMIDE SCH MG: 40 TABLET ORAL at 18:18

## 2018-12-01 RX ADMIN — LISINOPRIL SCH MG: 20 TABLET ORAL at 18:18

## 2018-12-01 RX ADMIN — Medication SCH MG: at 09:17

## 2018-12-01 RX ADMIN — INSULIN ASPART SCH: 100 INJECTION, SOLUTION INTRAVENOUS; SUBCUTANEOUS at 18:18

## 2018-12-01 RX ADMIN — ASPIRIN 325 MG ORAL TABLET SCH MG: 325 PILL ORAL at 09:17

## 2018-12-01 NOTE — P.PN
Subjective


Progress Note Date: 12/01/18





This is a 73-year-old male, patient of Dr. Grimaldo.  Patient has a known past 

medical history of congestive heart failure, myocardial infarction, TIA, 

diabetes, hypertension, hyperlipidemia, osteoarthritis and nicotine dependence.

  Patient presents to the emergency room with complaints of shortness of breath 

and evidence of congestive heart failure.  Patient reports the shortness of 

breath started around midnight last night.  He was concerned and called EMS.  

Per ER reports upon EMS evaluation patient was in acute respiratory failure.  

Oxygen saturations were in the 70s and radials noted on lung exam.  He was 

placed on CPAP given nitro and DuoNeb as well as IV Solu-Medrol in route.  He 

required to be on BiPAP in the ER.  Patient was started on IV Lasix for 

congestive heart failure and pulmonary edema.  Chest x-ray showed evidence of 

congestive heart failure.  Patient did have significant elevated blood pressure 

and was tachycardic on admission.  Blood pressure 201/114 and heart rate 121.  

Patient started on IV Lasix.  Respiratory symptoms are improving.  He is 

currently on 2 L of oxygen satting at 95%.  Cardiology has been placed on 

consult.  Patient stopped taking his oral Lasix about 2 weeks ago by his 

physician.  Patient reports eating a half of sausage yesterday, in which she 

knows is very salty.  And blames this for contributing to his fluid overload.  

Patient denies any chest pain, fever, chills, sweats, nausea or vomiting, bowel 

movement changes or urinary symptoms.  Holloway catheter inserted in ER due to him 

being on IV Lasix.  Cardiology has been placed on consult for CHF exacerbation.





11/30/18 patient's shortness of breath has improved.  He is currently off of 

oxygen.  He has been able to ambulate to the bathroom and back without 

shortness of breath.  Otherwise he has not ambulated much.  He's been on the IV 

Lasix.  Creatinine has increased to 1.32.  We'll discuss diuretics with 

cardiology service.  At this time instructed nursing staff to hold afternoon 

dose of Lasix.  Patient denies any chest pain shortness of breath nausea 

vomiting.  Reports having bowel movements.  Denies any difficulty urinating.





On 12/01/2018 patient is alert and oriented 3 he was seen and examined on the 

telemetry floor his shortness of breath has improved, kidney function has 

worsened since yesterday with a BUN of 52 and a creatinine of 1.5, patient 

clinically is doing well there is no fever or chills no headache or dizziness 

no chest pain there is some shortness of breath with activity no cough no 

nausea or vomiting no abdominal pain no diarrhea and no urinary symptoms





Objective





- Vital Signs


Vital signs: 


 Vital Signs











Temp  98.2 F   12/01/18 12:00


 


Pulse  65   12/01/18 12:00


 


Resp  20   12/01/18 12:00


 


BP  119/58   12/01/18 12:00


 


Pulse Ox  96   12/01/18 12:00








 Intake & Output











 11/30/18 12/01/18 12/01/18





 18:59 06:59 18:59


 


Intake Total 1440  598


 


Output Total 1000  


 


Balance 440  598


 


Weight 107 kg 108.7 kg 


 


Intake:   


 


  Oral 1440  598


 


Output:   


 


  Urine 1000  


 


    Uretheral (H[place]ay) 400  


 


Other:   


 


  Voiding Method Urinal Urinal 














- Exam





Head normocephalic and atraumatic


Neck supple no JVD no goiter


Lungs a few faint crackles at the bases


Heart regular rate and rhythm S1-S2, no rub or gallop


Abdomen is soft nontender nondistended positive bowel sounds no 

hepatosplenomegaly


Extremities no edema no cyanosis or clubbing


Neuro alert and orientated to 3








- Labs


CBC & Chem 7: 


 12/01/18 08:13





 12/01/18 08:13


Labs: 


 Abnormal Lab Results - Last 24 Hours (Table)











  11/30/18 11/30/18 12/01/18 Range/Units





  16:45 20:26 08:13 


 


RBC    4.18 L  (4.30-5.90)  m/uL


 


Hgb    10.8 L  (13.0-17.5)  gm/dL


 


Hct    33.6 L  (39.0-53.0)  %


 


Lymphocytes #    0.9 L  (1.0-4.8)  k/uL


 


BUN     (9-20)  mg/dL


 


Creatinine     (0.66-1.25)  mg/dL


 


Glucose     (74-99)  mg/dL


 


POC Glucose (mg/dL)  129 H  185 H   (75-99)  mg/dL


 


Albumin     (3.5-5.0)  g/dL














  12/01/18 12/01/18 Range/Units





  08:13 11:26 


 


RBC    (4.30-5.90)  m/uL


 


Hgb    (13.0-17.5)  gm/dL


 


Hct    (39.0-53.0)  %


 


Lymphocytes #    (1.0-4.8)  k/uL


 


BUN  52 H   (9-20)  mg/dL


 


Creatinine  1.50 H   (0.66-1.25)  mg/dL


 


Glucose  120 H   (74-99)  mg/dL


 


POC Glucose (mg/dL)   134 H  (75-99)  mg/dL


 


Albumin  3.0 L   (3.5-5.0)  g/dL








 Microbiology - Last 24 Hours (Table)











 11/29/18 04:30 Urine Culture - Final





 Urine,Voided    Morganella morganii


 


 11/29/18 02:33 Blood Culture - Preliminary





 Blood    No Growth after 48 hours














Assessment and Plan


Plan: 





1.  Acute hypoxic respiratory failure secondary to congestive heart failure 

exacerbation and pulmonary edema





2.  Acute on chronic systolic CHF exacerbation: Patient started on IV Lasix 40 

mg IV every 8 hours.  Cardiology on consult.  Echo shows an EF of 40-45% with 

wall motion normality.  Also mild aortic stenosis, mild mitral regurgitation 

and tricuspid regurgitation





3.  History of diabetes mellitus type 2: A1c 6.7.  Resume patient's Lantus and 

glipizide.  Hold metformin during hospitalization.  Add Humalog sliding scale 

coverage.





4.  Essential hypertension





5.  Hypertensive urgency on admission likely due to patient's respiratory 

status.  Patient's blood pressures have improved.





6.  Sinus tachycardia again likely related to patient's respiratory status now 

improved





8.  History of TIA





9.  History of hyperlipidemia





10.  Nicotine dependence: Discussed smoking cessation for greater than 3 

minutes.  Patient refusing nicotine patch at this time.





11.  Iron deficiency Anemia: Hemoglobin 10.9 on admission.  No evidence of 

bleeding.  Total iron 21.  Hemoglobin has improved to 11.4.  Patient started on 

ferrous sulfate 325 mg daily





12.  Acute kidney injury secondary to diuretics.  Hold afternoon Lasix.  

Discussed diuretics with cardiology





Consult PT OT





Anticipate discharge possibly tomorrow





GI prophylaxis Pepcid and DVT prophylaxis subcu heparin

## 2018-12-01 NOTE — P.PN
Subjective





This is a pleasant 73-year-old male past medical history significant for 

myocardial infarction of the inferior wall, hypertension and chronic congestive 

heart failure.  He was admitted to the hospital for symptoms of increasing 

shortness of breath.  Per the patient he stopped taking his diuretics for a few 

days prior to admission.  He has been transitioned to oral diuretics in the 

form of Lasix 40 mg twice a day.  He is seen and examined laying flat resting 

comfortably in bed in no acute distress.  He denies any symptoms of chest pain, 

shortness of breath, dizziness or palpitations.  He is currently maintained on 

aspirin 81 mg daily, atorvastatin 10 mg daily, carvedilol 6.25 mg twice a day, 

Lasix 40 mg twice a day, Imdur 30 mg daily, lisinopril 40 mg daily and 

Aldactone 25 mg daily.  Blood pressure 119/58 heart 65 maintaining oxygen 

saturation on room air.  Laboratory data reviewed, WBC 5.1, hemoglobin 10.8, 

platelets 189, sodium 142, potassium 4.1, creatinine 1.5. 





GENERAL: Well-appearing, well-nourished and in no acute distress.


NECK: Supple without JVD or thyromegaly.


LUNGS: Breath sounds clear to auscultation bilaterally. Respiration equal and 

unlabored.  No wheezes, rales or rhonchi.


HEART: Regular rate and rhythm with systolic ejection murmur at the base, no 

rubs or gallops. S1 and S2 heard.


EXTREMITIES: Normal range of motion, no edema.  No clubbing or cyanosis. 

Peripheral pulses intact.





ASSESSMENT


Acute pulmonary edema, improved


History of coronary artery disease


Chronic ischemic cardiomyopathy


Hypertension


Dyslipidemia





PLAN


Stable from a cardiac perspective on current regimen.


Patient is hemodynamically stable and asymptomatic.  He medial discharged home 

follow-up with Dr. Fuchs in the office in 2-3 weeks.





Nurse Practitioner note has been reviewed, I agree with a documented findings 

and plan of care.  Patient was seen and examined.








Objective





- Vital Signs


Vital signs: 


 Vital Signs











Temp  98.2 F   12/01/18 12:00


 


Pulse  65   12/01/18 12:00


 


Resp  20   12/01/18 12:00


 


BP  119/58   12/01/18 12:00


 


Pulse Ox  96   12/01/18 12:00








 Intake & Output











 11/30/18 12/01/18 12/01/18





 18:59 06:59 18:59


 


Intake Total 1440  598


 


Output Total 1000  


 


Balance 440  598


 


Weight 107 kg 108.7 kg 


 


Intake:   


 


  Oral 1440  598


 


Output:   


 


  Urine 1000  


 


    Uretheral ([place]ay) 400  


 


Other:   


 


  Voiding Method Urinal Urinal 














- Labs


CBC & Chem 7: 


 12/01/18 08:13





 12/01/18 08:13


Labs: 


 Abnormal Lab Results - Last 24 Hours (Table)











  11/30/18 11/30/18 12/01/18 Range/Units





  16:45 20:26 08:13 


 


RBC    4.18 L  (4.30-5.90)  m/uL


 


Hgb    10.8 L  (13.0-17.5)  gm/dL


 


Hct    33.6 L  (39.0-53.0)  %


 


Lymphocytes #    0.9 L  (1.0-4.8)  k/uL


 


BUN     (9-20)  mg/dL


 


Creatinine     (0.66-1.25)  mg/dL


 


Glucose     (74-99)  mg/dL


 


POC Glucose (mg/dL)  129 H  185 H   (75-99)  mg/dL


 


Albumin     (3.5-5.0)  g/dL














  12/01/18 12/01/18 Range/Units





  08:13 11:26 


 


RBC    (4.30-5.90)  m/uL


 


Hgb    (13.0-17.5)  gm/dL


 


Hct    (39.0-53.0)  %


 


Lymphocytes #    (1.0-4.8)  k/uL


 


BUN  52 H   (9-20)  mg/dL


 


Creatinine  1.50 H   (0.66-1.25)  mg/dL


 


Glucose  120 H   (74-99)  mg/dL


 


POC Glucose (mg/dL)   134 H  (75-99)  mg/dL


 


Albumin  3.0 L   (3.5-5.0)  g/dL








 Microbiology - Last 24 Hours (Table)











 11/29/18 04:30 Urine Culture - Final





 Urine,Voided    Morganella morganii


 


 11/29/18 02:33 Blood Culture - Preliminary





 Blood    No Growth after 48 hours

## 2018-12-02 LAB
ALBUMIN SERPL-MCNC: 2.9 G/DL (ref 3.5–5)
ALP SERPL-CCNC: 91 U/L (ref 38–126)
ALT SERPL-CCNC: 19 U/L (ref 21–72)
ANION GAP SERPL CALC-SCNC: 8 MMOL/L
AST SERPL-CCNC: 12 U/L (ref 17–59)
BASOPHILS # BLD AUTO: 0 K/UL (ref 0–0.2)
BASOPHILS NFR BLD AUTO: 1 %
BUN SERPL-SCNC: 49 MG/DL (ref 9–20)
CALCIUM SPEC-MCNC: 8.5 MG/DL (ref 8.4–10.2)
CHLORIDE SERPL-SCNC: 102 MMOL/L (ref 98–107)
CO2 SERPL-SCNC: 29 MMOL/L (ref 22–30)
EOSINOPHIL # BLD AUTO: 0.2 K/UL (ref 0–0.7)
EOSINOPHIL NFR BLD AUTO: 4 %
ERYTHROCYTE [DISTWIDTH] IN BLOOD BY AUTOMATED COUNT: 4.22 M/UL (ref 4.3–5.9)
ERYTHROCYTE [DISTWIDTH] IN BLOOD: 15.4 % (ref 11.5–15.5)
GLUCOSE BLD-MCNC: 109 MG/DL (ref 75–99)
GLUCOSE BLD-MCNC: 113 MG/DL (ref 75–99)
GLUCOSE BLD-MCNC: 236 MG/DL (ref 75–99)
GLUCOSE BLD-MCNC: 90 MG/DL (ref 75–99)
GLUCOSE SERPL-MCNC: 106 MG/DL (ref 74–99)
HCT VFR BLD AUTO: 33.4 % (ref 39–53)
HGB BLD-MCNC: 10.5 GM/DL (ref 13–17.5)
LYMPHOCYTES # SPEC AUTO: 1 K/UL (ref 1–4.8)
LYMPHOCYTES NFR SPEC AUTO: 19 %
MCH RBC QN AUTO: 24.8 PG (ref 25–35)
MCHC RBC AUTO-ENTMCNC: 31.3 G/DL (ref 31–37)
MCV RBC AUTO: 79.1 FL (ref 80–100)
MONOCYTES # BLD AUTO: 0.6 K/UL (ref 0–1)
MONOCYTES NFR BLD AUTO: 11 %
NEUTROPHILS # BLD AUTO: 3.5 K/UL (ref 1.3–7.7)
NEUTROPHILS NFR BLD AUTO: 64 %
PLATELET # BLD AUTO: 200 K/UL (ref 150–450)
POTASSIUM SERPL-SCNC: 4.3 MMOL/L (ref 3.5–5.1)
PROT SERPL-MCNC: 6 G/DL (ref 6.3–8.2)
SODIUM SERPL-SCNC: 139 MMOL/L (ref 137–145)
WBC # BLD AUTO: 5.4 K/UL (ref 3.8–10.6)

## 2018-12-02 RX ADMIN — ISOSORBIDE MONONITRATE SCH MG: 30 TABLET, EXTENDED RELEASE ORAL at 09:23

## 2018-12-02 RX ADMIN — Medication SCH MG: at 09:23

## 2018-12-02 RX ADMIN — LISINOPRIL SCH MG: 20 TABLET ORAL at 17:10

## 2018-12-02 RX ADMIN — TAMSULOSIN HYDROCHLORIDE SCH MG: 0.4 CAPSULE ORAL at 20:45

## 2018-12-02 RX ADMIN — SPIRONOLACTONE SCH MG: 25 TABLET, FILM COATED ORAL at 17:10

## 2018-12-02 RX ADMIN — HEPARIN SODIUM SCH: 5000 INJECTION, SOLUTION INTRAVENOUS; SUBCUTANEOUS at 20:34

## 2018-12-02 RX ADMIN — HEPARIN SODIUM SCH UNIT: 5000 INJECTION, SOLUTION INTRAVENOUS; SUBCUTANEOUS at 09:23

## 2018-12-02 RX ADMIN — FUROSEMIDE SCH MG: 40 TABLET ORAL at 17:10

## 2018-12-02 RX ADMIN — FAMOTIDINE SCH MG: 20 TABLET, FILM COATED ORAL at 09:23

## 2018-12-02 RX ADMIN — CARVEDILOL SCH MG: 6.25 TABLET, FILM COATED ORAL at 17:09

## 2018-12-02 RX ADMIN — INSULIN DETEMIR SCH UNIT: 100 INJECTION, SOLUTION SUBCUTANEOUS at 09:23

## 2018-12-02 RX ADMIN — ATORVASTATIN CALCIUM SCH MG: 10 TABLET, FILM COATED ORAL at 20:45

## 2018-12-02 RX ADMIN — INSULIN ASPART SCH UNIT: 100 INJECTION, SOLUTION INTRAVENOUS; SUBCUTANEOUS at 20:45

## 2018-12-02 RX ADMIN — CARVEDILOL SCH MG: 6.25 TABLET, FILM COATED ORAL at 06:36

## 2018-12-02 RX ADMIN — INSULIN ASPART SCH: 100 INJECTION, SOLUTION INTRAVENOUS; SUBCUTANEOUS at 17:04

## 2018-12-02 RX ADMIN — ASPIRIN 81 MG CHEWABLE TABLET SCH MG: 81 TABLET CHEWABLE at 09:23

## 2018-12-02 RX ADMIN — FUROSEMIDE SCH MG: 40 TABLET ORAL at 09:23

## 2018-12-02 RX ADMIN — INSULIN ASPART SCH: 100 INJECTION, SOLUTION INTRAVENOUS; SUBCUTANEOUS at 06:07

## 2018-12-02 RX ADMIN — INSULIN ASPART SCH: 100 INJECTION, SOLUTION INTRAVENOUS; SUBCUTANEOUS at 11:58

## 2018-12-02 NOTE — P.PN
Subjective


Progress Note Date: 12/02/18





This is a 73-year-old male, patient of Dr. Grimaldo.  Patient has a known past 

medical history of congestive heart failure, myocardial infarction, TIA, 

diabetes, hypertension, hyperlipidemia, osteoarthritis and nicotine dependence.

  Patient presents to the emergency room with complaints of shortness of breath 

and evidence of congestive heart failure.  Patient reports the shortness of 

breath started around midnight last night.  He was concerned and called EMS.  

Per ER reports upon EMS evaluation patient was in acute respiratory failure.  

Oxygen saturations were in the 70s and radials noted on lung exam.  He was 

placed on CPAP given nitro and DuoNeb as well as IV Solu-Medrol in route.  He 

required to be on BiPAP in the ER.  Patient was started on IV Lasix for 

congestive heart failure and pulmonary edema.  Chest x-ray showed evidence of 

congestive heart failure.  Patient did have significant elevated blood pressure 

and was tachycardic on admission.  Blood pressure 201/114 and heart rate 121.  

Patient started on IV Lasix.  Respiratory symptoms are improving.  He is 

currently on 2 L of oxygen satting at 95%.  Cardiology has been placed on 

consult.  Patient stopped taking his oral Lasix about 2 weeks ago by his 

physician.  Patient reports eating a half of sausage yesterday, in which she 

knows is very salty.  And blames this for contributing to his fluid overload.  

Patient denies any chest pain, fever, chills, sweats, nausea or vomiting, bowel 

movement changes or urinary symptoms.  Holloway catheter inserted in ER due to him 

being on IV Lasix.  Cardiology has been placed on consult for CHF exacerbation.





11/30/18 patient's shortness of breath has improved.  He is currently off of 

oxygen.  He has been able to ambulate to the bathroom and back without 

shortness of breath.  Otherwise he has not ambulated much.  He's been on the IV 

Lasix.  Creatinine has increased to 1.32.  We'll discuss diuretics with 

cardiology service.  At this time instructed nursing staff to hold afternoon 

dose of Lasix.  Patient denies any chest pain shortness of breath nausea 

vomiting.  Reports having bowel movements.  Denies any difficulty urinating.





On 12/01/2018 patient is alert and oriented 3 he was seen and examined on the 

telemetry floor his shortness of breath has improved, kidney function has 

worsened since yesterday with a BUN of 52 and a creatinine of 1.5, patient 

clinically is doing well there is no fever or chills no headache or dizziness 

no chest pain there is some shortness of breath with activity no cough no 

nausea or vomiting no abdominal pain no diarrhea and no urinary symptoms





On 12/02/2018 patient is alert and oriented 3 he has shortness of breath with 

activity otherwise no complaints at this time creatinine is still elevated at 

1.54, cardiology will reevaluate the patient to assess his medications 

including diuretics





Objective





- Vital Signs


Vital signs: 


 Vital Signs











Temp  98.1 F   12/02/18 12:00


 


Pulse  68   12/02/18 12:00


 


Resp  20   12/02/18 12:00


 


BP  105/52   12/02/18 12:00


 


Pulse Ox  93 L  12/02/18 12:00








 Intake & Output











 12/01/18 12/02/18 12/02/18





 18:59 06:59 18:59


 


Intake Total 778  


 


Balance 778  


 


Weight  108.6 kg 


 


Intake:   


 


  Oral 778  


 


Other:   


 


  Voiding Method  Urinal 


 


  # Voids  1 2


 


  # Bowel Movements  1 














- Exam





Head normocephalic and atraumatic


Neck supple no JVD no goiter


Lungs a few faint crackles at the bases


Heart regular rate and rhythm S1-S2, no rub or gallop


Abdomen is soft nontender nondistended positive bowel sounds no 

hepatosplenomegaly


Extremities no edema no cyanosis or clubbing


Neuro alert and orientated to 3








- Labs


CBC & Chem 7: 


 12/02/18 05:43





 12/02/18 05:43


Labs: 


 Abnormal Lab Results - Last 24 Hours (Table)











  12/01/18 12/01/18 12/02/18 Range/Units





  16:35 20:30 05:43 


 


RBC    4.22 L  (4.30-5.90)  m/uL


 


Hgb    10.5 L  (13.0-17.5)  gm/dL


 


Hct    33.4 L  (39.0-53.0)  %


 


MCV    79.1 L  (80.0-100.0)  fL


 


MCH    24.8 L  (25.0-35.0)  pg


 


BUN     (9-20)  mg/dL


 


Creatinine     (0.66-1.25)  mg/dL


 


Glucose     (74-99)  mg/dL


 


POC Glucose (mg/dL)  116 H  126 H   (75-99)  mg/dL


 


AST     (17-59)  U/L


 


ALT     (21-72)  U/L


 


Total Protein     (6.3-8.2)  g/dL


 


Albumin     (3.5-5.0)  g/dL














  12/02/18 12/02/18 12/02/18 Range/Units





  05:43 05:51 11:57 


 


RBC     (4.30-5.90)  m/uL


 


Hgb     (13.0-17.5)  gm/dL


 


Hct     (39.0-53.0)  %


 


MCV     (80.0-100.0)  fL


 


MCH     (25.0-35.0)  pg


 


BUN  49 H    (9-20)  mg/dL


 


Creatinine  1.54 H    (0.66-1.25)  mg/dL


 


Glucose  106 H    (74-99)  mg/dL


 


POC Glucose (mg/dL)   109 H  113 H  (75-99)  mg/dL


 


AST  12 L    (17-59)  U/L


 


ALT  19 L    (21-72)  U/L


 


Total Protein  6.0 L    (6.3-8.2)  g/dL


 


Albumin  2.9 L    (3.5-5.0)  g/dL








 Microbiology - Last 24 Hours (Table)











 11/29/18 02:33 Blood Culture - Preliminary





 Blood    No Growth after 72 hours














Assessment and Plan


Plan: 





1.  Acute hypoxic respiratory failure secondary to congestive heart failure 

exacerbation and pulmonary edema





2.  Acute on chronic systolic CHF exacerbation: Patient started on IV Lasix 40 

mg IV every 8 hours.  Cardiology on consult.  Echo shows an EF of 40-45% with 

wall motion normality.  Also mild aortic stenosis, mild mitral regurgitation 

and tricuspid regurgitation





3.  History of diabetes mellitus type 2: A1c 6.7.  Resume patient's Lantus and 

glipizide.  Hold metformin during hospitalization.  Add Humalog sliding scale 

coverage.





4.  Essential hypertension





5.  Hypertensive urgency on admission likely due to patient's respiratory 

status.  Patient's blood pressures have improved.





6.  Sinus tachycardia again likely related to patient's respiratory status now 

improved





8.  History of TIA





9.  History of hyperlipidemia





10.  Nicotine dependence: Discussed smoking cessation for greater than 3 

minutes.  Patient refusing nicotine patch at this time.





11.  Iron deficiency Anemia: Hemoglobin 10.9 on admission.  No evidence of 

bleeding.  Total iron 21.  Hemoglobin has improved to 11.4.  Patient started on 

ferrous sulfate 325 mg daily





12.  Acute kidney injury secondary to diuretics.  Hold afternoon Lasix.  

Discussed diuretics with cardiology





Consult PT OT





Anticipate discharge possibly tomorrow





GI prophylaxis Pepcid and DVT prophylaxis subcu heparin

## 2018-12-03 VITALS
HEART RATE: 65 BPM | SYSTOLIC BLOOD PRESSURE: 129 MMHG | RESPIRATION RATE: 16 BRPM | DIASTOLIC BLOOD PRESSURE: 63 MMHG | TEMPERATURE: 98 F

## 2018-12-03 LAB
ALBUMIN SERPL-MCNC: 3 G/DL (ref 3.5–5)
ALP SERPL-CCNC: 112 U/L (ref 38–126)
ALT SERPL-CCNC: 22 U/L (ref 21–72)
ANION GAP SERPL CALC-SCNC: 10 MMOL/L
AST SERPL-CCNC: 11 U/L (ref 17–59)
BASOPHILS # BLD AUTO: 0 K/UL (ref 0–0.2)
BASOPHILS NFR BLD AUTO: 1 %
BUN SERPL-SCNC: 43 MG/DL (ref 9–20)
CALCIUM SPEC-MCNC: 8.6 MG/DL (ref 8.4–10.2)
CHLORIDE SERPL-SCNC: 102 MMOL/L (ref 98–107)
CO2 SERPL-SCNC: 28 MMOL/L (ref 22–30)
EOSINOPHIL # BLD AUTO: 0.2 K/UL (ref 0–0.7)
EOSINOPHIL NFR BLD AUTO: 4 %
ERYTHROCYTE [DISTWIDTH] IN BLOOD BY AUTOMATED COUNT: 4.34 M/UL (ref 4.3–5.9)
ERYTHROCYTE [DISTWIDTH] IN BLOOD: 15.5 % (ref 11.5–15.5)
GLUCOSE BLD-MCNC: 167 MG/DL (ref 75–99)
GLUCOSE BLD-MCNC: 172 MG/DL (ref 75–99)
GLUCOSE SERPL-MCNC: 164 MG/DL (ref 74–99)
HCT VFR BLD AUTO: 34.4 % (ref 39–53)
HGB BLD-MCNC: 10.8 GM/DL (ref 13–17.5)
LYMPHOCYTES # SPEC AUTO: 0.9 K/UL (ref 1–4.8)
LYMPHOCYTES NFR SPEC AUTO: 17 %
MCH RBC QN AUTO: 24.8 PG (ref 25–35)
MCHC RBC AUTO-ENTMCNC: 31.4 G/DL (ref 31–37)
MCV RBC AUTO: 79.2 FL (ref 80–100)
MONOCYTES # BLD AUTO: 0.5 K/UL (ref 0–1)
MONOCYTES NFR BLD AUTO: 9 %
NEUTROPHILS # BLD AUTO: 3.4 K/UL (ref 1.3–7.7)
NEUTROPHILS NFR BLD AUTO: 67 %
PLATELET # BLD AUTO: 200 K/UL (ref 150–450)
POTASSIUM SERPL-SCNC: 4.1 MMOL/L (ref 3.5–5.1)
PROT SERPL-MCNC: 6.3 G/DL (ref 6.3–8.2)
SODIUM SERPL-SCNC: 140 MMOL/L (ref 137–145)
WBC # BLD AUTO: 5.1 K/UL (ref 3.8–10.6)

## 2018-12-03 RX ADMIN — FAMOTIDINE SCH MG: 20 TABLET, FILM COATED ORAL at 09:25

## 2018-12-03 RX ADMIN — Medication SCH MG: at 09:25

## 2018-12-03 RX ADMIN — ISOSORBIDE MONONITRATE SCH MG: 30 TABLET, EXTENDED RELEASE ORAL at 09:25

## 2018-12-03 RX ADMIN — INSULIN ASPART SCH: 100 INJECTION, SOLUTION INTRAVENOUS; SUBCUTANEOUS at 06:23

## 2018-12-03 RX ADMIN — INSULIN DETEMIR SCH UNIT: 100 INJECTION, SOLUTION SUBCUTANEOUS at 10:08

## 2018-12-03 RX ADMIN — INSULIN ASPART SCH UNIT: 100 INJECTION, SOLUTION INTRAVENOUS; SUBCUTANEOUS at 12:57

## 2018-12-03 RX ADMIN — CARVEDILOL SCH MG: 6.25 TABLET, FILM COATED ORAL at 06:26

## 2018-12-03 RX ADMIN — HEPARIN SODIUM SCH UNIT: 5000 INJECTION, SOLUTION INTRAVENOUS; SUBCUTANEOUS at 09:25

## 2018-12-03 RX ADMIN — ASPIRIN 81 MG CHEWABLE TABLET SCH MG: 81 TABLET CHEWABLE at 09:25

## 2018-12-03 RX ADMIN — FUROSEMIDE SCH MG: 40 TABLET ORAL at 09:25

## 2018-12-03 NOTE — P.DS
Providers


Date of admission: 


11/29/18 04:45





Expected date of discharge: 12/03/18


Attending physician: 


Marlin Tafoya





Consults: 





 





11/29/18 04:45


Consult Physician Stat 


   Consulting Provider: [facility]


   Consult Reason/Comments: CHF, flash pulmonary edema


   Do you want consulting provider notified?: Yes, Notify in am











Primary care physician: 


Merry Grimaldo





Hospital Course: 





Discharge diagnosis





1.  Acute hypoxic respiratory failure secondary to congestive heart failure 

exacerbation and pulmonary edema





2.  Acute on chronic systolic CHF exacerbation: Patient switched from IV Lasix 

to oral Lasix 60 mg by mouth daily.  Echo shows an EF of 40-45% with wall 

motion normality.  Also mild aortic stenosis, mild mitral regurgitation and 

tricuspid regurgitation





3.  History of diabetes mellitus type 2: A1c 6.7.  Resume patient's Lantus and 

glipizide.  Continue to hold metformin due to the elevated creatinine of 1.59





4.  Essential hypertension





5.  Hypertensive urgency on admission likely due to patient's respiratory 

status.  Patient's blood pressures have improved.





6.  Sinus tachycardia again likely related to patient's respiratory status now 

improved





8.  History of TIA





9.  History of hyperlipidemia





10.  Nicotine dependence: Discussed smoking cessation for greater than 3 

minutes.  Patient refusing nicotine patch at this time.





11.  Iron deficiency Anemia: Hemoglobin 10.9 on admission.  No evidence of 

bleeding.  Total iron 21.   Patient started on ferrous sulfate 325 mg daily 

during hospitalization





12.  Acute kidney injury secondary to diuretics.  Evidence of cardiorenal 

syndrome.  Creatinine at discharge 1.59.  Lasix again decreased to 60 mg by 

mouth daily from 40 mg twice a day.





Hospital course





This is a 73-year-old male, patient of Dr. Grimaldo.  Patient has a known past 

medical history of congestive heart failure, myocardial infarction, TIA, 

diabetes, hypertension, hyperlipidemia, osteoarthritis and nicotine dependence.

  Patient presents to the emergency room with complaints of shortness of breath 

and evidence of congestive heart failure.  Patient reports the shortness of 

breath started around midnight last night.  He was concerned and called EMS.  

Per ER reports upon EMS evaluation patient was in acute respiratory failure.  

Oxygen saturations were in the 70s and radials noted on lung exam.  He was 

placed on CPAP given nitro and DuoNeb as well as IV Solu-Medrol in route.  He 

required to be on BiPAP in the ER.  Patient was started on IV Lasix for 

congestive heart failure and pulmonary edema.  Chest x-ray showed evidence of 

congestive heart failure.  Patient did have significant elevated blood pressure 

and was tachycardic on admission.  Blood pressure 201/114 and heart rate 121.  

Patient started on IV Lasix.  Respiratory symptoms are improving.  He is 

currently on 2 L of oxygen satting at 95%.  Cardiology has been placed on 

consult.  Patient stopped taking his oral Lasix about 2 weeks ago by his 

physician.  Patient reports eating a half of sausage yesterday, in which she 

knows is very salty.  And blames this for contributing to his fluid overload.  

Patient denies any chest pain, fever, chills, sweats, nausea or vomiting, bowel 

movement changes or urinary symptoms.  Holloway catheter inserted in ER due to him 

being on IV Lasix.  Cardiology has been placed on consult for CHF exacerbation.





11/30/18 patient's shortness of breath has improved.  He is currently off of 

oxygen.  He has been able to ambulate to the bathroom and back without 

shortness of breath.  Otherwise he has not ambulated much.  He's been on the IV 

Lasix.  Creatinine has increased to 1.32.  We'll discuss diuretics with 

cardiology service.  At this time instructed nursing staff to hold afternoon 

dose of Lasix.  Patient denies any chest pain shortness of breath nausea 

vomiting.  Reports having bowel movements.  Denies any difficulty urinating.





On 12/01/2018 patient is alert and oriented 3 he was seen and examined on the 

telemetry floor his shortness of breath has improved, kidney function has 

worsened since yesterday with a BUN of 52 and a creatinine of 1.5, patient 

clinically is doing well there is no fever or chills no headache or dizziness 

no chest pain there is some shortness of breath with activity no cough no 

nausea or vomiting no abdominal pain no diarrhea and no urinary symptoms





On 12/02/2018 patient is alert and oriented 3 he has shortness of breath with 

activity otherwise no complaints at this time creatinine is still elevated at 

1.54, cardiology will reevaluate the patient to assess his medications 

including diuretics





12/03/2018 patient is medically stable for discharge.  He has been cleared by 

cardiology for discharge.  He was found have evidence of an acute CHF 

exacerbation likely due to him eating salty foods.  He will be on Lasix 60 mg 

daily.  Dose needed to be decreased from 40 twice a day due to the rise in the 

creatinine.  Creatinine at discharge is 1.59.  Recommend following up on a BMP 

in 3 days.  Patient seen evaluated by cardiology during this admission and has 

been cleared for discharge.  As stated above echo showed an EF of 40-45%.  

Cardiology did recommend the patient continues with the Aldactone and ACE 

inhibitor.  At this time metformin was discontinued due to the elevated 

creatinine and recommend hold on that medication until kidney functions have 

improved.  Patient is medical stable for discharge.  Please refer to chart for 

any further details.  Recommend follow-up with his PCP in 1 week.  Recommend 

checking CBC and BMP in 3 days.  Follow-up with cardiology in 2 weeks.  

Continue to monitor functions closely in the outpatient setting.  If creatinine 

continues to increase further adjustments in patient's medications may be 

warranted.  May need to cut back on diuretics Lasix or Aldactone.  Also may 

need to cut back on the ACE inhibitor.





I performed an examination of the patient and discussed their management with 

the physician Assistant.  I have reviewed the Physician Assistant's notes and 

agree with the documented findings and plan of care


Patient Condition at Discharge: Stable





Plan - Discharge Summary


Discharge Rx Participant: No


New Discharge Prescriptions: 


New


   Cefuroxime Axetil [Ceftin] 500 mg PO BID 3 Days #6 tab


   Ferrous Sulfate [Iron (65 MG Elemental)] 325 mg PO DAILY #30 tab


   Furosemide [Lasix] 60 mg PO DAILY #30 tab





Continue


   glipiZIDE [Glucotrol] 20 mg PO W/BRKFST


   Benazepril HCl 40 mg PO DAILY


   Aspirin 81 mg PO DAILY


   Tamsulosin [Flomax] 0.4 mg PO HS


   Insulin Glargine [Lantus] 40 unit SQ DAILY


   Carvedilol [Coreg] 6.25 mg PO BID


   Lovastatin [Mevacor] 20 mg PO HS


   Isosorbide Mononitrate ER [Imdur] 30 mg PO DAILY


   Spironolactone [Aldactone] 25 mg PO DAILY





Discontinued


   metFORMIN HCL ER [Glucophage Xr] 1,000 mg PO BID


   Furosemide [Lasix] 40 mg PO BID


Discharge Medication List





Benazepril HCl 40 mg PO DAILY 12/03/17 [History]


glipiZIDE [Glucotrol] 20 mg PO W/BRKFST 12/03/17 [History]


Aspirin 81 mg PO DAILY 12/04/17 [History]


Insulin Glargine [Lantus] 40 unit SQ DAILY 12/04/17 [History]


Tamsulosin [Flomax] 0.4 mg PO HS 12/04/17 [History]


Carvedilol [Coreg] 6.25 mg PO BID 09/05/18 [History]


Isosorbide Mononitrate ER [Imdur] 30 mg PO DAILY 09/05/18 [History]


Lovastatin [Mevacor] 20 mg PO HS 09/05/18 [History]


Spironolactone [Aldactone] 25 mg PO DAILY 09/05/18 [History]


Cefuroxime Axetil [Ceftin] 500 mg PO BID 3 Days #6 tab 12/03/18 [Rx]


Ferrous Sulfate [Iron (65 MG Elemental)] 325 mg PO DAILY #30 tab 12/03/18 [Rx]


Furosemide [Lasix] 60 mg PO DAILY #30 tab 12/03/18 [Rx]








Follow up Appointment(s)/Referral(s): 


Merry Grimaldo MD [Primary Care Provider] - 12/10/18 1:15 pm (Monday)


Torey Fuchs MD [STAFF PHYSICIAN] - 12/19/18 4:00 pm (Wednesday)


Ambulatory/Diagnostic Orders: 


Basic Metabolic Panel [LAB.AMB] Time Frame: 3 Days, Location: None Selected


Patient Instructions/Handouts:  Heart Failure (DC)


Activity/Diet/Wound Care/Special Instructions: 


Diet: cardiac, low salt, diabetic


Activity: as tolerated


Discharge Disposition: HOME SELF-CARE

## 2019-06-06 ENCOUNTER — HOSPITAL ENCOUNTER (OUTPATIENT)
Dept: HOSPITAL 47 - LABPAT | Age: 74
End: 2019-06-06
Attending: SURGERY
Payer: MEDICARE

## 2019-06-06 DIAGNOSIS — K43.6: ICD-10-CM

## 2019-06-06 DIAGNOSIS — F17.200: ICD-10-CM

## 2019-06-06 DIAGNOSIS — Z01.818: Primary | ICD-10-CM

## 2019-06-06 DIAGNOSIS — D64.9: ICD-10-CM

## 2019-06-06 DIAGNOSIS — Z01.812: ICD-10-CM

## 2019-06-06 LAB
BASOPHILS # BLD AUTO: 0 K/UL (ref 0–0.2)
BASOPHILS NFR BLD AUTO: 1 %
EOSINOPHIL # BLD AUTO: 0.2 K/UL (ref 0–0.7)
EOSINOPHIL NFR BLD AUTO: 3 %
ERYTHROCYTE [DISTWIDTH] IN BLOOD BY AUTOMATED COUNT: 4.24 M/UL (ref 4.3–5.9)
ERYTHROCYTE [DISTWIDTH] IN BLOOD: 14.1 % (ref 11.5–15.5)
HCT VFR BLD AUTO: 35.1 % (ref 39–53)
HGB BLD-MCNC: 11.3 GM/DL (ref 13–17.5)
LYMPHOCYTES # SPEC AUTO: 1 K/UL (ref 1–4.8)
LYMPHOCYTES NFR SPEC AUTO: 14 %
MCH RBC QN AUTO: 26.6 PG (ref 25–35)
MCHC RBC AUTO-ENTMCNC: 32.1 G/DL (ref 31–37)
MCV RBC AUTO: 83 FL (ref 80–100)
MONOCYTES # BLD AUTO: 0.6 K/UL (ref 0–1)
MONOCYTES NFR BLD AUTO: 8 %
NEUTROPHILS # BLD AUTO: 5 K/UL (ref 1.3–7.7)
NEUTROPHILS NFR BLD AUTO: 73 %
PLATELET # BLD AUTO: 222 K/UL (ref 150–450)
WBC # BLD AUTO: 6.8 K/UL (ref 3.8–10.6)

## 2019-06-06 PROCEDURE — 86901 BLOOD TYPING SEROLOGIC RH(D): CPT

## 2019-06-06 PROCEDURE — 86850 RBC ANTIBODY SCREEN: CPT

## 2019-06-06 PROCEDURE — 86900 BLOOD TYPING SEROLOGIC ABO: CPT

## 2019-06-06 PROCEDURE — 85025 COMPLETE CBC W/AUTO DIFF WBC: CPT

## 2019-06-06 PROCEDURE — 93005 ELECTROCARDIOGRAM TRACING: CPT

## 2019-06-10 ENCOUNTER — HOSPITAL ENCOUNTER (OUTPATIENT)
Dept: HOSPITAL 47 - OR | Age: 74
Discharge: HOME | End: 2019-06-10
Attending: SURGERY
Payer: MEDICARE

## 2019-06-10 VITALS — DIASTOLIC BLOOD PRESSURE: 62 MMHG | HEART RATE: 64 BPM | SYSTOLIC BLOOD PRESSURE: 123 MMHG

## 2019-06-10 VITALS — TEMPERATURE: 97.2 F | RESPIRATION RATE: 16 BRPM

## 2019-06-10 VITALS — BODY MASS INDEX: 36.9 KG/M2

## 2019-06-10 DIAGNOSIS — I11.0: ICD-10-CM

## 2019-06-10 DIAGNOSIS — Z79.82: ICD-10-CM

## 2019-06-10 DIAGNOSIS — I50.9: ICD-10-CM

## 2019-06-10 DIAGNOSIS — E78.5: ICD-10-CM

## 2019-06-10 DIAGNOSIS — Z86.73: ICD-10-CM

## 2019-06-10 DIAGNOSIS — M19.90: ICD-10-CM

## 2019-06-10 DIAGNOSIS — F17.210: ICD-10-CM

## 2019-06-10 DIAGNOSIS — Z79.4: ICD-10-CM

## 2019-06-10 DIAGNOSIS — K43.0: Primary | ICD-10-CM

## 2019-06-10 DIAGNOSIS — E11.9: ICD-10-CM

## 2019-06-10 DIAGNOSIS — Z79.899: ICD-10-CM

## 2019-06-10 LAB
BUN SERPL-SCNC: 30 MG/DL (ref 9–20)
GLUCOSE BLD-MCNC: 160 MG/DL (ref 75–99)
GLUCOSE BLD-MCNC: 183 MG/DL (ref 75–99)
POTASSIUM SERPL-SCNC: 4.1 MMOL/L (ref 3.5–5.1)

## 2019-06-10 PROCEDURE — 88302 TISSUE EXAM BY PATHOLOGIST: CPT

## 2019-06-10 PROCEDURE — 84132 ASSAY OF SERUM POTASSIUM: CPT

## 2019-06-10 PROCEDURE — 86850 RBC ANTIBODY SCREEN: CPT

## 2019-06-10 PROCEDURE — 86901 BLOOD TYPING SEROLOGIC RH(D): CPT

## 2019-06-10 PROCEDURE — 82565 ASSAY OF CREATININE: CPT

## 2019-06-10 PROCEDURE — 49566: CPT

## 2019-06-10 PROCEDURE — 86900 BLOOD TYPING SEROLOGIC ABO: CPT

## 2019-06-10 PROCEDURE — 84520 ASSAY OF UREA NITROGEN: CPT

## 2019-06-10 PROCEDURE — 49568: CPT

## 2019-06-10 RX ADMIN — CEFAZOLIN ONE GM: 10 INJECTION, POWDER, FOR SOLUTION INTRAVENOUS at 08:10

## 2019-06-10 RX ADMIN — CEFAZOLIN ONE GM: 10 INJECTION, POWDER, FOR SOLUTION INTRAVENOUS at 07:57

## 2019-06-10 RX ADMIN — HYDROMORPHONE HYDROCHLORIDE PRN MG: 1 INJECTION, SOLUTION INTRAMUSCULAR; INTRAVENOUS; SUBCUTANEOUS at 09:25

## 2019-06-10 RX ADMIN — HYDROMORPHONE HYDROCHLORIDE PRN MG: 1 INJECTION, SOLUTION INTRAMUSCULAR; INTRAVENOUS; SUBCUTANEOUS at 09:38

## 2019-06-10 NOTE — P.OP
Date of Procedure: 06/10/19


Preoperative Diagnosis: 


Incarcerated recurrent incisional hernia


Postoperative Diagnosis: 


Recurrent incarcerated incisional hernia


Procedure(s) Performed: 


Repair of recurrent incarcerated incisional hernia


Anesthesia: ROSE


Surgeon: Ish Gaitan


Estimated Blood Loss (ml): 10


Pathology: other (Hernia sac)


Condition: stable


Disposition: PACU


Description of Procedure: 


The patient's placed on the operating table in supine position.  He received 

general anesthesia.  His abdomen was prepped and draped usual sterile fashion.  

The patient had pain recurrent incisional hernia located above the umbilicus.  

Using a 15 blade the skin was incised and then using blunt and sharp dissection 

with cautery the subcutaneous tissues were divided away from the hernia sac.  

The hernia sac was then transected to pathology.  The fascial defect was then 

closed with figure 8-0 Ethibond suture.  A 6 x 6" piece of Prolene mesh was 

placed in the wound and secured with a secure strap tacker.  A MYNOR drains placed 

over top the repair brought out through a separate stab incision.  Manuelito's 

fascia closed with 0 Vicryl.  Skin was closed staples.  Patient top she will was

sent to recovery room stable condition.

## 2019-06-10 NOTE — P.GSHP
History of Present Illness


H&P Date: 06/10/19


Chief Complaint: Recurrent incarcerated incisional hernia





This a 74-year-old male who's had a previous ventral hernia.  The patient 

developed recurrent incarcerated incisional/ventral hernia.  He presents today 

for open repair.





Past Medical History


Past Medical History: Chest Pain / Angina, Heart Failure, CVA/TIA, Diabetes 

Mellitus, Hyperlipidemia, Hypertension, Osteoarthritis (OA)


Additional Past Medical History / Comment(s): some bruising curly arms [id number]

no residual


History of Any Multi-Drug Resistant Organisms: None Reported


Past Surgical History: Hernia Repair


Additional Past Surgical History / Comment(s): 9/11/18 robot assisted 

laprascopic umbilical hernia repair with mesh.


Past Anesthesia/Blood Transfusion Reactions: No Reported Reaction


Additional Past Anesthesia/Blood Transfusion Reaction / Comment(s): no hx blood 

transfusion


Smoking Status: Light tobacco smoker





- Past Family History


  ** Mother


Family Medical History: Cancer


Additional Family Medical History / Comment(s): breast cancer





  ** Father


Family Medical History: Unable to Obtain





  ** Sister(s)


Family Medical History: Cancer





Medications and Allergies


                                Home Medications











 Medication  Instructions  Recorded  Confirmed  Type


 


Benazepril HCl 40 mg PO QAM 12/03/17 06/10/19 History


 


glipiZIDE [Glucotrol] 20 mg PO W/BRKFST 12/03/17 06/10/19 History


 


Aspirin 81 mg PO DAILY 12/04/17 06/10/19 History


 


Insulin Glargine [Lantus] 40 unit SQ QAM 12/04/17 06/10/19 History


 


Tamsulosin [Flomax] 0.4 mg PO HS 12/04/17 06/10/19 History


 


Carvedilol [Coreg] 6.25 mg PO BID 09/05/18 06/10/19 History


 


Isosorbide Mononitrate ER [Imdur] 30 mg PO QAM 09/05/18 06/10/19 History


 


Lovastatin [Mevacor] 20 mg PO HS 09/05/18 06/10/19 History


 


Spironolactone [Aldactone] 25 mg PO QAM 09/05/18 06/10/19 History


 


Ferrous Sulfate [Iron (65  mg PO DAILY #30 tab 12/03/18 06/10/19 Rx





Elemental)]    


 


Furosemide [Lasix] 60 mg PO DAILY #30 tab 12/03/18 06/10/19 Rx


 


Multivitamins, Thera [Multivitamin 1 tab PO DAILY 06/07/19 06/10/19 History





(formulary)]    








                                    Allergies











Allergy/AdvReac Type Severity Reaction Status Date / Time


 


No Known Allergies Allergy   Verified 06/10/19 06:52














Surgical - Exam


                                   Vital Signs











Temp Pulse Resp BP Pulse Ox


 


 97.5 F L  69   18   143/63   94 L


 


 06/10/19 07:15  06/10/19 07:15  06/10/19 07:15  06/10/19 07:15  06/10/19 07:15














- General





BMI 37


well developed, well nourished, no distress





- Eyes


PERRL





- ENT


normal pinna





- Neck


no masses





- Respiratory


normal expansion





- Cardiovascular


Rhythm: regular





- Abdomen





Incarcerated ventral/incisional hernia located in the periumbilical area


Abdomen: soft, non tender





Results





- Labs


                  Abnormal Lab Results - Last 24 Hours (Table)











  06/10/19 Range/Units





  07:15 


 


POC Glucose (mg/dL)  160 H  (75-99)  mg/dL














Assessment and Plan


Assessment: 





Incarcerated recurrent incisional/ventral hernia.  We'll perform open repair.

## 2020-03-01 ENCOUNTER — HOSPITAL ENCOUNTER (INPATIENT)
Dept: HOSPITAL 47 - EC | Age: 75
LOS: 6 days | Discharge: HOME | DRG: 280 | End: 2020-03-07
Attending: INTERNAL MEDICINE | Admitting: INTERNAL MEDICINE
Payer: MEDICARE

## 2020-03-01 VITALS — BODY MASS INDEX: 33.1 KG/M2

## 2020-03-01 DIAGNOSIS — N18.3: ICD-10-CM

## 2020-03-01 DIAGNOSIS — D69.6: ICD-10-CM

## 2020-03-01 DIAGNOSIS — E66.9: ICD-10-CM

## 2020-03-01 DIAGNOSIS — Q43.8: ICD-10-CM

## 2020-03-01 DIAGNOSIS — Z71.3: ICD-10-CM

## 2020-03-01 DIAGNOSIS — I86.4: ICD-10-CM

## 2020-03-01 DIAGNOSIS — E11.649: ICD-10-CM

## 2020-03-01 DIAGNOSIS — I13.0: ICD-10-CM

## 2020-03-01 DIAGNOSIS — E11.22: ICD-10-CM

## 2020-03-01 DIAGNOSIS — R32: ICD-10-CM

## 2020-03-01 DIAGNOSIS — I25.10: ICD-10-CM

## 2020-03-01 DIAGNOSIS — D63.1: ICD-10-CM

## 2020-03-01 DIAGNOSIS — Z86.59: ICD-10-CM

## 2020-03-01 DIAGNOSIS — N39.0: ICD-10-CM

## 2020-03-01 DIAGNOSIS — K31.89: ICD-10-CM

## 2020-03-01 DIAGNOSIS — I85.00: ICD-10-CM

## 2020-03-01 DIAGNOSIS — Z83.3: ICD-10-CM

## 2020-03-01 DIAGNOSIS — Z98.890: ICD-10-CM

## 2020-03-01 DIAGNOSIS — I21.4: Primary | ICD-10-CM

## 2020-03-01 DIAGNOSIS — F17.200: ICD-10-CM

## 2020-03-01 DIAGNOSIS — Z86.73: ICD-10-CM

## 2020-03-01 DIAGNOSIS — E86.0: ICD-10-CM

## 2020-03-01 DIAGNOSIS — Z79.899: ICD-10-CM

## 2020-03-01 DIAGNOSIS — E78.5: ICD-10-CM

## 2020-03-01 DIAGNOSIS — K57.30: ICD-10-CM

## 2020-03-01 DIAGNOSIS — M19.90: ICD-10-CM

## 2020-03-01 DIAGNOSIS — I50.33: ICD-10-CM

## 2020-03-01 DIAGNOSIS — K76.6: ICD-10-CM

## 2020-03-01 DIAGNOSIS — K64.8: ICD-10-CM

## 2020-03-01 DIAGNOSIS — Z79.4: ICD-10-CM

## 2020-03-01 DIAGNOSIS — R15.9: ICD-10-CM

## 2020-03-01 DIAGNOSIS — Z80.3: ICD-10-CM

## 2020-03-01 DIAGNOSIS — Z79.82: ICD-10-CM

## 2020-03-01 DIAGNOSIS — Z86.010: ICD-10-CM

## 2020-03-01 LAB
ALBUMIN SERPL-MCNC: 2.6 G/DL (ref 3.5–5)
ALP SERPL-CCNC: 184 U/L (ref 38–126)
ALT SERPL-CCNC: 15 U/L (ref 4–49)
ANION GAP SERPL CALC-SCNC: 10 MMOL/L
APTT BLD: 24.4 SEC (ref 22–30)
AST SERPL-CCNC: 37 U/L (ref 17–59)
BASOPHILS # BLD AUTO: 0 K/UL (ref 0–0.2)
BASOPHILS NFR BLD AUTO: 0 %
BILIRUB UR QL STRIP.AUTO: (no result)
BUN SERPL-SCNC: 29 MG/DL (ref 9–20)
CALCIUM SPEC-MCNC: 8.1 MG/DL (ref 8.4–10.2)
CHLORIDE SERPL-SCNC: 101 MMOL/L (ref 98–107)
CO2 SERPL-SCNC: 25 MMOL/L (ref 22–30)
EOSINOPHIL # BLD AUTO: 0.1 K/UL (ref 0–0.7)
EOSINOPHIL NFR BLD AUTO: 1 %
ERYTHROCYTE [DISTWIDTH] IN BLOOD BY AUTOMATED COUNT: 3.95 M/UL (ref 4.3–5.9)
ERYTHROCYTE [DISTWIDTH] IN BLOOD: 14.9 % (ref 11.5–15.5)
GLUCOSE BLD-MCNC: 106 MG/DL (ref 75–99)
GLUCOSE BLD-MCNC: 119 MG/DL (ref 75–99)
GLUCOSE BLD-MCNC: 135 MG/DL (ref 75–99)
GLUCOSE BLD-MCNC: 151 MG/DL (ref 75–99)
GLUCOSE BLD-MCNC: 181 MG/DL (ref 75–99)
GLUCOSE SERPL-MCNC: 115 MG/DL (ref 74–99)
GRAN CASTS UR QL COMP ASSIST: 33 /LPF
HCT VFR BLD AUTO: 33 % (ref 39–53)
HGB BLD-MCNC: 10.7 GM/DL (ref 13–17.5)
HYALINE CASTS UR QL AUTO: 169 /LPF (ref 0–2)
INR PPP: 1.1 (ref ?–1.2)
LYMPHOCYTES # SPEC AUTO: 0.4 K/UL (ref 1–4.8)
LYMPHOCYTES NFR SPEC AUTO: 3 %
MCH RBC QN AUTO: 27.2 PG (ref 25–35)
MCHC RBC AUTO-ENTMCNC: 32.5 G/DL (ref 31–37)
MCV RBC AUTO: 83.6 FL (ref 80–100)
MONOCYTES # BLD AUTO: 0.8 K/UL (ref 0–1)
MONOCYTES NFR BLD AUTO: 6 %
NEUTROPHILS # BLD AUTO: 13.4 K/UL (ref 1.3–7.7)
NEUTROPHILS NFR BLD AUTO: 91 %
PH UR: 5.5 [PH] (ref 5–8)
PLATELET # BLD AUTO: 173 K/UL (ref 150–450)
POTASSIUM SERPL-SCNC: 3.4 MMOL/L (ref 3.5–5.1)
PROT SERPL-MCNC: 5.8 G/DL (ref 6.3–8.2)
PROT UR QL: (no result)
PT BLD: 10.9 SEC (ref 9–12)
RBC UR QL: 3 /HPF (ref 0–5)
SODIUM SERPL-SCNC: 136 MMOL/L (ref 137–145)
SP GR UR: 1.03 (ref 1–1.03)
SQUAMOUS UR QL AUTO: 6 /HPF (ref 0–4)
UROBILINOGEN UR QL STRIP: 12 MG/DL (ref ?–2)
WBC # BLD AUTO: 14.8 K/UL (ref 3.8–10.6)
WBC # UR AUTO: 19 /HPF (ref 0–5)

## 2020-03-01 PROCEDURE — 81003 URINALYSIS AUTO W/O SCOPE: CPT

## 2020-03-01 PROCEDURE — 85025 COMPLETE CBC W/AUTO DIFF WBC: CPT

## 2020-03-01 PROCEDURE — 94760 N-INVAS EAR/PLS OXIMETRY 1: CPT

## 2020-03-01 PROCEDURE — 83883 ASSAY NEPHELOMETRY NOT SPEC: CPT

## 2020-03-01 PROCEDURE — 36415 COLL VENOUS BLD VENIPUNCTURE: CPT

## 2020-03-01 PROCEDURE — 82607 VITAMIN B-12: CPT

## 2020-03-01 PROCEDURE — 85027 COMPLETE CBC AUTOMATED: CPT

## 2020-03-01 PROCEDURE — 78582 LUNG VENTILAT&PERFUS IMAGING: CPT

## 2020-03-01 PROCEDURE — 93306 TTE W/DOPPLER COMPLETE: CPT

## 2020-03-01 PROCEDURE — 85730 THROMBOPLASTIN TIME PARTIAL: CPT

## 2020-03-01 PROCEDURE — 84165 PROTEIN E-PHORESIS SERUM: CPT

## 2020-03-01 PROCEDURE — 85049 AUTOMATED PLATELET COUNT: CPT

## 2020-03-01 PROCEDURE — 80061 LIPID PANEL: CPT

## 2020-03-01 PROCEDURE — 99285 EMERGENCY DEPT VISIT HI MDM: CPT

## 2020-03-01 PROCEDURE — 71046 X-RAY EXAM CHEST 2 VIEWS: CPT

## 2020-03-01 PROCEDURE — 45330 DIAGNOSTIC SIGMOIDOSCOPY: CPT

## 2020-03-01 PROCEDURE — 83880 ASSAY OF NATRIURETIC PEPTIDE: CPT

## 2020-03-01 PROCEDURE — 82747 ASSAY OF FOLIC ACID RBC: CPT

## 2020-03-01 PROCEDURE — 84443 ASSAY THYROID STIM HORMONE: CPT

## 2020-03-01 PROCEDURE — 84484 ASSAY OF TROPONIN QUANT: CPT

## 2020-03-01 PROCEDURE — 86334 IMMUNOFIX E-PHORESIS SERUM: CPT

## 2020-03-01 PROCEDURE — 80053 COMPREHEN METABOLIC PANEL: CPT

## 2020-03-01 PROCEDURE — 83735 ASSAY OF MAGNESIUM: CPT

## 2020-03-01 PROCEDURE — 76700 US EXAM ABDOM COMPLETE: CPT

## 2020-03-01 PROCEDURE — 87040 BLOOD CULTURE FOR BACTERIA: CPT

## 2020-03-01 PROCEDURE — 80048 BASIC METABOLIC PNL TOTAL CA: CPT

## 2020-03-01 PROCEDURE — 74176 CT ABD & PELVIS W/O CONTRAST: CPT

## 2020-03-01 PROCEDURE — 83605 ASSAY OF LACTIC ACID: CPT

## 2020-03-01 PROCEDURE — 83921 ORGANIC ACID SINGLE QUANT: CPT

## 2020-03-01 PROCEDURE — 96374 THER/PROPH/DIAG INJ IV PUSH: CPT

## 2020-03-01 PROCEDURE — 80074 ACUTE HEPATITIS PANEL: CPT

## 2020-03-01 PROCEDURE — 87502 INFLUENZA DNA AMP PROBE: CPT

## 2020-03-01 PROCEDURE — 86431 RHEUMATOID FACTOR QUANT: CPT

## 2020-03-01 PROCEDURE — 81001 URINALYSIS AUTO W/SCOPE: CPT

## 2020-03-01 PROCEDURE — 86038 ANTINUCLEAR ANTIBODIES: CPT

## 2020-03-01 PROCEDURE — 94640 AIRWAY INHALATION TREATMENT: CPT

## 2020-03-01 PROCEDURE — 83036 HEMOGLOBIN GLYCOSYLATED A1C: CPT

## 2020-03-01 PROCEDURE — 93005 ELECTROCARDIOGRAM TRACING: CPT

## 2020-03-01 PROCEDURE — 87086 URINE CULTURE/COLONY COUNT: CPT

## 2020-03-01 PROCEDURE — 85610 PROTHROMBIN TIME: CPT

## 2020-03-01 RX ADMIN — ISOSORBIDE MONONITRATE SCH MG: 30 TABLET, EXTENDED RELEASE ORAL at 14:36

## 2020-03-01 RX ADMIN — SPIRONOLACTONE SCH MG: 25 TABLET, FILM COATED ORAL at 14:35

## 2020-03-01 RX ADMIN — HEPARIN SODIUM SCH MLS/HR: 10000 INJECTION, SOLUTION INTRAVENOUS at 07:00

## 2020-03-01 RX ADMIN — ASPIRIN 81 MG CHEWABLE TABLET SCH MG: 81 TABLET CHEWABLE at 14:36

## 2020-03-01 RX ADMIN — POTASSIUM CHLORIDE SCH MEQ: 20 TABLET, EXTENDED RELEASE ORAL at 20:36

## 2020-03-01 RX ADMIN — ATORVASTATIN CALCIUM SCH MG: 80 TABLET, FILM COATED ORAL at 13:23

## 2020-03-01 RX ADMIN — TAMSULOSIN HYDROCHLORIDE SCH MG: 0.4 CAPSULE ORAL at 20:36

## 2020-03-01 RX ADMIN — CARVEDILOL SCH MG: 6.25 TABLET, FILM COATED ORAL at 20:36

## 2020-03-01 NOTE — P.CRDCN
History of Present Illness


Consult date: 03/01/20


Consult reason: chest pain


History of present illness: 


The patient is a 74-year-old male with past medical history diabetes mellitus, 

CVA/TIA, hyperlipidemia,hypertension, and obesity, who presents to the hospital 

with mental status changes, dizziness, and diaphoresis.  His son states that 

approximately midnight last night he went to check on his father, and he found 

him slumped over half weaning off the bed.  Occasionally he will become diso

riented and diaphoretic when he is hypoglycemic, however his glucose was in the 

150s.  He states he was assisting him back into bed when he became incontinent 

of stool, and was unaware of his surroundings.  According to his son he was 

breathing hard although the patient did not complain of shortness of breath 

until arrival to the hospital.





EKG on arrival shows sinus mechanism with new T-wave inversions in V6.  Changes 

in aVL are consistent with prior EKG.  Initial troponin 0.862.  BNP 10,060 Chest

x-ray shows mild pulmonary interstitial edema, with no overt heart failure or 

pulmonary consolidation.





On exam this morning the patient is lying comfortably in bed with his family at 

the bedside.  He states he is experiencing an increase in fatigue and mild 

shortness of breath.  He denies any chest pain, chest pressure, palpitations, 

dizziness, or lightheadedness.  He states he has not been up walking since his 

arrival.





PAST MEDICAL HISTORY: 


Diabetes mellitus type 2, CVA/TIA, hyperlipidemia, hypertension, obesity





REVIEW OF SYSTEMS: No fever or chills. No cough or expectoration. No 

diaphoresis. Patient denies headache, dizziness, blurred vision, double vision. 

Patient denies any stomach discomfort. No nausea, vomiting. No hematochezia. No 

hematemesis. Denies any black stools or blood in his stools. Denies dysuria or 

hematuria. No muscle weakness or numbness. 





PHYSICAL EXAMINATION: This is a 74-year-old male in no apparent distress at the 

time of my examination.


HEENT: Head is atraumatic, normocephalic. Pupils are equal, round. Sclerae 

anicteric. Conjunctivae are clear. Mucous membranes of the mouth are moist. Neck

is supple. There is no jugular venous distention. No carotid bruit is heard. 


CHEST EXAMINATION: Lungs are diminished bilaterally.  No chest wall tenderness 

is noted on palpation or with deep breathing. 


HEART EXAMINATION: Heart regular rate and rhythm. S1, S2 heard. No murmurs, 

gallops or rub.


ABDOMEN: Soft, nontender. Bowel sounds are heard. No organomegaly noted. 


EXTREMITIES: 2+ peripheral pulses.  Mild lower extremity edema, worse on the 

left. no calf tenderness noted. 


NEUROLOGIC EXAMINATION: Patient is awake, alert and oriented x3. 





LABORATORY DATA: WBC 14.8, hemoglobin 10.7, hematocrit 33.0, platelet 173, 

sodium 136, potassium 3.4, BUN 29, creatinine 1.54, AST 37, ALT 15, troponin 

0.862, 1.6-0, BNP 10,600. 


Vital signs: Blood pressure 113/45, respirations 20, heart rate 75, temperature 

98.9, SpO2 96% on 4 L nasal cannula





FINAL ASSESSMENT AND PLAN: 


#1 non-ST elevated myocardial infarction, troponins trending upward


#2 congestive heart failure, elevated BNP, EF in 2018 1840-45%


#3 hypertension


#4 worsening shortness of breath, VQ scan negative


#5 diabetes mellitus


#6 chronic kidney disease, stage III





PLAN: We will start IV Lasix 40 mg IV push.  Continue heparin drip and resume 

home medications.  We'll switch the patient to high intensity statin therapy.  

Echocardiogram to be done tomorrow morning.  Consider coronary angiography if 

patient develops chest discomfort.








Past Medical History


Past Medical History: Chest Pain / Angina, Heart Failure, CVA/TIA, Diabetes 

Mellitus, Hyperlipidemia, Hypertension, Osteoarthritis (OA)


Additional Past Medical History / Comment(s): incontinent of stools 

intermittently. low iron levels, possible GI bleed-dark tarry stools per son, 

Son stated "has had recent falls related to blood sugar going low 60s"- 2017 

and 2018 had episodes of CHF approx 7-10 days post receiving flu 

vaccine,TIA,Type2 IDDM


History of Any Multi-Drug Resistant Organisms: None Reported


Past Surgical History: Hernia Repair


Additional Past Surgical History / Comment(s): 9/11/18 robot assisted 

laprascopic umbilical hernia repair with mesh., 6/10/19 repair recurrent 

incarerated hernia


Past Anesthesia/Blood Transfusion Reactions: No Reported Reaction


Additional Past Anesthesia/Blood Transfusion Reaction / Comment(s): never had 

anesthesia


Past Psychological History: Depression


Smoking Status: Current every day smoker


Past Alcohol Use History: None Reported


Past Drug Use History: None Reported





- Past Family History


  ** Mother


Family Medical History: Cancer


Additional Family Medical History / Comment(s): breast cancer





  ** Father


Family Medical History: Unable to Obtain





  ** Sister(s)


Family Medical History: Cancer





Medications and Allergies


                                Home Medications











 Medication  Instructions  Recorded  Confirmed  Type


 


Benazepril HCl 40 mg PO QAM 12/03/17 03/01/20 History


 


Aspirin 81 mg PO DAILY 12/04/17 03/01/20 History


 


Tamsulosin [Flomax] 0.4 mg PO HS 12/04/17 03/01/20 History


 


Carvedilol [Coreg] 6.25 mg PO BID 09/05/18 03/01/20 History


 


Isosorbide Mononitrate ER [Imdur] 30 mg PO QAM 09/05/18 03/01/20 History


 


Lovastatin [Mevacor] 20 mg PO HS 09/05/18 03/01/20 History


 


Spironolactone [Aldactone] 12.5 mg PO QAM 09/05/18 03/01/20 History


 


Ferrous Sulfate [Iron (65  mg PO DAILY #30 tab 12/03/18 03/01/20 Rx





Elemental)]    


 


Furosemide [Lasix] 20 mg PO DAILY 12/26/19 03/01/20 History


 


Insulin Glargine,Hum.rec.anlog 40 unit SQ [facility] 01/08/20 03/01/20 History





[Basaglar Kwikpen U-100]    








                                    Allergies











Allergy/AdvReac Type Severity Reaction Status Date / Time


 


No Known Allergies Allergy   Verified 03/01/20 11:12














Physical Exam


Vitals: 


                                   Vital Signs











  Temp Pulse Resp BP Pulse Ox


 


 03/01/20 08:00    20  


 


 03/01/20 07:00   74  20  113/45  97


 


 03/01/20 06:30   72  20  116/52  97


 


 03/01/20 06:00   80  20  137/73  99


 


 03/01/20 05:59  98.9 F     96


 


 03/01/20 05:30   88   110/52 


 


 03/01/20 05:00     110/52 


 


 03/01/20 04:30   87   117/54  93 L


 


 03/01/20 04:24    20  


 


 03/01/20 04:20   93   124/57  94 L


 


 03/01/20 04:16  99.8 F H  92  20  124/57  95








                                Intake and Output











 02/29/20 03/01/20 03/01/20





 22:59 06:59 14:59


 


Intake Total   240


 


Balance   240


 


Intake:   


 


  Oral   240


 


Other:   


 


  Weight  102.512 kg 














Results





                                 03/01/20 04:18





                                 03/01/20 04:18


                                 Cardiac Enzymes











  03/01/20 03/01/20 03/01/20 Range/Units





  04:18 04:18 10:38 


 


AST  37    (17-59)  U/L


 


Troponin I   0.862 H*  1.620 H*  (0.000-0.034)  ng/mL








                                   Coagulation











  03/01/20 Range/Units





  04:18 


 


PT  10.9  (9.0-12.0)  sec


 


APTT  24.4  (22.0-30.0)  sec








                                       CBC











  03/01/20 Range/Units





  04:18 


 


WBC  14.8 H  (3.8-10.6)  k/uL


 


RBC  3.95 L  (4.30-5.90)  m/uL


 


Hgb  10.7 L  (13.0-17.5)  gm/dL


 


Hct  33.0 L  (39.0-53.0)  %


 


Plt Count  173  (150-450)  k/uL








                          Comprehensive Metabolic Panel











  03/01/20 Range/Units





  04:18 


 


Sodium  136 L  (137-145)  mmol/L


 


Potassium  3.4 L  (3.5-5.1)  mmol/L


 


Chloride  101  ()  mmol/L


 


Carbon Dioxide  25  (22-30)  mmol/L


 


BUN  29 H  (9-20)  mg/dL


 


Creatinine  1.54 H  (0.66-1.25)  mg/dL


 


Glucose  115 H  (74-99)  mg/dL


 


Calcium  8.1 L  (8.4-10.2)  mg/dL


 


AST  37  (17-59)  U/L


 


ALT  15  (4-49)  U/L


 


Alkaline Phosphatase  184 H  ()  U/L


 


Total Protein  5.8 L  (6.3-8.2)  g/dL


 


Albumin  2.6 L  (3.5-5.0)  g/dL








                               Current Medications











Generic Name Dose Route Start Last Admin





  Trade Name Freq  PRN Reason Stop Dose Admin


 


Aspirin  81 mg  03/02/20 09:00 





  Aspirin  PO  





  DAILY [facility]  


 


Atorvastatin Calcium  80 mg  03/01/20 12:30 





  Lipitor  PO  





  DAILY [facility]  


 


Carvedilol  3.125 mg  03/01/20 17:30 





  Coreg  PO  





  BID-W/MEALS [facility]  


 


Furosemide  40 mg  03/01/20 12:30 





  Lasix  IV  





  DAILY [facility]  


 


Heparin Sodium (Porcine)  0 unit  03/01/20 06:31 





  Heparin  IV  





  Q6HR PRN  





  Low PTT  





  Protocol  


 


Heparin Sodium/Sodium Chloride  250 mls @ 10.005 mls/hr  03/01/20 06:45  

03/01/20 07:00





  25,000 unit/ Sodium Chloride  IV   9.76 units/kg/hr





  .Q24H SOURAV   10.005 mls/hr





    Administration





  Protocol  





  9.76 UNITS/KG/HR  








                                Intake and Output











 02/29/20 03/01/20 03/01/20





 22:59 06:59 14:59


 


Intake Total   240


 


Balance   240


 


Intake:   


 


  Oral   240


 


Other:   


 


  Weight  102.512 kg 








                                        





                                 03/01/20 04:18 





                                 03/01/20 04:18

## 2020-03-01 NOTE — XR
EXAMINATION TYPE: XR chest 2V

 

DATE OF EXAM: 3/1/2020

 

COMPARISON: 11/29/2018

 

HISTORY: Cough. Short of breath

 

TECHNIQUE:

 

 

Heart is borderline enlarged. There is no heart failure. There are chest leads. Costophrenic angles a
re clear. There is no pulmonary consolidation. There is no sign of pleural effusion.

 

IMPRESSION:

No active cardiopulmonary disease. There is clearing of the mild pulmonary interstitial edema compare
d to old exam.

## 2020-03-01 NOTE — ED
SOB HPI





- General


Chief Complaint: Shortness of Breath


Stated Complaint: SOB


Source: patient, EMS


Mode of arrival: EMS


Limitations: no limitations





- History of Present Illness


Initial Comments: 


Luis is an obese 74-year-old gentleman with a history of hypertension 

diabetes who presents to the ER today for evaluation of multiple vague 

complaints.  Son reports that the patient was in his usual state of health 

around dinnertime, he went to check on his dad and found him kind of and a half 

off the bed position though he hadn't fallen.  His dad seemed to be out of it 

and had incontinence of bowel and bladder which she has experienced in the past 

and is hypoglycemic.  Son checked his dad's blood sugar noted that it was in the

150s and then checked his vitals noticed that his heart rate also seemed to be 

in the 150s and he was mildly hypertensive.  Son helped cleaned and his dad up 

and get him back to bed.  He did note that his dad was very sweaty like he was 

breathing hard.  The patient himself didn't offer any complaints reported that 

he was feeling okay.  Didn't really recall the event that happened at home.  

Patient denies any chest pain palpitations, he did report your short of breath 

until the ambulance arrived at which time she was given a breathing treatment 

seemed to feel better.  Patient and son deny any recent fevers chills nausea 

vomiting.





- Related Data


                                Home Medications











 Medication  Instructions  Recorded  Confirmed


 


Benazepril HCl 40 mg PO QAM 12/03/17 01/10/20


 


Aspirin 81 mg PO DAILY 12/04/17 01/08/20


 


Tamsulosin [Flomax] 0.4 mg PO HS 12/04/17 01/10/20


 


Carvedilol [Coreg] 6.25 mg PO BID 09/05/18 01/10/20


 


Isosorbide Mononitrate ER [Imdur] 30 mg PO QAM 09/05/18 01/10/20


 


Lovastatin [Mevacor] 20 mg PO HS 09/05/18 01/10/20


 


Spironolactone [Aldactone] 12.5 mg PO QAM 09/05/18 01/10/20


 


Multivitamins, Thera [Multivitamin 1 tab PO DAILY 06/07/19 01/08/20





(formulary)]   


 


Furosemide [Lasix] 20 mg PO DAILY 12/26/19 01/10/20


 


Cholecalciferol (Vitamin D3) 2,000 unit PO DAILY 01/08/20 01/08/20





[Vitamin D3]   


 


Insulin Glargine,Hum.rec.anlog 40 unit SQ QAM 01/08/20 01/10/20





[Basaglar Kwikpen U-100]   








                                  Previous Rx's











 Medication  Instructions  Recorded


 


Ferrous Sulfate [Iron (65  mg PO DAILY #30 tab 12/03/18





Elemental)]  











                                    Allergies











Allergy/AdvReac Type Severity Reaction Status Date / Time


 


No Known Allergies Allergy   Verified 01/10/20 07:28














Review of Systems


ROS Statement: 


Those systems with pertinent positive or pertinent negative responses have been 

documented in the HPI.





ROS Other: All systems not noted in ROS Statement are negative.





Past Medical History


Past Medical History: Chest Pain / Angina, Heart Failure, CVA/TIA, Diabetes 

Mellitus, Hyperlipidemia, Hypertension, Osteoarthritis (OA)


Additional Past Medical History / Comment(s): incontinent of stools 

intermittently. low iron levels, possible GI bleed-dark tarry stools per son, 

Son stated "has had recent falls related to blood sugar going low 60s"-HX 2017 

and 2018 had episodes of CHF approx 7-10 days post receiving flu va

ccine,TIA,Type2 IDDM


History of Any Multi-Drug Resistant Organisms: None Reported


Past Surgical History: Hernia Repair


Additional Past Surgical History / Comment(s): 9/11/18 robot assisted 

laprascopic umbilical hernia repair with mesh., 6/10/19 repair recurrent 

incarerated hernia


Past Anesthesia/Blood Transfusion Reactions: No Reported Reaction


Additional Past Anesthesia/Blood Transfusion Reaction / Comment(s): never had 

anesthesia


Past Psychological History: Depression


Smoking Status: Current every day smoker


Past Alcohol Use History: None Reported


Past Drug Use History: None Reported





- Past Family History


  ** Mother


Family Medical History: Cancer


Additional Family Medical History / Comment(s): breast cancer





  ** Father


Family Medical History: Unable to Obtain





  ** Sister(s)


Family Medical History: Cancer





General Exam





- General Exam Comments


Initial Comments: 


Physical Exam


GENERAL:


Elderly, chronically ill-appearing





HENT:


Normocephalic, Atraumatic. 





EYES:


PERRL, EOMI


No conjunctival pallor





PULMONARY:


Mild expiratory wheezing





CARDIOVASCULAR:


There is a regular rate and rhythm without any murmurs gallops or rubs.  





ABDOMEN:


Soft and nontender with normal bowel sounds. 


Obese with healed surgical scars on the abdomen





SKIN:


Skin is clear with no lesions or rashes and otherwise unremarkable.





: 


Deferred





NEUROLOGIC:


Patient is alert and oriented x3.  Moving all extremities spontaneously





MUSCULOSKELETAL:


Normal extremities with adequate strength and full range of motion.  


1+ pitting edema bilateral feet





PSYCHIATRIC:


Normal psychiatric evaluation.





Limitations: no limitations





Course


                                   Vital Signs











  03/01/20 03/01/20 03/01/20





  04:16 04:20 04:24


 


Temperature 99.8 F H  


 


Pulse Rate 92 93 


 


Respiratory 20  20





Rate   


 


Blood Pressure 124/57 124/57 


 


O2 Sat by Pulse 95 94 L 





Oximetry   














  03/01/20 03/01/20 03/01/20





  04:30 05:00 05:30


 


Temperature   


 


Pulse Rate 87  88


 


Respiratory   





Rate   


 


Blood Pressure 117/54 110/52 110/52


 


O2 Sat by Pulse 93 L  





Oximetry   














  03/01/20





  05:59


 


Temperature 98.9 F


 


Pulse Rate 


 


Respiratory 





Rate 


 


Blood Pressure 


 


O2 Sat by Pulse 96





Oximetry 














Medical Decision Making





- Medical Decision Making


The patient was seen and evaluated history is obtained from patient, EMS and son

at bedside


History and physical exam concerning for possible heart failure labs and imaging

were obtained





Labs resulted with multiple significant abnormalities most concerning a elevated

troponin, EKG appeared nonischemic


Chest x-ray were unremarkable


Patient has chronic kidney disease unchanged from previous


Given the patient's kidney disease is not a candidate for CT pulmonary embolism 

study therefore he'll be started on low-dose heparin and a VQ scan will be 

ordered.  Serial troponins as well as hemoglobin will be obtained given the 

patient's anemia.





Patient care was discussed with Dr. Tafoya who agrees with this plan








- Lab Data


Result diagrams: 


                                 03/01/20 04:18





                                 03/01/20 04:18


                                   Lab Results











  03/01/20 03/01/20 03/01/20 Range/Units





  04:18 04:18 04:18 


 


WBC  14.8 H    (3.8-10.6)  k/uL


 


RBC  3.95 L    (4.30-5.90)  m/uL


 


Hgb  10.7 L    (13.0-17.5)  gm/dL


 


Hct  33.0 L    (39.0-53.0)  %


 


MCV  83.6    (80.0-100.0)  fL


 


MCH  27.2    (25.0-35.0)  pg


 


MCHC  32.5    (31.0-37.0)  g/dL


 


RDW  14.9    (11.5-15.5)  %


 


Plt Count  173    (150-450)  k/uL


 


Neutrophils %  91    %


 


Lymphocytes %  3    %


 


Monocytes %  6    %


 


Eosinophils %  1    %


 


Basophils %  0    %


 


Neutrophils #  13.4 H    (1.3-7.7)  k/uL


 


Lymphocytes #  0.4 L    (1.0-4.8)  k/uL


 


Monocytes #  0.8    (0-1.0)  k/uL


 


Eosinophils #  0.1    (0-0.7)  k/uL


 


Basophils #  0.0    (0-0.2)  k/uL


 


PT   10.9   (9.0-12.0)  sec


 


INR   1.1   (<1.2)  


 


APTT   24.4   (22.0-30.0)  sec


 


Sodium    136 L  (137-145)  mmol/L


 


Potassium    3.4 L  (3.5-5.1)  mmol/L


 


Chloride    101  ()  mmol/L


 


Carbon Dioxide    25  (22-30)  mmol/L


 


Anion Gap    10  mmol/L


 


BUN    29 H  (9-20)  mg/dL


 


Creatinine    1.54 H  (0.66-1.25)  mg/dL


 


Est GFR (CKD-EPI)AfAm    50  (>60 ml/min/1.73 sqM)  


 


Est GFR (CKD-EPI)NonAf    44  (>60 ml/min/1.73 sqM)  


 


Glucose    115 H  (74-99)  mg/dL


 


Plasma Lactic Acid Jesse     (0.7-2.0)  mmol/L


 


Calcium    8.1 L  (8.4-10.2)  mg/dL


 


Total Bilirubin    1.0  (0.2-1.3)  mg/dL


 


AST    37  (17-59)  U/L


 


ALT    15  (4-49)  U/L


 


Alkaline Phosphatase    184 H  ()  U/L


 


Troponin I     (0.000-0.034)  ng/mL


 


Total Protein    5.8 L  (6.3-8.2)  g/dL


 


Albumin    2.6 L  (3.5-5.0)  g/dL


 


Urine Color     


 


Urine Appearance     (Clear)  


 


Urine pH     (5.0-8.0)  


 


Ur Specific Gravity     (1.001-1.035)  


 


Urine Protein     (Negative)  


 


Urine Glucose (UA)     (Negative)  


 


Urine Ketones     (Negative)  


 


Urine Blood     (Negative)  


 


Urine Nitrite     (Negative)  


 


Urine Bilirubin     (Negative)  


 


Urine Urobilinogen     (<2.0)  mg/dL


 


Ur Leukocyte Esterase     (Negative)  


 


Urine RBC     (0-5)  /hpf


 


Urine WBC     (0-5)  /hpf


 


Urine WBC Clumps     (None)  /hpf


 


Ur Squamous Epith Cells     (0-4)  /hpf


 


Urine Bacteria     (None)  /hpf


 


Hyaline Casts     (0-2)  /lpf


 


Granular Casts     (0)  /lpf


 


Urine Mucus     (None)  /hpf


 


Influenza Type A RNA     (Not Detectd)  


 


Influenza Type B (PCR)     (Not Detectd)  














  03/01/20 03/01/20 03/01/20 Range/Units





  04:18 04:18 04:32 


 


WBC     (3.8-10.6)  k/uL


 


RBC     (4.30-5.90)  m/uL


 


Hgb     (13.0-17.5)  gm/dL


 


Hct     (39.0-53.0)  %


 


MCV     (80.0-100.0)  fL


 


MCH     (25.0-35.0)  pg


 


MCHC     (31.0-37.0)  g/dL


 


RDW     (11.5-15.5)  %


 


Plt Count     (150-450)  k/uL


 


Neutrophils %     %


 


Lymphocytes %     %


 


Monocytes %     %


 


Eosinophils %     %


 


Basophils %     %


 


Neutrophils #     (1.3-7.7)  k/uL


 


Lymphocytes #     (1.0-4.8)  k/uL


 


Monocytes #     (0-1.0)  k/uL


 


Eosinophils #     (0-0.7)  k/uL


 


Basophils #     (0-0.2)  k/uL


 


PT     (9.0-12.0)  sec


 


INR     (<1.2)  


 


APTT     (22.0-30.0)  sec


 


Sodium     (137-145)  mmol/L


 


Potassium     (3.5-5.1)  mmol/L


 


Chloride     ()  mmol/L


 


Carbon Dioxide     (22-30)  mmol/L


 


Anion Gap     mmol/L


 


BUN     (9-20)  mg/dL


 


Creatinine     (0.66-1.25)  mg/dL


 


Est GFR (CKD-EPI)AfAm     (>60 ml/min/1.73 sqM)  


 


Est GFR (CKD-EPI)NonAf     (>60 ml/min/1.73 sqM)  


 


Glucose     (74-99)  mg/dL


 


Plasma Lactic Acid Jesse  1.4    (0.7-2.0)  mmol/L


 


Calcium     (8.4-10.2)  mg/dL


 


Total Bilirubin     (0.2-1.3)  mg/dL


 


AST     (17-59)  U/L


 


ALT     (4-49)  U/L


 


Alkaline Phosphatase     ()  U/L


 


Troponin I   0.862 H*   (0.000-0.034)  ng/mL


 


Total Protein     (6.3-8.2)  g/dL


 


Albumin     (3.5-5.0)  g/dL


 


Urine Color     


 


Urine Appearance     (Clear)  


 


Urine pH     (5.0-8.0)  


 


Ur Specific Gravity     (1.001-1.035)  


 


Urine Protein     (Negative)  


 


Urine Glucose (UA)     (Negative)  


 


Urine Ketones     (Negative)  


 


Urine Blood     (Negative)  


 


Urine Nitrite     (Negative)  


 


Urine Bilirubin     (Negative)  


 


Urine Urobilinogen     (<2.0)  mg/dL


 


Ur Leukocyte Esterase     (Negative)  


 


Urine RBC     (0-5)  /hpf


 


Urine WBC     (0-5)  /hpf


 


Urine WBC Clumps     (None)  /hpf


 


Ur Squamous Epith Cells     (0-4)  /hpf


 


Urine Bacteria     (None)  /hpf


 


Hyaline Casts     (0-2)  /lpf


 


Granular Casts     (0)  /lpf


 


Urine Mucus     (None)  /hpf


 


Influenza Type A RNA    Not Detected  (Not Detectd)  


 


Influenza Type B (PCR)    Not Detected  (Not Detectd)  














  03/01/20 Range/Units





  05:39 


 


WBC   (3.8-10.6)  k/uL


 


RBC   (4.30-5.90)  m/uL


 


Hgb   (13.0-17.5)  gm/dL


 


Hct   (39.0-53.0)  %


 


MCV   (80.0-100.0)  fL


 


MCH   (25.0-35.0)  pg


 


MCHC   (31.0-37.0)  g/dL


 


RDW   (11.5-15.5)  %


 


Plt Count   (150-450)  k/uL


 


Neutrophils %   %


 


Lymphocytes %   %


 


Monocytes %   %


 


Eosinophils %   %


 


Basophils %   %


 


Neutrophils #   (1.3-7.7)  k/uL


 


Lymphocytes #   (1.0-4.8)  k/uL


 


Monocytes #   (0-1.0)  k/uL


 


Eosinophils #   (0-0.7)  k/uL


 


Basophils #   (0-0.2)  k/uL


 


PT   (9.0-12.0)  sec


 


INR   (<1.2)  


 


APTT   (22.0-30.0)  sec


 


Sodium   (137-145)  mmol/L


 


Potassium   (3.5-5.1)  mmol/L


 


Chloride   ()  mmol/L


 


Carbon Dioxide   (22-30)  mmol/L


 


Anion Gap   mmol/L


 


BUN   (9-20)  mg/dL


 


Creatinine   (0.66-1.25)  mg/dL


 


Est GFR (CKD-EPI)AfAm   (>60 ml/min/1.73 sqM)  


 


Est GFR (CKD-EPI)NonAf   (>60 ml/min/1.73 sqM)  


 


Glucose   (74-99)  mg/dL


 


Plasma Lactic Acid Jesse   (0.7-2.0)  mmol/L


 


Calcium   (8.4-10.2)  mg/dL


 


Total Bilirubin   (0.2-1.3)  mg/dL


 


AST   (17-59)  U/L


 


ALT   (4-49)  U/L


 


Alkaline Phosphatase   ()  U/L


 


Troponin I   (0.000-0.034)  ng/mL


 


Total Protein   (6.3-8.2)  g/dL


 


Albumin   (3.5-5.0)  g/dL


 


Urine Color  Orange  


 


Urine Appearance  Cloudy  (Clear)  


 


Urine pH  5.5  (5.0-8.0)  


 


Ur Specific Gravity  1.026  (1.001-1.035)  


 


Urine Protein  2+ H  (Negative)  


 


Urine Glucose (UA)  Negative  (Negative)  


 


Urine Ketones  Negative  (Negative)  


 


Urine Blood  Trace H  (Negative)  


 


Urine Nitrite  Negative  (Negative)  


 


Urine Bilirubin  1+ H  (Negative)  


 


Urine Urobilinogen  12.0  (<2.0)  mg/dL


 


Ur Leukocyte Esterase  Trace H  (Negative)  


 


Urine RBC  3  (0-5)  /hpf


 


Urine WBC  19 H  (0-5)  /hpf


 


Urine WBC Clumps  Few H  (None)  /hpf


 


Ur Squamous Epith Cells  6 H  (0-4)  /hpf


 


Urine Bacteria  Rare H  (None)  /hpf


 


Hyaline Casts  169 H  (0-2)  /lpf


 


Granular Casts  33  (0)  /lpf


 


Urine Mucus  Occasional H  (None)  /hpf


 


Influenza Type A RNA   (Not Detectd)  


 


Influenza Type B (PCR)   (Not Detectd)  














- EKG Data


-: EKG Interpreted by Me


EKG Comments: 


EKG was obtained due to shortness of breath and tachycardia, EKG was obtained at

4:19 AM, rate is 93, sinus prolonged IA first-degree AV block, normal axis, IA 

prolonged at 268, , QTC is 467 no obvious ST elevations or T-wave 

inversion noted in B6 ST depression noted in aVL.  Frequent PVCs noted.  No 

acute ST elevation or STEMI criteria.








Disposition


Clinical Impression: 


 Elevated troponin, Chronic anemia, Chronic kidney disease, Hypertension, 

essential





Disposition: ADMITTED AS IP TO THIS HOSP


Condition: Serious


Is patient prescribed a controlled substance at d/c from ED?: No


Referrals: 


Merry Grimaldo MD [Primary Care Provider] - 1-2 days

## 2020-03-01 NOTE — NM
EXAMINATION TYPE: NM pul vent and perfuse

 

DATE OF EXAM: 3/1/2020

 

COMPARISON: NONE

 

HISTORY: Difficulty breathing

 

TECHNIQUE:  Utilizing inhalation of 38.9 mCi Tc 99m DTPA aerosol and intravenous injection of 4.6 mCi
 of Tc 99m MAA, ventilation and perfusion images are acquired post injection in multiple projections.


 

FINDINGS: 

Perfusion images appear normal. There is patchy uptake of radiopharmaceutical in the ventilation imag
es. There are no VQ mismatches. There is some activity within the stomach.

 

IMPRESSION: 

 

THIS EXAMINATION IS LOW PROBABILITY FOR PULMONARY EMBOLUS.

## 2020-03-01 NOTE — P.HPIM
History of Present Illness


H&P Date: 03/01/20





Patient is a 74-year-old male who presented to [facility] 

emergency room via EMS with multiple vague symptoms including confusion and 

mental status changes, episodes of shortness of breath he was evaluated in the 

emergency room, he had evidence of urinary tract infection and evidence of 

elevated troponin level, he also had evidence of dehydration was elevated BUN 

and creatinine he was admitted to telemetry floor for further evaluation and 

cardiology consultation was requested.  Patient was seen and examined on the 

telemetry floor he is alert and oriented 3 in no apparent distress, he denies 

any chest pain or shortness of breath there is no fever or chills no headache or

dizziness no nausea or vomiting no abdominal pain no diarrhea no burning was 

urination no frequency or urgency and no hematuria





Past Medical History


Past Medical History: Chest Pain / Angina, Heart Failure, CVA/TIA, Diabetes 

Mellitus, Hyperlipidemia, Hypertension, Osteoarthritis (OA)


Additional Past Medical History / Comment(s): incontinent of stools 

intermittently. low iron levels, possible GI bleed-dark tarry stools per son, 

Son stated "has had recent falls related to blood sugar going low 60s"-HX 2017 

and 2018 had episodes of CHF approx 7-10 days post receiving flu 

vaccine,TIA,Type2 IDDM


History of Any Multi-Drug Resistant Organisms: None Reported


Past Surgical History: Hernia Repair


Additional Past Surgical History / Comment(s): 9/11/18 robot assisted 

laprascopic umbilical hernia repair with mesh., 6/10/19 repair recurrent 

incarerated hernia


Past Anesthesia/Blood Transfusion Reactions: No Reported Reaction


Additional Past Anesthesia/Blood Transfusion Reaction / Comment(s): never had 

anesthesia


Past Psychological History: Depression


Smoking Status: Current every day smoker


Past Alcohol Use History: None Reported


Past Drug Use History: None Reported





- Past Family History


  ** Mother


Family Medical History: Cancer


Additional Family Medical History / Comment(s): breast cancer





  ** Father


Family Medical History: Unable to Obtain





  ** Sister(s)


Family Medical History: Cancer





  ** Brother(s)


Family Medical History: Diabetes Mellitus





Medications and Allergies


                                Home Medications











 Medication  Instructions  Recorded  Confirmed  Type


 


Benazepril HCl 40 mg PO QAM 12/03/17 03/01/20 History


 


Aspirin 81 mg PO DAILY 12/04/17 03/01/20 History


 


Tamsulosin [Flomax] 0.4 mg PO HS 12/04/17 03/01/20 History


 


Carvedilol [Coreg] 6.25 mg PO BID 09/05/18 03/01/20 History


 


Isosorbide Mononitrate ER [Imdur] 30 mg PO QAM 09/05/18 03/01/20 History


 


Lovastatin [Mevacor] 20 mg PO HS 09/05/18 03/01/20 History


 


Spironolactone [Aldactone] 12.5 mg PO QAM 09/05/18 03/01/20 History


 


Ferrous Sulfate [Iron (65  mg PO DAILY #30 tab 12/03/18 03/01/20 Rx





Elemental)]    


 


Furosemide [Lasix] 20 mg PO DAILY 12/26/19 03/01/20 History


 


Insulin Glargine,Hum.rec.anlog 40 unit SQ QA 01/08/20 03/01/20 History





[Basaglar Kwikpen U-100]    








                                    Allergies











Allergy/AdvReac Type Severity Reaction Status Date / Time


 


No Known Allergies Allergy   Verified 03/01/20 11:12














Physical Exam


Vitals: 


                                   Vital Signs











  Temp Pulse Pulse Resp BP BP Pulse Ox


 


 03/01/20 12:00  98.7 F   67  20   134/68  96


 


 03/01/20 08:00  98.0 F   74  20   113/46  94 L


 


 03/01/20 07:00   75   20  113/45   97


 


 03/01/20 06:30   72   20  116/52   97


 


 03/01/20 06:00   80   20  137/73   99


 


 03/01/20 05:59  98.9 F       96


 


 03/01/20 05:30   88    110/52  


 


 03/01/20 05:00      110/52  


 


 03/01/20 04:30   87    117/54   93 L


 


 03/01/20 04:24     20   


 


 03/01/20 04:20   93    124/57   94 L


 


 03/01/20 04:16  99.8 F H  92   20  124/57   95








                                Intake and Output











 02/29/20 03/01/20 03/01/20





 22:59 06:59 14:59


 


Intake Total   313.032


 


Output Total   100


 


Balance   213.032


 


Intake:   


 


  IV   10


 


    Invasive Line 1   10


 


  Intake, IV Titration   63.032





  Amount   


 


    Heparin Sod,Pork in 0.45%   63.032





    NaCl 25,000 unit In 0.45   





    % NaCl 1 250ml.bag @ 9.76   





    UNITS/KG/HR 10.005 mls/   





    hr IV .Q24H SOURAV Rx#:   





    [id number]   


 


  Oral   240


 


Output:   


 


  Urine   100


 


Other:   


 


  # Voids   1


 


  Weight  102.512 kg 














In general patient is alert and oriented 3 in no apparent distress


HEENT head normocephalic and atraumatic


Neck is supple no JVD no goiter no lymphadenopathy


Chest exam reveals a few scattered rhonchi no wheezing


Cardiac exam reveals regular heart sounds no gallops no murmurs


Abdomen is soft nontender no organomegaly


Extremity exam reveals no edema no cyanosis or clubbing


Neurological examination reveals no gross focal deficits





Results


CBC & Chem 7: 


                                 03/01/20 04:18





                                 03/01/20 04:18


Labs: 


                  Abnormal Lab Results - Last 24 Hours (Table)











  03/01/20 03/01/20 03/01/20 Range/Units





  04:18 04:18 04:18 


 


WBC  14.8 H    (3.8-10.6)  k/uL


 


RBC  3.95 L    (4.30-5.90)  m/uL


 


Hgb  10.7 L    (13.0-17.5)  gm/dL


 


Hct  33.0 L    (39.0-53.0)  %


 


Neutrophils #  13.4 H    (1.3-7.7)  k/uL


 


Lymphocytes #  0.4 L    (1.0-4.8)  k/uL


 


Sodium   136 L   (137-145)  mmol/L


 


Potassium   3.4 L   (3.5-5.1)  mmol/L


 


BUN   29 H   (9-20)  mg/dL


 


Creatinine   1.54 H   (0.66-1.25)  mg/dL


 


Glucose   115 H   (74-99)  mg/dL


 


POC Glucose (mg/dL)     (75-99)  mg/dL


 


Calcium   8.1 L   (8.4-10.2)  mg/dL


 


Alkaline Phosphatase   184 H   ()  U/L


 


Troponin I    0.862 H*  (0.000-0.034)  ng/mL


 


Total Protein   5.8 L   (6.3-8.2)  g/dL


 


Albumin   2.6 L   (3.5-5.0)  g/dL


 


Urine Protein     (Negative)  


 


Urine Blood     (Negative)  


 


Urine Bilirubin     (Negative)  


 


Ur Leukocyte Esterase     (Negative)  


 


Urine WBC     (0-5)  /hpf


 


Urine WBC Clumps     (None)  /hpf


 


Ur Squamous Epith Cells     (0-4)  /hpf


 


Urine Bacteria     (None)  /hpf


 


Hyaline Casts     (0-2)  /lpf


 


Urine Mucus     (None)  /hpf














  03/01/20 03/01/20 03/01/20 Range/Units





  05:39 07:43 10:38 


 


WBC     (3.8-10.6)  k/uL


 


RBC     (4.30-5.90)  m/uL


 


Hgb     (13.0-17.5)  gm/dL


 


Hct     (39.0-53.0)  %


 


Neutrophils #     (1.3-7.7)  k/uL


 


Lymphocytes #     (1.0-4.8)  k/uL


 


Sodium     (137-145)  mmol/L


 


Potassium     (3.5-5.1)  mmol/L


 


BUN     (9-20)  mg/dL


 


Creatinine     (0.66-1.25)  mg/dL


 


Glucose     (74-99)  mg/dL


 


POC Glucose (mg/dL)   151 H   (75-99)  mg/dL


 


Calcium     (8.4-10.2)  mg/dL


 


Alkaline Phosphatase     ()  U/L


 


Troponin I    1.620 H*  (0.000-0.034)  ng/mL


 


Total Protein     (6.3-8.2)  g/dL


 


Albumin     (3.5-5.0)  g/dL


 


Urine Protein  2+ H    (Negative)  


 


Urine Blood  Trace H    (Negative)  


 


Urine Bilirubin  1+ H    (Negative)  


 


Ur Leukocyte Esterase  Trace H    (Negative)  


 


Urine WBC  19 H    (0-5)  /hpf


 


Urine WBC Clumps  Few H    (None)  /hpf


 


Ur Squamous Epith Cells  6 H    (0-4)  /hpf


 


Urine Bacteria  Rare H    (None)  /hpf


 


Hyaline Casts  169 H    (0-2)  /lpf


 


Urine Mucus  Occasional H    (None)  /hpf














  03/01/20 Range/Units





  12:07 


 


WBC   (3.8-10.6)  k/uL


 


RBC   (4.30-5.90)  m/uL


 


Hgb   (13.0-17.5)  gm/dL


 


Hct   (39.0-53.0)  %


 


Neutrophils #   (1.3-7.7)  k/uL


 


Lymphocytes #   (1.0-4.8)  k/uL


 


Sodium   (137-145)  mmol/L


 


Potassium   (3.5-5.1)  mmol/L


 


BUN   (9-20)  mg/dL


 


Creatinine   (0.66-1.25)  mg/dL


 


Glucose   (74-99)  mg/dL


 


POC Glucose (mg/dL)  135 H  (75-99)  mg/dL


 


Calcium   (8.4-10.2)  mg/dL


 


Alkaline Phosphatase   ()  U/L


 


Troponin I   (0.000-0.034)  ng/mL


 


Total Protein   (6.3-8.2)  g/dL


 


Albumin   (3.5-5.0)  g/dL


 


Urine Protein   (Negative)  


 


Urine Blood   (Negative)  


 


Urine Bilirubin   (Negative)  


 


Ur Leukocyte Esterase   (Negative)  


 


Urine WBC   (0-5)  /hpf


 


Urine WBC Clumps   (None)  /hpf


 


Ur Squamous Epith Cells   (0-4)  /hpf


 


Urine Bacteria   (None)  /hpf


 


Hyaline Casts   (0-2)  /lpf


 


Urine Mucus   (None)  /hpf








                      Microbiology - Last 24 Hours (Table)











 03/01/20 05:39 Urine Culture - Preliminary





 Urine,Clean Catch 














Assessment and Plan


Plan: 





#1 non-ST elevation myocardial infarction


#2 evidence of dehydration was elevated BUN and creatinine


#3 underlying history of congestive heart failure


#4 evidence of urinary tract infection


#5 underlying history of chronic kidney disease


#6 underlying history of diabetes mellitus


#7 shortness of breath likely related to congestive heart failure and possibly 

related to ischemic heart disease, VQ scan was done and was negative





At this time patient is admitted to telemetry floor cardiology consultation was 

requested he is maintained on IV heparin and IV Lasix


Will follow closely

## 2020-03-02 LAB
ALBUMIN SERPL-MCNC: 2.3 G/DL (ref 3.5–5)
ALP SERPL-CCNC: 149 U/L (ref 38–126)
ALT SERPL-CCNC: 15 U/L (ref 4–49)
ANION GAP SERPL CALC-SCNC: 9 MMOL/L
AST SERPL-CCNC: 44 U/L (ref 17–59)
BASOPHILS # BLD AUTO: 0 K/UL (ref 0–0.2)
BASOPHILS NFR BLD AUTO: 0 %
BUN SERPL-SCNC: 33 MG/DL (ref 9–20)
CALCIUM SPEC-MCNC: 7.8 MG/DL (ref 8.4–10.2)
CHLORIDE SERPL-SCNC: 103 MMOL/L (ref 98–107)
CHOLEST SERPL-MCNC: 52 MG/DL (ref ?–200)
CO2 SERPL-SCNC: 25 MMOL/L (ref 22–30)
EOSINOPHIL # BLD AUTO: 0.2 K/UL (ref 0–0.7)
EOSINOPHIL NFR BLD AUTO: 4 %
ERYTHROCYTE [DISTWIDTH] IN BLOOD BY AUTOMATED COUNT: 3.53 M/UL (ref 4.3–5.9)
ERYTHROCYTE [DISTWIDTH] IN BLOOD: 15.2 % (ref 11.5–15.5)
GLUCOSE BLD-MCNC: 105 MG/DL (ref 75–99)
GLUCOSE BLD-MCNC: 110 MG/DL (ref 75–99)
GLUCOSE BLD-MCNC: 175 MG/DL (ref 75–99)
GLUCOSE BLD-MCNC: 181 MG/DL (ref 75–99)
GLUCOSE SERPL-MCNC: 97 MG/DL (ref 74–99)
HCT VFR BLD AUTO: 29.9 % (ref 39–53)
HDLC SERPL-MCNC: 13 MG/DL (ref 40–60)
HGB BLD-MCNC: 9.5 GM/DL (ref 13–17.5)
LDLC SERPL CALC-MCNC: 14 MG/DL (ref 0–99)
LYMPHOCYTES # SPEC AUTO: 0.5 K/UL (ref 1–4.8)
LYMPHOCYTES NFR SPEC AUTO: 9 %
MCH RBC QN AUTO: 27 PG (ref 25–35)
MCHC RBC AUTO-ENTMCNC: 31.8 G/DL (ref 31–37)
MCV RBC AUTO: 84.7 FL (ref 80–100)
MONOCYTES # BLD AUTO: 0.4 K/UL (ref 0–1)
MONOCYTES NFR BLD AUTO: 8 %
NEUTROPHILS # BLD AUTO: 4.1 K/UL (ref 1.3–7.7)
NEUTROPHILS NFR BLD AUTO: 78 %
PH UR: 5 [PH] (ref 5–8)
PLATELET # BLD AUTO: 111 K/UL (ref 150–450)
POTASSIUM SERPL-SCNC: 3.8 MMOL/L (ref 3.5–5.1)
PROT SERPL-MCNC: 5.4 G/DL (ref 6.3–8.2)
SODIUM SERPL-SCNC: 137 MMOL/L (ref 137–145)
SP GR UR: 1.01 (ref 1–1.03)
TRIGL SERPL-MCNC: 127 MG/DL (ref ?–150)
UROBILINOGEN UR QL STRIP: <2 MG/DL (ref ?–2)
WBC # BLD AUTO: 5.2 K/UL (ref 3.8–10.6)

## 2020-03-02 RX ADMIN — INSULIN DETEMIR SCH: 100 INJECTION, SOLUTION SUBCUTANEOUS at 06:03

## 2020-03-02 RX ADMIN — ISOSORBIDE MONONITRATE SCH MG: 30 TABLET, EXTENDED RELEASE ORAL at 09:01

## 2020-03-02 RX ADMIN — LISINOPRIL SCH MG: 20 TABLET ORAL at 09:01

## 2020-03-02 RX ADMIN — ASPIRIN 81 MG CHEWABLE TABLET SCH MG: 81 TABLET CHEWABLE at 09:01

## 2020-03-02 RX ADMIN — CARVEDILOL SCH MG: 6.25 TABLET, FILM COATED ORAL at 20:59

## 2020-03-02 RX ADMIN — IPRATROPIUM BROMIDE AND ALBUTEROL SULFATE SCH ML: .5; 3 SOLUTION RESPIRATORY (INHALATION) at 19:26

## 2020-03-02 RX ADMIN — IPRATROPIUM BROMIDE AND ALBUTEROL SULFATE SCH ML: .5; 3 SOLUTION RESPIRATORY (INHALATION) at 15:59

## 2020-03-02 RX ADMIN — HEPARIN SODIUM SCH: 10000 INJECTION, SOLUTION INTRAVENOUS at 05:57

## 2020-03-02 RX ADMIN — Medication SCH MG: at 09:00

## 2020-03-02 RX ADMIN — HEPARIN SODIUM SCH MLS/HR: 10000 INJECTION, SOLUTION INTRAVENOUS at 09:01

## 2020-03-02 RX ADMIN — CARVEDILOL SCH MG: 6.25 TABLET, FILM COATED ORAL at 09:01

## 2020-03-02 RX ADMIN — ATORVASTATIN CALCIUM SCH MG: 80 TABLET, FILM COATED ORAL at 09:01

## 2020-03-02 RX ADMIN — SPIRONOLACTONE SCH MG: 25 TABLET, FILM COATED ORAL at 09:00

## 2020-03-02 RX ADMIN — TAMSULOSIN HYDROCHLORIDE SCH MG: 0.4 CAPSULE ORAL at 20:59

## 2020-03-02 RX ADMIN — IPRATROPIUM BROMIDE AND ALBUTEROL SULFATE SCH ML: .5; 3 SOLUTION RESPIRATORY (INHALATION) at 11:31

## 2020-03-02 RX ADMIN — FUROSEMIDE SCH MG: 10 INJECTION, SOLUTION INTRAMUSCULAR; INTRAVENOUS at 09:01

## 2020-03-02 NOTE — P.PN
Subjective


Progress Note Date: 03/02/20


His is a 74-year-old gentleman with past medical history of diabetes, I 

pretension, hyperlipidemia, prior TIA, obesity, current preventative the 

hospital with mental status changes, dizziness, diaphoresis and shortness of 

breath.  He is currently receiving treatment for congestive cardiac failure.  

Patient was also noted to have abnormality in his troponins suggestive of a 

possible non-Q-wave myocardial infarction.  The V/Q scan was negative for 

pulmonary embolism.  Blood pressure 132/60 with a heart rate in the 70s, 92% on 

room air.  White blood cell count 5.2, hemoglobin 9.5, platelet count 110.  C

reatinine 1.3 today.  Echocardiogram with Doppler study revealed an ejection 

fraction of 45-50%.








Objective





- Vital Signs


Vital signs: 


                                   Vital Signs











Temp  97.7 F   03/02/20 11:36


 


Pulse  70   03/02/20 11:43


 


Resp  16   03/02/20 11:38


 


BP  133/61   03/02/20 11:36


 


Pulse Ox  92 L  03/02/20 11:36








                                 Intake & Output











 03/01/20 03/02/20 03/02/20





 18:59 06:59 18:59


 


Intake Total 793.032 426.968 549.039


 


Output Total 600  400


 


Balance 193.032 426.968 149.039


 


Weight 103.3 kg 103.6 kg 


 


Intake:   


 


  IV 30  20


 


    Invasive Line 1 10  


 


    Invasive Line 2 20  20


 


  Intake, IV Titration 63.032 186.968 49.039





  Amount   


 


    Heparin Sod,Pork in 0.45% 63.032 186.968 49.039





    NaCl 25,000 unit In 0.45   





    % NaCl 1 250ml.bag @ 9.76   





    UNITS/KG/HR 10.005 mls/   





    hr IV .Q24H [facility] Rx#:   





    [id number]   


 


  Oral 700 240 480


 


Output:   


 


  Urine 600  400


 


Other:   


 


  Voiding Method Urinal Urinal Urinal





  Diaper Diaper


 


  # Voids 1 1 














- Exam





PHYSICAL EXAMINATION: This is a 74-year-old male in no apparent distress at the 

time of my examination.


HEENT: Head is atraumatic, normocephalic. Pupils are equal, round. Sclerae 

anicteric. Conjunctivae are clear. Mucous membranes of the mouth are moist. Neck

is supple. There is no jugular venous distention. No carotid bruit is heard. 


CHEST EXAMINATION: Lungs are diminished bilaterally.  No chest wall tenderness 

is noted on palpation or with deep breathing. 


HEART EXAMINATION: Heart regular rate and rhythm. S1, S2 heard. No murmurs, 

gallops or rub.


ABDOMEN: Soft, nontender. Bowel sounds are heard. No organomegaly noted. 


EXTREMITIES: 2+ peripheral pulses.  Mild lower extremity edema, worse on the 

left. no calf tenderness noted. 


NEUROLOGIC EXAMINATION: Patient is awake, alert and oriented x3. 








- Labs


CBC & Chem 7: 


                                 03/02/20 05:46





                                 03/02/20 05:46


Labs: 


                  Abnormal Lab Results - Last 24 Hours (Table)











  03/01/20 03/01/20 03/01/20 Range/Units





  15:48 16:59 19:45 


 


RBC     (4.30-5.90)  m/uL


 


Hgb     (13.0-17.5)  gm/dL


 


Hct     (39.0-53.0)  %


 


Plt Count     (150-450)  k/uL


 


Lymphocytes #     (1.0-4.8)  k/uL


 


APTT    31.3 H  (22.0-30.0)  sec


 


BUN     (9-20)  mg/dL


 


Creatinine     (0.66-1.25)  mg/dL


 


POC Glucose (mg/dL)   119 H   (75-99)  mg/dL


 


Calcium     (8.4-10.2)  mg/dL


 


Alkaline Phosphatase     ()  U/L


 


Troponin I  1.140 H*    (0.000-0.034)  ng/mL


 


Total Protein     (6.3-8.2)  g/dL


 


Albumin     (3.5-5.0)  g/dL


 


HDL Cholesterol     (40-60)  mg/dL














  03/01/20 03/02/20 03/02/20 Range/Units





  20:25 05:46 05:46 


 


RBC   3.53 L   (4.30-5.90)  m/uL


 


Hgb   9.5 L   (13.0-17.5)  gm/dL


 


Hct   29.9 L   (39.0-53.0)  %


 


Plt Count   111 L   (150-450)  k/uL


 


Lymphocytes #   0.5 L   (1.0-4.8)  k/uL


 


APTT     (22.0-30.0)  sec


 


BUN    33 H  (9-20)  mg/dL


 


Creatinine    1.32 H  (0.66-1.25)  mg/dL


 


POC Glucose (mg/dL)  106 H    (75-99)  mg/dL


 


Calcium    7.8 L  (8.4-10.2)  mg/dL


 


Alkaline Phosphatase    149 H  ()  U/L


 


Troponin I     (0.000-0.034)  ng/mL


 


Total Protein    5.4 L  (6.3-8.2)  g/dL


 


Albumin    2.3 L  (3.5-5.0)  g/dL


 


HDL Cholesterol    13 L  (40-60)  mg/dL














  03/02/20 03/02/20 03/02/20 Range/Units





  05:56 10:29 12:06 


 


RBC     (4.30-5.90)  m/uL


 


Hgb     (13.0-17.5)  gm/dL


 


Hct     (39.0-53.0)  %


 


Plt Count     (150-450)  k/uL


 


Lymphocytes #     (1.0-4.8)  k/uL


 


APTT   32.8 H   (22.0-30.0)  sec


 


BUN     (9-20)  mg/dL


 


Creatinine     (0.66-1.25)  mg/dL


 


POC Glucose (mg/dL)  105 H   110 H  (75-99)  mg/dL


 


Calcium     (8.4-10.2)  mg/dL


 


Alkaline Phosphatase     ()  U/L


 


Troponin I     (0.000-0.034)  ng/mL


 


Total Protein     (6.3-8.2)  g/dL


 


Albumin     (3.5-5.0)  g/dL


 


HDL Cholesterol     (40-60)  mg/dL








                      Microbiology - Last 24 Hours (Table)











 03/01/20 05:39 Urine Culture - Final





 Urine,Clean Catch 


 


 03/01/20 04:45 Blood Culture - Preliminary





 Blood    No Growth after 24 hours














Assessment and Plan


Plan: 





FINAL ASSESSMENT AND PLAN: 


#1 non-ST elevated myocardial infarction


#2 congestive heart failure diastolic acute on chronic


#3 hypertension


#4 worsening shortness of breath, VQ scan negative


#5 diabetes mellitus


#6 chronic kidney disease, stage III


#7 hyperlipidemia








Plan


We will continue current dose of IV Lasix.  Continue to monitor the intake and 

output along with daily weights and daily lytes BUN and creatinine.





DNP note has been reviewed, I agree with a documented findings and plan of care.

 Patient was seen and examined.

## 2020-03-02 NOTE — XR
EXAMINATION TYPE: XR chest 2V

 

DATE OF EXAM: 3/2/2020

 

COMPARISON: March 1, 2020

 

HISTORY: Short of breath

 

TECHNIQUE: 2 views

 

FINDINGS: Heart is normal. Lungs are clear of consolidation. Thoracic aorta is atheromatous. There ar
e chest leads. Costophrenic angles are clear. The bony thorax is intact.

 

IMPRESSION: No active cardiopulmonary disease. No change.

## 2020-03-02 NOTE — ECHOF
Referral Reason:chest pain



MEASUREMENTS

--------

HEIGHT: 175.3 cm

WEIGHT: 103.0 kg

BP: 126/62

RVIDd:   3.0 cm     (< 3.3)

IVSd:   1.6 cm     (0.6 - 1.1)

LVIDd:   5.3 cm     (3.9 - 5.3)

LVPWd:   1.1 cm     (0.6 - 1.1)

IVSs:   2.3 cm

LVIDs:   4.3 cm

LVPWs:   1.5 cm

LAESV Index (A-L):   25.99 ml/m

Ao Diam:   2.6 cm     (2.0 - 3.7)

AV Cusp:   1.6 cm     (1.5 - 2.6)

LA Diam:   4.0 cm     (2.7 - 3.8)

MV EXCURSION:   11.800 mm     (> 18.000)

MV EF SLOPE:   58 mm/s     (70 - 150)

EPSS:   0.9 cm

MV E Manuel:   0.93 m/s

MV DecT:   186 ms

MV A Manuel:   0.93 m/s

MV E/A Ratio:   1.01 

AV maxP.82 mmHg

AV meanP.54 mmHg

RAP:   5.00 mmHg

RVSP:   13.76 mmHg

TAPSE:   29.67 mm







FINDINGS

--------

Sinus rhythm.

This was a technically adequate study.

The left ventricular size is normal.   There is mild concentric left ventricular hypertrophy.   Overa
ll left ventricular systolic function is mildly impaired with, an EF between 45 - 50 %. Mid  Normal L
AP Grade 1 Diastolic Dysfunction.   Basal inferior LV wall motion is hypokinetic.    Inferiorlateral 
Hypokinesis

The right ventricle is normal in size.   The right ventricular systolic function is normal.

The left atrial size is normal.    Normal LA  size by volume 22+/-6 ml/m2.

The right atrial size is normal.

Aortic valve is trileaflet and is mildly thickened.   There is mild aortic stenosis present.   Peak/m
zion gradient across the Aortic Valve is 18.82mmHg / 11.54mmHg.

The mitral valve is normal.   The mitral valve leaflets are mildly thickened.   Mild mitral annular c
alcification present.   Mild mitral regurgitation is present.

The tricuspid valve appears structurally normal.   Mild tricuspid regurgitation present.   Right vent
ricular systolic pressure is normal at < 35 mmHg.

There is no pulmonic regurgitation present.

The aortic root size is normal.

Normal inferior vena cava with normal inspiratory collapse consistent with estimated right atrial pre
ssure of  5 mmHg.

There is no pericardial effusion.



CONCLUSIONS

--------

1. Sinus rhythm.

2. This was a technically adequate study.

3. The left ventricular size is normal.

4. There is mild concentric left ventricular hypertrophy.

5. Overall left ventricular systolic function is mildly impaired with, an EF between 45 - 50 %.

6. Normal LAP Grade 1 Diastolic Dysfunction.

7. Basal inferior LV wall motion is hypokinetic.

8. Mid Inferiorlateral Hypokinesis

9. The right ventricle is normal in size.

10. The right ventricular systolic function is normal.

11. The left atrial size is normal.

12. Normal LA size by volume 22+/-6 ml/m2.

13. The right atrial size is normal.

14. Aortic valve is trileaflet and is mildly thickened.

15. There is mild aortic stenosis present.

16. Peak/mean gradient across the Aortic Valve is 18.82mmHg / 11.54mmHg.

17. The mitral valve is normal.

18. The mitral valve leaflets are mildly thickened.

19. Mild mitral annular calcification present.

20. Mild mitral regurgitation is present.

21. The tricuspid valve appears structurally normal.

22. Mild tricuspid regurgitation present.

23. Right ventricular systolic pressure is normal at < 35 mmHg.

24. There is no pulmonic regurgitation present.

25. The aortic root size is normal.

26. Normal inferior vena cava with normal inspiratory collapse consistent with estimated right atrial
 pressure of  5 mmHg.

27. There is no pericardial effusion.





SONOGRAPHER: Yaima Navarrete RDCS

## 2020-03-02 NOTE — P.PN
Subjective


Progress Note Date: 03/02/20





Patient is a 74-year-old male who presented to [facility] 

emergency room via EMS with multiple vague symptoms including confusion and 

mental status changes, episodes of shortness of breath he was evaluated in the 

emergency room, he had evidence of urinary tract infection and evidence of e

levated troponin level, he also had evidence of dehydration was elevated BUN and

creatinine he was admitted to telemetry floor for further evaluation and 

cardiology consultation was requested.  Patient was seen and examined on the 

telemetry floor he is alert and oriented 3 in no apparent distress, he denies 

any chest pain or shortness of breath there is no fever or chills no headache or

dizziness no nausea or vomiting no abdominal pain no diarrhea no burning was 

urination no frequency or urgency and no hematuria








On 03/02/2020 patient is alert and oriented 3.  2-D echo completed awaiting to 

be red.  Patient remains on IV Lasix and IV heparin.  Cardiology services are 

following.  Repeat chest x-ray has been ordered due to wheezing is auscultation.

 DuoNeb breathing treatments ordered creatinine is improving at 1.32 and bun 33.

 At this time patient denies chest pain.  Patient is any nausea vomiting or 

diarrhea.  Patient denies any urinary burning or frequency





Objective





- Vital Signs


Vital signs: 


                                   Vital Signs











Temp  98.1 F   03/02/20 08:02


 


Pulse  68   03/02/20 08:02


 


Resp  16   03/02/20 08:02


 


BP  129/69   03/02/20 08:02


 


Pulse Ox  94 L  03/02/20 08:03








                                 Intake & Output











 03/01/20 03/02/20 03/02/20





 18:59 06:59 18:59


 


Intake Total 793.032 426.968 250


 


Output Total 600  


 


Balance 193.032 426.968 250


 


Weight 103.3 kg 103.6 kg 


 


Intake:   


 


  IV 30  10


 


    Invasive Line 1 10  


 


    Invasive Line 2 20  10


 


  Intake, IV Titration 63.032 186.968 





  Amount   


 


    Heparin Sod,Pork in 0.45% 63.032 186.968 





    NaCl 25,000 unit In 0.45   





    % NaCl 1 250ml.bag @ 9.76   





    UNITS/KG/HR 10.005 mls/   





    hr IV .Q24H [facility] Rx#:   





    [id number]   


 


  Oral 700 240 240


 


Output:   


 


  Urine 600  


 


Other:   


 


  Voiding Method Urinal Urinal Urinal





  Diaper Diaper


 


  # Voids 1 1 














- Exam





In general patient is alert and oriented 3 in no apparent distress


HEENT head normocephalic and atraumatic


Neck is supple no JVD no goiter no lymphadenopathy


Chest exam reveals a few scattered rhonchi no wheezing


Cardiac exam reveals regular heart sounds no gallops no murmurs


Abdomen is soft nontender no organomegaly


Extremity exam reveals no edema no cyanosis or clubbing


Neurological examination reveals no gross focal deficits





- Labs


CBC & Chem 7: 


                                 03/02/20 05:46





                                 03/02/20 05:46


Labs: 


                  Abnormal Lab Results - Last 24 Hours (Table)











  03/01/20 03/01/20 03/01/20 Range/Units





  10:38 12:07 14:35 


 


RBC     (4.30-5.90)  m/uL


 


Hgb     (13.0-17.5)  gm/dL


 


Hct     (39.0-53.0)  %


 


Plt Count     (150-450)  k/uL


 


Lymphocytes #     (1.0-4.8)  k/uL


 


APTT     (22.0-30.0)  sec


 


BUN     (9-20)  mg/dL


 


Creatinine     (0.66-1.25)  mg/dL


 


POC Glucose (mg/dL)   135 H  181 H  (75-99)  mg/dL


 


Calcium     (8.4-10.2)  mg/dL


 


Alkaline Phosphatase     ()  U/L


 


Troponin I  1.620 H*    (0.000-0.034)  ng/mL


 


Total Protein     (6.3-8.2)  g/dL


 


Albumin     (3.5-5.0)  g/dL


 


HDL Cholesterol     (40-60)  mg/dL














  03/01/20 03/01/20 03/01/20 Range/Units





  15:48 16:59 19:45 


 


RBC     (4.30-5.90)  m/uL


 


Hgb     (13.0-17.5)  gm/dL


 


Hct     (39.0-53.0)  %


 


Plt Count     (150-450)  k/uL


 


Lymphocytes #     (1.0-4.8)  k/uL


 


APTT    31.3 H  (22.0-30.0)  sec


 


BUN     (9-20)  mg/dL


 


Creatinine     (0.66-1.25)  mg/dL


 


POC Glucose (mg/dL)   119 H   (75-99)  mg/dL


 


Calcium     (8.4-10.2)  mg/dL


 


Alkaline Phosphatase     ()  U/L


 


Troponin I  1.140 H*    (0.000-0.034)  ng/mL


 


Total Protein     (6.3-8.2)  g/dL


 


Albumin     (3.5-5.0)  g/dL


 


HDL Cholesterol     (40-60)  mg/dL














  03/01/20 03/02/20 03/02/20 Range/Units





  20:25 05:46 05:46 


 


RBC   3.53 L   (4.30-5.90)  m/uL


 


Hgb   9.5 L   (13.0-17.5)  gm/dL


 


Hct   29.9 L   (39.0-53.0)  %


 


Plt Count   111 L   (150-450)  k/uL


 


Lymphocytes #   0.5 L   (1.0-4.8)  k/uL


 


APTT     (22.0-30.0)  sec


 


BUN    33 H  (9-20)  mg/dL


 


Creatinine    1.32 H  (0.66-1.25)  mg/dL


 


POC Glucose (mg/dL)  106 H    (75-99)  mg/dL


 


Calcium    7.8 L  (8.4-10.2)  mg/dL


 


Alkaline Phosphatase    149 H  ()  U/L


 


Troponin I     (0.000-0.034)  ng/mL


 


Total Protein    5.4 L  (6.3-8.2)  g/dL


 


Albumin    2.3 L  (3.5-5.0)  g/dL


 


HDL Cholesterol    13 L  (40-60)  mg/dL














  03/02/20 Range/Units





  05:56 


 


RBC   (4.30-5.90)  m/uL


 


Hgb   (13.0-17.5)  gm/dL


 


Hct   (39.0-53.0)  %


 


Plt Count   (150-450)  k/uL


 


Lymphocytes #   (1.0-4.8)  k/uL


 


APTT   (22.0-30.0)  sec


 


BUN   (9-20)  mg/dL


 


Creatinine   (0.66-1.25)  mg/dL


 


POC Glucose (mg/dL)  105 H  (75-99)  mg/dL


 


Calcium   (8.4-10.2)  mg/dL


 


Alkaline Phosphatase   ()  U/L


 


Troponin I   (0.000-0.034)  ng/mL


 


Total Protein   (6.3-8.2)  g/dL


 


Albumin   (3.5-5.0)  g/dL


 


HDL Cholesterol   (40-60)  mg/dL








                      Microbiology - Last 24 Hours (Table)











 03/01/20 04:45 Blood Culture - Preliminary





 Blood    No Growth after 24 hours


 


 03/01/20 05:39 Urine Culture - Preliminary





 Urine,Clean Catch 














Assessment and Plan


Assessment: 


1. shortness of breath likely related to congestive heart failure and possibly 

related to ischemic heart disease, VQ scan was done and was negative. 





2.  non-ST elevation myocardial infarction.  Patient remains on IV heparin and 

cardiology services are following





3.  evidence of dehydration was elevated BUN and creatinine.  Creatinine 

improving to 1.3 to and bun 33.  This does appear baseline for patient





4.  underlying history of congestive heart failure.  2-D echo has been ordered 

per cardiology.  BNP 10,600. Patient currently on IV Lasix





5.  evidence of urinary tract infection.  Patient asymptomatic.  Urine culture 

ordered.  Will repeat urinary analysis for a.m.





6.  underlying history of chronic kidney disease stage II





7.  underlying history of diabetes mellitus type II.  Home meds resumed





8.  Increased wheeziness.  Chest x-ray and DuoNeb breathing





DVT prophylaxis IV heparin.  GI prophylaxis Pepcid


2-D echo ordered


Patient remains on IV heparin and IV Lasix


Cardiology service consulted


Chest x-ray and repeat UA ordered











I performed an examination of the patient and discussed their management with 

the Nurse Practitioner.  I have reviewed the Nurse Practitioner's notes and 

agree with the documented findings and plan of care

## 2020-03-03 LAB
ANION GAP SERPL CALC-SCNC: 5 MMOL/L
BUN SERPL-SCNC: 28 MG/DL (ref 9–20)
CALCIUM SPEC-MCNC: 7.9 MG/DL (ref 8.4–10.2)
CHLORIDE SERPL-SCNC: 104 MMOL/L (ref 98–107)
CO2 SERPL-SCNC: 27 MMOL/L (ref 22–30)
GLUCOSE BLD-MCNC: 129 MG/DL (ref 75–99)
GLUCOSE BLD-MCNC: 204 MG/DL (ref 75–99)
GLUCOSE BLD-MCNC: 262 MG/DL (ref 75–99)
GLUCOSE BLD-MCNC: 93 MG/DL (ref 75–99)
GLUCOSE SERPL-MCNC: 133 MG/DL (ref 74–99)
PLATELET # BLD AUTO: 109 K/UL (ref 150–450)
POTASSIUM SERPL-SCNC: 4.1 MMOL/L (ref 3.5–5.1)
SODIUM SERPL-SCNC: 136 MMOL/L (ref 137–145)

## 2020-03-03 RX ADMIN — CARVEDILOL SCH MG: 6.25 TABLET, FILM COATED ORAL at 20:26

## 2020-03-03 RX ADMIN — FUROSEMIDE SCH MG: 10 INJECTION, SOLUTION INTRAMUSCULAR; INTRAVENOUS at 08:50

## 2020-03-03 RX ADMIN — ASPIRIN 81 MG CHEWABLE TABLET SCH MG: 81 TABLET CHEWABLE at 08:50

## 2020-03-03 RX ADMIN — IPRATROPIUM BROMIDE AND ALBUTEROL SULFATE SCH ML: .5; 3 SOLUTION RESPIRATORY (INHALATION) at 20:34

## 2020-03-03 RX ADMIN — Medication SCH MG: at 08:49

## 2020-03-03 RX ADMIN — HEPARIN SODIUM SCH MLS/HR: 10000 INJECTION, SOLUTION INTRAVENOUS at 07:14

## 2020-03-03 RX ADMIN — IPRATROPIUM BROMIDE AND ALBUTEROL SULFATE SCH ML: .5; 3 SOLUTION RESPIRATORY (INHALATION) at 07:20

## 2020-03-03 RX ADMIN — TAMSULOSIN HYDROCHLORIDE SCH MG: 0.4 CAPSULE ORAL at 20:26

## 2020-03-03 RX ADMIN — HEPARIN SODIUM SCH MLS/HR: 10000 INJECTION, SOLUTION INTRAVENOUS at 08:56

## 2020-03-03 RX ADMIN — CARVEDILOL SCH MG: 6.25 TABLET, FILM COATED ORAL at 08:50

## 2020-03-03 RX ADMIN — IOPAMIDOL PRN ML: 612 INJECTION, SOLUTION INTRAVENOUS at 18:24

## 2020-03-03 RX ADMIN — INSULIN DETEMIR SCH UNIT: 100 INJECTION, SOLUTION SUBCUTANEOUS at 07:12

## 2020-03-03 RX ADMIN — SPIRONOLACTONE SCH MG: 25 TABLET, FILM COATED ORAL at 08:50

## 2020-03-03 RX ADMIN — LISINOPRIL SCH MG: 20 TABLET ORAL at 08:50

## 2020-03-03 RX ADMIN — IPRATROPIUM BROMIDE AND ALBUTEROL SULFATE SCH ML: .5; 3 SOLUTION RESPIRATORY (INHALATION) at 11:28

## 2020-03-03 RX ADMIN — FAMOTIDINE SCH MG: 20 TABLET, FILM COATED ORAL at 08:50

## 2020-03-03 RX ADMIN — IPRATROPIUM BROMIDE AND ALBUTEROL SULFATE SCH ML: .5; 3 SOLUTION RESPIRATORY (INHALATION) at 15:59

## 2020-03-03 RX ADMIN — ISOSORBIDE MONONITRATE SCH MG: 30 TABLET, EXTENDED RELEASE ORAL at 08:50

## 2020-03-03 RX ADMIN — ATORVASTATIN CALCIUM SCH MG: 80 TABLET, FILM COATED ORAL at 08:50

## 2020-03-03 RX ADMIN — HEPARIN SODIUM SCH MLS/HR: 10000 INJECTION, SOLUTION INTRAVENOUS at 17:51

## 2020-03-03 RX ADMIN — IOPAMIDOL PRN ML: 612 INJECTION, SOLUTION INTRAVENOUS at 18:29

## 2020-03-03 RX ADMIN — INSULIN ASPART SCH UNIT: 100 INJECTION, SOLUTION INTRAVENOUS; SUBCUTANEOUS at 20:26

## 2020-03-03 NOTE — P.CONS
History of Present Illness





- Reason for Consult


Consult date: 03/03/20


Bacteremia


Requesting physician: Marlin Tafoya





- Chief Complaint


Shortness of breath x 2 days





- History of Present Illness


Patient is 74-year-old male presenting to the ER at [facility] 

on 03/01/2020 for evaluation of acute change in his clinical condition a

pparently the patient was noticed by his son to be half of the bed position 

though he hasn't fallen down he was noticed to be incontinence of bowel and 

bladder which has previously happened when his sugar has been low and she did 

have blood sugar check at that time was in 150s clean his father and bring back 

to the bed he was noted to be sweaty and breathing hard patient mentioning his 

breathing has been getting worse for 2 days before he presented to the hospital,

but denies having any chest pain or cough and no sputum production denies having

any URI symptoms no nausea no vomiting and no abdominal pain no diarrhea or any 

constipation, on presentation hospital.  Did have low-grade fever of 99.8 and he

did have elevated white count of 14.8 and subsequently normalized, the patient 

did have mildly positive UA however repeat the next day has been negative, 

patient influenza PCR was negative chest x-ray was negative for active cardio 

pulmonary disease and echocardiogram was done which was mildly impaired ejection

fraction, the patient did have blood cultures drawn on admission which is now 

documented positive gram-positive bacilli that probably this infection disease 

consultation, patient currently denies having active skin sore or any swelling 

or redness and denies having any joint swelling no nausea no vomiting no 

diarrhea no urinary symptoms








Review of Systems


Positive point has been  mentioned in the HPI rest of the systems are negative








Past Medical History


Past Medical History: Chest Pain / Angina, Heart Failure, CVA/TIA, Diabetes 

Mellitus, Hyperlipidemia, Hypertension, Osteoarthritis (OA)


Additional Past Medical History / Comment(s): incontinent of stools 

intermittently. low iron levels, possible GI bleed-dark tarry stools per son, 

Son stated "has had recent falls related to blood sugar going low 60s"-HX 2017 

and 2018 had episodes of CHF approx 7-10 days post receiving flu 

vaccine,TIA,Type2 IDDM


History of Any Multi-Drug Resistant Organisms: None Reported


Past Surgical History: Hernia Repair


Additional Past Surgical History / Comment(s): 9/11/18 robot assisted 

laprascopic umbilical hernia repair with mesh., 6/10/19 repair recurrent 

incarerated hernia


Past Anesthesia/Blood Transfusion Reactions: No Reported Reaction


Additional Past Anesthesia/Blood Transfusion Reaction / Comm: never had 

anesthesia


Past Psychological History: Depression


Smoking Status: Current every day smoker


Past Alcohol Use History: None Reported


Past Drug Use History: None Reported





- Past Family History


  ** Mother


Family Medical History: Cancer


Additional Family Medical History / Comment(s): breast cancer





  ** Father


Family Medical History: Unable to Obtain





  ** Sister(s)


Family Medical History: Cancer





  ** Brother(s)


Family Medical History: Diabetes Mellitus





Medications and Allergies


                                Home Medications











 Medication  Instructions  Recorded  Confirmed  Type


 


Benazepril HCl 40 mg PO QAM 12/03/17 03/01/20 History


 


Aspirin 81 mg PO DAILY 12/04/17 03/01/20 History


 


Tamsulosin [Flomax] 0.4 mg PO HS 12/04/17 03/01/20 History


 


Carvedilol [Coreg] 6.25 mg PO BID 09/05/18 03/01/20 History


 


Isosorbide Mononitrate ER [Imdur] 30 mg PO QAM 09/05/18 03/01/20 History


 


Lovastatin [Mevacor] 20 mg PO HS 09/05/18 03/01/20 History


 


Spironolactone [Aldactone] 12.5 mg PO QAM 09/05/18 03/01/20 History


 


Ferrous Sulfate [Iron (65  mg PO DAILY #30 tab 12/03/18 03/01/20 Rx





Elemental)]    


 


Furosemide [Lasix] 20 mg PO DAILY 12/26/19 03/01/20 History


 


Insulin Glargine,Hum.rec.anlog 40 unit SQ [facility] 01/08/20 03/01/20 History





[Basaglar Kwikpen U-100]    








                                    Allergies











Allergy/AdvReac Type Severity Reaction Status Date / Time


 


No Known Allergies Allergy   Verified 03/01/20 11:12














Physical Exam


Vitals: 


                                   Vital Signs











  Temp Pulse Pulse Resp BP Pulse Ox


 


 03/03/20 12:00     16  


 


 03/03/20 11:47  98.2 F   66  16  144/68  96


 


 03/03/20 11:38   78    


 


 03/03/20 11:28   78    


 


 03/03/20 08:00     16  


 


 03/03/20 07:59  97.6 F   71  16  158/71  96


 


 03/03/20 07:32   70    


 


 03/03/20 07:22   68     91 L


 


 03/03/20 04:05  97.9 F   78  16  148/65  92 L


 


 03/02/20 23:53  97.4 F L   69  18  162/72  97


 


 03/02/20 20:00  97 F L   65  16  177/71  96


 


 03/02/20 19:38   74    


 


 03/02/20 19:26   72    


 


 03/02/20 16:12   70    


 


 03/02/20 16:00  97.5 F L   68  16  132/59  95


 


 03/02/20 15:59   68     97








                                Intake and Output











 03/03/20 03/03/20 03/03/20





 06:59 14:59 22:59


 


Intake Total 250 602.368 


 


Output Total 825 300 


 


Balance -575 302.368 


 


Intake:   


 


  Intake, IV Titration  242.368 





  Amount   


 


    Heparin Sod,Pork in 0.45%  242.368 





    NaCl 25,000 unit In 0.45   





    % NaCl 1 250ml.bag @ 9.76   





    UNITS/KG/HR 10.005 mls/   





    hr IV .Q24H [facility] Rx#:   





    [id number]   


 


  Oral 250 360 


 


Output:   


 


  Urine 825 300 


 


Other:   


 


  Voiding Method Urinal Urinal 


 


  # Voids  1 


 


  # Bowel Movements  1 


 


  Weight 103.2 kg  











GENERAL DESCRIPTION: Elderly male up in bed, no distress. No tachypnea or 

accessory muscle of respiration use.


HEENT: Shows Pallor , no scleral icterus. Oral mucous membrane is dry. No phar

yngeal erythema or thrush


NECK: Trachea central, no thyromegaly.


LUNGS: Unlabored breathing.  Decreased breath sound the bases. No wheeze or 

crackle.


HEART: S1, S2, regular rate and rhythm. No loud murmur


ABDOMEN: Soft, mild left-sided tenderness , no guarding or rigidity, no 

organomegaly


EXTREMITIES: No edema of feet.


SKIN: No rash, no masses palpable.


NEUROLOGICAL: The patient is awake, alert, oriented x3, mood and affect normal.

















Results


CBC & Chem 7: 


                                 03/03/20 06:42





                                 03/03/20 06:42


Labs: 


                  Abnormal Lab Results - Last 24 Hours (Table)











  03/02/20 03/02/20 03/02/20 Range/Units





  16:42 18:05 21:05 


 


Plt Count     (150-450)  k/uL


 


APTT   53.7 H   (22.0-30.0)  sec


 


Sodium     (137-145)  mmol/L


 


BUN     (9-20)  mg/dL


 


Glucose     (74-99)  mg/dL


 


POC Glucose (mg/dL)  181 H   175 H  (75-99)  mg/dL


 


Calcium     (8.4-10.2)  mg/dL














  03/03/20 03/03/20 03/03/20 Range/Units





  06:42 06:42 06:42 


 


Plt Count  109 L    (150-450)  k/uL


 


APTT    38.0 H  (22.0-30.0)  sec


 


Sodium   136 L   (137-145)  mmol/L


 


BUN   28 H   (9-20)  mg/dL


 


Glucose   133 H   (74-99)  mg/dL


 


POC Glucose (mg/dL)     (75-99)  mg/dL


 


Calcium   7.9 L   (8.4-10.2)  mg/dL














  03/03/20 Range/Units





  07:00 


 


Plt Count   (150-450)  k/uL


 


APTT   (22.0-30.0)  sec


 


Sodium   (137-145)  mmol/L


 


BUN   (9-20)  mg/dL


 


Glucose   (74-99)  mg/dL


 


POC Glucose (mg/dL)  129 H  (75-99)  mg/dL


 


Calcium   (8.4-10.2)  mg/dL








                      Microbiology - Last 24 Hours (Table)











 03/01/20 04:45 Blood Culture Gram Stain - Preliminary





 Blood 


 


 03/01/20 04:45 Blood Culture - Final





 Blood 


 


 03/01/20 05:39 Urine Culture - Final





 Urine,Clean Catch 














Assessment and Plan


Assessment: 


1-patient with positive blood culture with gram-positive bacilli which are 

usually skin contamination however we'll wait for the final ID of this pathogen 

blood cultures will be repeated to document clearance of bacteremia


2-patient was sweaty did have low-grade fever and elevated white count and pr

esentation and also noticed to have mild tenderness left abdominal side will 

need to rule out any intra-abdominal pathology





(1) Bacteremia


Current Visit: Yes   Status: Acute   Code(s): R78.81 - BACTEREMIA   SNOMED 

Code(s): 2165844


   


Plan: 


1-blood culture repeat times one to document clearance of bacteremia


2-we will obtain CT of abdominal pelvis with oral contrast only to rule out any 

abdominal pathology


We will follow on clinical condition and cultures to further adjust medication 

if needed


Thank you for this consultation will follow this patient with you





Time with Patient: Greater than 30

## 2020-03-03 NOTE — P.PN
Subjective


Progress Note Date: 03/03/20


This is a 74-year-old gentleman with past medical history of diabetes, 

hypertension, hyperlipidemia, prior TIA, obesity, current preventative the 

hospital with mental status changes, dizziness, diaphoresis and shortness of 

breath.  He is currently receiving treatment for congestive cardiac failure.  

Patient was also noted to have abnormality in his troponins suggestive of a 

possible non-Q-wave myocardial infarction.  The V/Q scan was negative for 

pulmonary embolism.  Blood pressure 132/60 with a heart rate in the 70s, 92% on 

room air.  White blood cell count 5.2, hemoglobin 9.5, platelet count 110.  

Creatinine 1.3 today.  Echocardiogram with Doppler study revealed an ejection 

fraction of 45-50%.





03/03/2020


Patient seen and examined this morning, breathing is significantly improved.  

Diuresing well on IV Lasix.  Blood pressure 144/68 with a heart rate in the 60s,

96% on room air.  Sodium 136, potassium 4.1, BUN 28, creatinine 1.2.  Continues 

to be on IV Lasix 40 mg daily, we will continue this for another 24 hours.  

Repeat chest x-ray did not reveal any active cardiopulmonary disease.  Patient 

continues to have trace to 1+ bilateral peripheral edema.











Objective





- Vital Signs


Vital signs: 


                                   Vital Signs











Temp  98.2 F   03/03/20 11:47


 


Pulse  66   03/03/20 11:47


 


Resp  16   03/03/20 12:00


 


BP  144/68   03/03/20 11:47


 


Pulse Ox  96   03/03/20 11:47








                                 Intake & Output











 03/02/20 03/03/20 03/03/20





 18:59 06:59 18:59


 


Intake Total 819.039 260 482.368


 


Output Total 400 1725 300


 


Balance 419.039 -1465 182.368


 


Weight  103.2 kg 


 


Intake:   


 


  IV 30 10 


 


    Invasive Line 2 30 10 


 


  Intake, IV Titration 49.039  242.368





  Amount   


 


    Heparin Sod,Pork in 0.45% 49.039  242.368





    NaCl 25,000 unit In 0.45   





    % NaCl 1 250ml.bag @ 9.76   





    UNITS/KG/HR 10.005 mls/   





    hr IV .Q24H [facility] Rx#:   





    [id number]   


 


  Oral 740 250 240


 


Output:   


 


  Urine 400 1725 300


 


Other:   


 


  Voiding Method Urinal Urinal Urinal





 Diaper  


 


  # Voids  8 1


 


  # Bowel Movements   1














- Exam





PHYSICAL EXAMINATION: This is a 74-year-old male in no apparent distress at the 

time of my examination.


HEENT: Head is atraumatic, normocephalic. Pupils are equal, round. Sclerae 

anicteric. Conjunctivae are clear. Mucous membranes of the mouth are moist. Neck

is supple. There is no jugular venous distention. No carotid bruit is heard. 


CHEST EXAMINATION: Lungs are diminished bilaterally.  No chest wall tenderness 

is noted on palpation or with deep breathing. 


HEART EXAMINATION: Heart regular rate and rhythm. S1, S2 heard. No murmurs, 

gallops or rub.


ABDOMEN: Soft, nontender. Bowel sounds are heard. No organomegaly noted. 


EXTREMITIES: 2+ peripheral pulses.  Trace to 1+ bilateral lower extremity edema,

 no calf tenderness noted. 


NEUROLOGIC EXAMINATION: Patient is awake, alert and oriented x3. 








- Labs


CBC & Chem 7: 


                                 03/03/20 06:42





                                 03/03/20 06:42


Labs: 


                  Abnormal Lab Results - Last 24 Hours (Table)











  03/02/20 03/02/20 03/02/20 Range/Units





  16:42 18:05 21:05 


 


Plt Count     (150-450)  k/uL


 


APTT   53.7 H   (22.0-30.0)  sec


 


Sodium     (137-145)  mmol/L


 


BUN     (9-20)  mg/dL


 


Glucose     (74-99)  mg/dL


 


POC Glucose (mg/dL)  181 H   175 H  (75-99)  mg/dL


 


Calcium     (8.4-10.2)  mg/dL














  03/03/20 03/03/20 03/03/20 Range/Units





  06:42 06:42 06:42 


 


Plt Count  109 L    (150-450)  k/uL


 


APTT    38.0 H  (22.0-30.0)  sec


 


Sodium   136 L   (137-145)  mmol/L


 


BUN   28 H   (9-20)  mg/dL


 


Glucose   133 H   (74-99)  mg/dL


 


POC Glucose (mg/dL)     (75-99)  mg/dL


 


Calcium   7.9 L   (8.4-10.2)  mg/dL














  03/03/20 Range/Units





  07:00 


 


Plt Count   (150-450)  k/uL


 


APTT   (22.0-30.0)  sec


 


Sodium   (137-145)  mmol/L


 


BUN   (9-20)  mg/dL


 


Glucose   (74-99)  mg/dL


 


POC Glucose (mg/dL)  129 H  (75-99)  mg/dL


 


Calcium   (8.4-10.2)  mg/dL








                      Microbiology - Last 24 Hours (Table)











 03/01/20 04:45 Blood Culture - Preliminary





 Blood    No Growth after 48 hours


 


 03/01/20 05:39 Urine Culture - Final





 Urine,Clean Catch 














Assessment and Plan


Plan: 





FINAL ASSESSMENT AND PLAN: 


#1 non-ST elevated myocardial infarction


#2 congestive heart failure diastolic acute on chronic


#3 hypertension


#4 worsening shortness of breath, VQ scan negative


#5 diabetes mellitus


#6 chronic kidney disease, stage III


#7 hyperlipidemia








Plan


We'll continue current dose of IV Lasix for 24 hours.  Patient may need to 

undergo cardiac catheterization for further evaluation of possible underlying 

obstructive coronary artery disease.  This will be discussed further with the 

patient and the family today by Dr. Crooks.


DNP note has been reviewed, I agree with a documented findings and plan of care.

 Patient was seen and examined.

## 2020-03-03 NOTE — P.PN
Subjective


Progress Note Date: 03/03/20





Patient is a 74-year-old male who presented to [facility] 

emergency room via EMS with multiple vague symptoms including confusion and 

mental status changes, episodes of shortness of breath he was evaluated in the 

emergency room, he had evidence of urinary tract infection and evidence of e

levated troponin level, he also had evidence of dehydration was elevated BUN and

creatinine he was admitted to telemetry floor for further evaluation and 

cardiology consultation was requested.  Patient was seen and examined on the 

telemetry floor he is alert and oriented 3 in no apparent distress, he denies 

any chest pain or shortness of breath there is no fever or chills no headache or

dizziness no nausea or vomiting no abdominal pain no diarrhea no burning was 

urination no frequency or urgency and no hematuria








On 03/02/2020 patient is alert and oriented 3.  2-D echo completed awaiting to 

be red.  Patient remains on IV Lasix and IV heparin.  Cardiology services are 

following.  Repeat chest x-ray has been ordered due to wheezing is auscultation.

 DuoNeb breathing treatments ordered creatinine is improving at 1.32 and bun 33.

 At this time patient denies chest pain.  Patient is any nausea vomiting or 

diarrhea.  Patient denies any urinary burning or frequency





On 03/03/2020 patient is alert and oriented 3.  2-D echo completed showing an 

EF of 45-50%.  Patient remains on IV Lasix per cardiology services.  Creatinine 

improving to 1.12 and bun 28.  Did discuss case with cardiology nurse 

practitioner Dr. Tellez possible plans for heart cath will discuss with Dr. Crooks.  At this time patient remains on heparin drip.  Wheeziness has improved.

 Patient is maintained on DuoNeb breathing treatments.  No active 

cardiopulmonary disease.  No change.  Patient denies chest pain or shortness of 

breath.  Patient denies nausea vomiting or diarrhea.  Patient denies any urinary

burning or frequency





Objective





- Vital Signs


Vital signs: 


                                   Vital Signs











Temp  98.2 F   03/03/20 11:47


 


Pulse  66   03/03/20 11:47


 


Resp  16   03/03/20 12:00


 


BP  144/68   03/03/20 11:47


 


Pulse Ox  96   03/03/20 11:47








                                 Intake & Output











 03/02/20 03/03/20 03/03/20





 18:59 06:59 18:59


 


Intake Total 819.039 260 482.368


 


Output Total 400 1725 300


 


Balance 419.039 -1465 182.368


 


Weight  103.2 kg 


 


Intake:   


 


  IV 30 10 


 


    Invasive Line 2 30 10 


 


  Intake, IV Titration 49.039  242.368





  Amount   


 


    Heparin Sod,Pork in 0.45% 49.039  242.368





    NaCl 25,000 unit In 0.45   





    % NaCl 1 250ml.bag @ 9.76   





    UNITS/KG/HR 10.005 mls/   





    hr IV .Q24H [facility] Rx#:   





    [id number]   


 


  Oral 740 250 240


 


Output:   


 


  Urine 400 1725 300


 


Other:   


 


  Voiding Method Urinal Urinal Urinal





 Diaper  


 


  # Voids  8 1


 


  # Bowel Movements   1














- Exam





In general patient is alert and oriented 3 in no apparent distress


HEENT head normocephalic and atraumatic


Neck is supple no JVD no goiter no lymphadenopathy


Chest exam reveals a few scattered rhonchi no wheezing


Cardiac exam reveals regular heart sounds no gallops no murmurs


Abdomen is soft nontender no organomegaly


Extremity exam reveals no edema no cyanosis or clubbing


Neurological examination reveals no gross focal deficits





- Labs


CBC & Chem 7: 


                                 03/03/20 06:42





                                 03/03/20 06:42


Labs: 


                  Abnormal Lab Results - Last 24 Hours (Table)











  03/02/20 03/02/20 03/02/20 Range/Units





  16:42 18:05 21:05 


 


Plt Count     (150-450)  k/uL


 


APTT   53.7 H   (22.0-30.0)  sec


 


Sodium     (137-145)  mmol/L


 


BUN     (9-20)  mg/dL


 


Glucose     (74-99)  mg/dL


 


POC Glucose (mg/dL)  181 H   175 H  (75-99)  mg/dL


 


Calcium     (8.4-10.2)  mg/dL














  03/03/20 03/03/20 03/03/20 Range/Units





  06:42 06:42 06:42 


 


Plt Count  109 L    (150-450)  k/uL


 


APTT    38.0 H  (22.0-30.0)  sec


 


Sodium   136 L   (137-145)  mmol/L


 


BUN   28 H   (9-20)  mg/dL


 


Glucose   133 H   (74-99)  mg/dL


 


POC Glucose (mg/dL)     (75-99)  mg/dL


 


Calcium   7.9 L   (8.4-10.2)  mg/dL














  03/03/20 Range/Units





  07:00 


 


Plt Count   (150-450)  k/uL


 


APTT   (22.0-30.0)  sec


 


Sodium   (137-145)  mmol/L


 


BUN   (9-20)  mg/dL


 


Glucose   (74-99)  mg/dL


 


POC Glucose (mg/dL)  129 H  (75-99)  mg/dL


 


Calcium   (8.4-10.2)  mg/dL








                      Microbiology - Last 24 Hours (Table)











 03/01/20 04:45 Blood Culture - Preliminary





 Blood    No Growth after 48 hours


 


 03/01/20 05:39 Urine Culture - Final





 Urine,Clean Catch 














Assessment and Plan


Assessment: 


1. shortness of breath likely related to congestive heart failure and possibly 

related to ischemic heart disease, VQ scan was done and was negative. 





2.  non-ST elevation myocardial infarction.  Patient remains on IV heparin.  Per

cardiology patient may need to undergo cardiac catheterization.  This was 

discussed with cardiology services.





3.  evidence of dehydration was elevated BUN and creatinine.  Creatinine 

improving to 1.3 to and bun 33.  This does appear baseline for patient.  

Creatinine improving to 1.12 and bun 28





4.  Acute on chronic diastolic congestive heart failure.  2-D echo completed 

showing an EF of 45-50%  BNP 10,600. Patient currently on IV Lasix.  Discussed 

case with cardiology patient will continue current dose of IV Lasix for 24 hours





5.  evidence of urinary tract infection.  Patient asymptomatic.  Urine culture 

negative.  Urine culture showing no growth.  No need for antibiotics at this 

time 





6.  underlying history of chronic kidney disease stage II





7.  underlying history of diabetes mellitus type II.  Home meds resumed





8.  Increased wheeziness.  Chest x-ray and DuoNeb breathing.  Chest x-ray 

completed showing no active cardiopulmonary disease.  No change.  Wheeziness has

resolved





DVT prophylaxis IV heparin.  GI prophylaxis Pepcid


Per cardiology continue current dose of IV Lasix possible plans for cardiac 

catheterization














I performed an examination of the patient and discussed their management with 

the Nurse Practitioner.  I have reviewed the Nurse Practitioner's notes and 

agree with the documented findings and plan of care

## 2020-03-03 NOTE — CDI
Documentation Clarification Form



Date: 03/03/2020 10:50:02 AM

From: Leyda Fajardo CCS, CCDS

Phone:  [phone]

MRN: [id number]

Admit Date: 03/01/2020 03:40:00 PM

Patient Name: Luis Acosta

Visit Number: [id number]

Discharge Date:  





ATTENTION: The Clinical Documentation Specialists (CDI) and [facility] Coding Staff 
appreciate your assistance in clarifying documentation. Please respond to the 
clarification below the line at the bottom and electronically sign. The CDI & 
[facility] Coding staff will review the response and follow-up if needed. Please note: 
Queries are made part of the Legal Health Record. If you have any questions, 
please contact the author of this message via ITS.



Dr. Marlin Tafoya:



Conflicting documentation has been found in the medical record:



Per the Cardiology Consult on 3/1 & subsequent progress note on 3/2, the CKD 
stage is documented as Stage III.

Per the History & Physical on 3/1 & subsequent progress note on 3/2, the CKD 
stage is documented as Stage II.



History/Risk Factors: Hypertension, Diastolic CHF, TIA, IDDM II, Hyperlipidemia,
Smoker.

Clinical Indicators: Presented to the ED on 3/1 via EMS with confusion & mental 
status changes, episodes of SOB & had evidence of UTI, elevated troponins & 
dehydration. Diagnosed with a NSTEMI, Acute on chronic diastolic CHF, UTI & 
Hypoglycemia.



LABS 3/1: BUN 29, Cr 1.54, GFR 44.  

3/2: BUN 33, Cr 1.32, GFR 53

3/3: BUN 28, Cr 1.12, GFR 64



Treatment: IV Heparin drip, IV Lasix, Insulin sq



In your opinion, what is the most clinically appropriate stage of CKD for this 
patient?  



    CKD Stage II

    CKD Stage III

    Other, please specify: ________________________

    Unable to determine





(Last Revision: April 2018)

___________________________________________________________________________

chronic kidney disease stage 3

MTDD

## 2020-03-03 NOTE — CT
EXAMINATION TYPE: CT abdomen pelvis wo con

 

DATE OF EXAM: 3/3/2020

 

COMPARISON:

 

HISTORY: ABDOMINAL PAIN

 

CT DLP: 1642.4 mGycm

Automated exposure control for dose reduction was used.

 

Images were obtained from the diaphragm to the floor the pelvis with oral contrast only.

 

Lung bases are clear of consolidation. There is no pleural effusion. Heart is enlarged. Stomach is in
tact. There are numerous calcified splenic granulomata. There is no pancreatic mass. Gallbladder appe
ars normal. Liver shows no focal defect.

 

There is no adrenal mass. Kidneys have normal size and contour. There is no hydronephrosis. Ureters a
re not dilated. There is no retroperitoneal adenopathy. Bladder distends smoothly. There is no inguin
al hernia. There is possible right-sided scrotal hydrocele. Appendix appears normal.

 

There is no evidence of bowel obstruction. There is some wall thickening of the proximal sigmoid colo
n. The bowel is locally dilated up to 8 cm. On coronal image 39 there is suggestion of an annular con
stricting lesion of the proximal sigmoid colon with shoulders.

 

Lumbar spine is intact. There is no significant compression deformity. Bony pelvis is intact. I see n
o bony destructive process.

 

IMPRESSION:

Locally dilated proximal sigmoid colon with irregular wall thickening suggestive of constricting lesi
on and tumor. Follow-up recommended. 

 

 normal appendix. 

Mild inflammatory changes around the proximal sigmoid colon.

## 2020-03-04 LAB
ALBUMIN SERPL-MCNC: 2.7 G/DL (ref 3.5–5)
ALP SERPL-CCNC: 140 U/L (ref 38–126)
ALT SERPL-CCNC: 13 U/L (ref 4–49)
ANION GAP SERPL CALC-SCNC: 6 MMOL/L
AST SERPL-CCNC: 23 U/L (ref 17–59)
BASOPHILS # BLD AUTO: 0 K/UL (ref 0–0.2)
BASOPHILS NFR BLD AUTO: 1 %
BUN SERPL-SCNC: 25 MG/DL (ref 9–20)
CALCIUM SPEC-MCNC: 8.6 MG/DL (ref 8.4–10.2)
CHLORIDE SERPL-SCNC: 106 MMOL/L (ref 98–107)
CO2 SERPL-SCNC: 27 MMOL/L (ref 22–30)
EOSINOPHIL # BLD AUTO: 0.1 K/UL (ref 0–0.7)
EOSINOPHIL NFR BLD AUTO: 3 %
ERYTHROCYTE [DISTWIDTH] IN BLOOD BY AUTOMATED COUNT: 3.65 M/UL (ref 4.3–5.9)
ERYTHROCYTE [DISTWIDTH] IN BLOOD: 14.5 % (ref 11.5–15.5)
GLUCOSE BLD-MCNC: 125 MG/DL (ref 75–99)
GLUCOSE BLD-MCNC: 132 MG/DL (ref 75–99)
GLUCOSE BLD-MCNC: 188 MG/DL (ref 75–99)
GLUCOSE BLD-MCNC: 217 MG/DL (ref 75–99)
GLUCOSE BLD-MCNC: 270 MG/DL (ref 75–99)
GLUCOSE SERPL-MCNC: 128 MG/DL (ref 74–99)
HCT VFR BLD AUTO: 31 % (ref 39–53)
HGB BLD-MCNC: 9.9 GM/DL (ref 13–17.5)
LYMPHOCYTES # SPEC AUTO: 0.6 K/UL (ref 1–4.8)
LYMPHOCYTES NFR SPEC AUTO: 17 %
MCH RBC QN AUTO: 27.1 PG (ref 25–35)
MCHC RBC AUTO-ENTMCNC: 31.9 G/DL (ref 31–37)
MCV RBC AUTO: 84.9 FL (ref 80–100)
MONOCYTES # BLD AUTO: 0.3 K/UL (ref 0–1)
MONOCYTES NFR BLD AUTO: 7 %
NEUTROPHILS # BLD AUTO: 2.6 K/UL (ref 1.3–7.7)
NEUTROPHILS NFR BLD AUTO: 71 %
PLATELET # BLD AUTO: 125 K/UL (ref 150–450)
POTASSIUM SERPL-SCNC: 4.6 MMOL/L (ref 3.5–5.1)
PROT SERPL-MCNC: 6 G/DL (ref 6.3–8.2)
SODIUM SERPL-SCNC: 139 MMOL/L (ref 137–145)
WBC # BLD AUTO: 3.7 K/UL (ref 3.8–10.6)

## 2020-03-04 RX ADMIN — INSULIN ASPART SCH UNIT: 100 INJECTION, SOLUTION INTRAVENOUS; SUBCUTANEOUS at 17:16

## 2020-03-04 RX ADMIN — TAMSULOSIN HYDROCHLORIDE SCH MG: 0.4 CAPSULE ORAL at 20:56

## 2020-03-04 RX ADMIN — HEPARIN SODIUM SCH MLS/HR: 10000 INJECTION, SOLUTION INTRAVENOUS at 06:29

## 2020-03-04 RX ADMIN — CARVEDILOL SCH MG: 6.25 TABLET, FILM COATED ORAL at 20:55

## 2020-03-04 RX ADMIN — HEPARIN SODIUM SCH UNIT: 5000 INJECTION, SOLUTION INTRAVENOUS; SUBCUTANEOUS at 20:56

## 2020-03-04 RX ADMIN — ISOSORBIDE MONONITRATE SCH MG: 30 TABLET, EXTENDED RELEASE ORAL at 06:27

## 2020-03-04 RX ADMIN — FUROSEMIDE SCH MG: 10 INJECTION, SOLUTION INTRAMUSCULAR; INTRAVENOUS at 08:25

## 2020-03-04 RX ADMIN — ASPIRIN 81 MG CHEWABLE TABLET SCH: 81 TABLET CHEWABLE at 07:09

## 2020-03-04 RX ADMIN — Medication SCH MG: at 06:27

## 2020-03-04 RX ADMIN — IPRATROPIUM BROMIDE AND ALBUTEROL SULFATE SCH ML: .5; 3 SOLUTION RESPIRATORY (INHALATION) at 10:48

## 2020-03-04 RX ADMIN — ATORVASTATIN CALCIUM SCH: 80 TABLET, FILM COATED ORAL at 07:09

## 2020-03-04 RX ADMIN — INSULIN ASPART SCH UNIT: 100 INJECTION, SOLUTION INTRAVENOUS; SUBCUTANEOUS at 20:56

## 2020-03-04 RX ADMIN — IPRATROPIUM BROMIDE AND ALBUTEROL SULFATE SCH ML: .5; 3 SOLUTION RESPIRATORY (INHALATION) at 07:29

## 2020-03-04 RX ADMIN — FAMOTIDINE SCH MG: 20 TABLET, FILM COATED ORAL at 06:26

## 2020-03-04 RX ADMIN — LISINOPRIL SCH MG: 20 TABLET ORAL at 06:27

## 2020-03-04 RX ADMIN — IPRATROPIUM BROMIDE AND ALBUTEROL SULFATE SCH ML: .5; 3 SOLUTION RESPIRATORY (INHALATION) at 16:40

## 2020-03-04 RX ADMIN — FUROSEMIDE SCH MG: 20 TABLET ORAL at 20:55

## 2020-03-04 RX ADMIN — INSULIN ASPART SCH: 100 INJECTION, SOLUTION INTRAVENOUS; SUBCUTANEOUS at 06:42

## 2020-03-04 RX ADMIN — SPIRONOLACTONE SCH MG: 25 TABLET, FILM COATED ORAL at 08:25

## 2020-03-04 RX ADMIN — HEPARIN SODIUM SCH MLS/HR: 10000 INJECTION, SOLUTION INTRAVENOUS at 08:26

## 2020-03-04 RX ADMIN — INSULIN DETEMIR SCH: 100 INJECTION, SOLUTION SUBCUTANEOUS at 06:42

## 2020-03-04 RX ADMIN — CARVEDILOL SCH MG: 6.25 TABLET, FILM COATED ORAL at 06:27

## 2020-03-04 RX ADMIN — INSULIN ASPART SCH UNIT: 100 INJECTION, SOLUTION INTRAVENOUS; SUBCUTANEOUS at 12:25

## 2020-03-04 RX ADMIN — IPRATROPIUM BROMIDE AND ALBUTEROL SULFATE SCH: .5; 3 SOLUTION RESPIRATORY (INHALATION) at 21:53

## 2020-03-04 NOTE — P.PN
Subjective


Progress Note Date: 03/04/20


This is a 74-year-old gentleman with past medical history of diabetes, 

hypertension, hyperlipidemia, prior TIA, obesity, current preventative the 

hospital with mental status changes, dizziness, diaphoresis and shortness of 

breath.  He is currently receiving treatment for congestive cardiac failure.  

Patient was also noted to have abnormality in his troponins suggestive of a 

possible non-Q-wave myocardial infarction.  The V/Q scan was negative for 

pulmonary embolism.  Blood pressure 132/60 with a heart rate in the 70s, 92% on 

room air.  White blood cell count 5.2, hemoglobin 9.5, platelet count 110.  

Creatinine 1.3 today.  Echocardiogram with Doppler study revealed an ejection 

fraction of 45-50%.





03/03/2020


Patient seen and examined this morning, breathing is significantly improved.  

Diuresing well on IV Lasix.  Blood pressure 144/68 with a heart rate in the 60s,

96% on room air.  Sodium 136, potassium 4.1, BUN 28, creatinine 1.2.  Continues 

to be on IV Lasix 40 mg daily, we will continue this for another 24 hours.  

Repeat chest x-ray did not reveal any active cardiopulmonary disease.  Patient 

continues to have trace to 1+ bilateral peripheral edema.








03/04/2020





Patient seen and examined this morning, states he does not feel too well, denies

any worsening shortness of breath, sleepy today.  Diuresing well.  Creatinine 

1.1.  Blood cultures came back positive for gram-positive bacilli.  The decision

was made to cancel the cardiac catheterization and maximize the patient's 

medical therapy.  We will continue current dose of IV Lasix for 24 hours, check 

lytes BUN and creatinine in the morning.














Objective





- Vital Signs


Vital signs: 


                                   Vital Signs











Temp  97.7 F   03/04/20 08:25


 


Pulse  68   03/04/20 11:02


 


Resp  16   03/04/20 08:25


 


BP  124/54   03/04/20 08:25


 


Pulse Ox  94 L  03/04/20 08:25








                                 Intake & Output











 03/03/20 03/04/20 03/04/20





 18:59 06:59 18:59


 


Intake Total 400.241 1085 407.496


 


Output Total 300 2000 550


 


Balance 659.551 100 -142.504


 


Weight  103.6 kg 


 


Intake:   


 


  IV 20  


 


    Invasive Line 3 20  


 


  Intake, IV Titration 158.431 9223 47.496





  Amount   


 


    Heparin Sod,Pork in 0.45% 459.551 250 47.496





    NaCl 25,000 unit In 0.45   





    % NaCl 1 250ml.bag @ 9.76   





    UNITS/KG/HR 10.005 mls/   





    hr IV .Q24H [facility] Rx#:   





    [id number]   


 


    Sodium Chloride 0.9% 1,  1000 





    000 ml In Empty Bag 1 bag   





    @ 1 ML/KG/.2 mls/   





    hr IV .Q9H42M ONE Rx#:   





    [id number]   


 


  Oral 480 850 360


 


Output:   


 


  Urine 300 2000 550


 


Other:   


 


  Voiding Method Urinal Urinal Urinal


 


  # Voids 1  1


 


  # Bowel Movements 1  1














- Exam





PHYSICAL EXAMINATION: This is a 74-year-old male in no apparent distress at the 

time of my examination.


HEENT: Head is atraumatic, normocephalic. Pupils are equal, round. Sclerae 

anicteric. Conjunctivae are clear. Mucous membranes of the mouth are moist. Neck

is supple. There is no jugular venous distention. No carotid bruit is heard. 


CHEST EXAMINATION: Lungs are diminished bilaterally.  No chest wall tenderness 

is noted on palpation or with deep breathing. 


HEART EXAMINATION: Heart regular rate and rhythm. S1, S2 heard. No murmurs, 

gallops or rub.


ABDOMEN: Soft, nontender. Bowel sounds are heard. No organomegaly noted. 


EXTREMITIES: 2+ peripheral pulses.  Trace to 1+ bilateral lower extremity edema,

 no calf tenderness noted. 


NEUROLOGIC EXAMINATION: Patient is awake, alert and oriented x3. 








- Labs


CBC & Chem 7: 


                                 03/04/20 05:39





                                 03/04/20 05:39


Labs: 


                  Abnormal Lab Results - Last 24 Hours (Table)











  03/03/20 03/03/20 03/03/20 Range/Units





  16:09 16:38 20:21 


 


WBC     (3.8-10.6)  k/uL


 


RBC     (4.30-5.90)  m/uL


 


Hgb     (13.0-17.5)  gm/dL


 


Hct     (39.0-53.0)  %


 


Plt Count     (150-450)  k/uL


 


Lymphocytes #     (1.0-4.8)  k/uL


 


APTT  50.0 H    (22.0-30.0)  sec


 


BUN     (9-20)  mg/dL


 


Glucose     (74-99)  mg/dL


 


POC Glucose (mg/dL)   204 H  262 H  (75-99)  mg/dL


 


Alkaline Phosphatase     ()  U/L


 


Total Protein     (6.3-8.2)  g/dL


 


Albumin     (3.5-5.0)  g/dL














  03/04/20 03/04/20 03/04/20 Range/Units





  05:39 05:39 05:39 


 


WBC  3.7 L    (3.8-10.6)  k/uL


 


RBC  3.65 L    (4.30-5.90)  m/uL


 


Hgb  9.9 L    (13.0-17.5)  gm/dL


 


Hct  31.0 L    (39.0-53.0)  %


 


Plt Count  125 L    (150-450)  k/uL


 


Lymphocytes #  0.6 L    (1.0-4.8)  k/uL


 


APTT    51.1 H  (22.0-30.0)  sec


 


BUN   25 H   (9-20)  mg/dL


 


Glucose   128 H   (74-99)  mg/dL


 


POC Glucose (mg/dL)     (75-99)  mg/dL


 


Alkaline Phosphatase   140 H   ()  U/L


 


Total Protein   6.0 L   (6.3-8.2)  g/dL


 


Albumin   2.7 L   (3.5-5.0)  g/dL














  03/04/20 03/04/20 Range/Units





  06:06 07:14 


 


WBC    (3.8-10.6)  k/uL


 


RBC    (4.30-5.90)  m/uL


 


Hgb    (13.0-17.5)  gm/dL


 


Hct    (39.0-53.0)  %


 


Plt Count    (150-450)  k/uL


 


Lymphocytes #    (1.0-4.8)  k/uL


 


APTT    (22.0-30.0)  sec


 


BUN    (9-20)  mg/dL


 


Glucose    (74-99)  mg/dL


 


POC Glucose (mg/dL)  132 H  125 H  (75-99)  mg/dL


 


Alkaline Phosphatase    ()  U/L


 


Total Protein    (6.3-8.2)  g/dL


 


Albumin    (3.5-5.0)  g/dL








                      Microbiology - Last 24 Hours (Table)











 03/01/20 04:45 Blood Culture Gram Stain - Preliminary





 Blood 


 


 03/01/20 04:45 Blood Culture - Final





 Blood 














Assessment and Plan


Plan: 





FINAL ASSESSMENT AND PLAN: 


#1 non-ST elevated myocardial infarction


#2 congestive heart failure diastolic acute on chronic


#3 hypertension


#4 worsening shortness of breath, VQ scan negative


#5 diabetes mellitus


#6 chronic kidney disease, stage III


#7 hyperlipidemia








Plan


We'll continue current dose of IV Lasix for 24 hours.  Patient will not undergo 

cardiac catheterization we will maximize his medical therapy.  Continue to 

monitor the intake and output along with daily weights and daily lytes BUN and 

creatinine.





DNP note has been reviewed, I agree with a documented findings and plan of care.

 Patient was seen and examined.

## 2020-03-04 NOTE — PN
PROGRESS NOTE



DATE OF SERVICE:

03/04/2020.



REASON FOR FOLLOWUP:

1. Positive blood culture.

2. Sigmoid colitis.



INTERVAL HISTORY:

The patient is currently afebrile, has been breathing comfortably.  Denies having any

chest pain or cough.  Denies having any abdominal pain and no diarrhea.



PHYSICAL EXAMINATION:

Blood pressure 149/72 with a pulse of 66, temperature 97.7. He is 94% on room air.

General description is an elderly male up in the bed in no distress.

RESPIRATORY SYSTEM: Unlabored breathing. Clear to auscultation.

HEART: S1, S2.  Regular rate and rhythm.

ABDOMEN: Soft. No tenderness.



LABS/IMAGING:

Hemoglobin 9.9, white count 3.7, BUN of 25, creatinine 1.12.  Blood culture with a Gram-

positive. ID and sensitivities pending.



He did have a CT of abdomen and pelvis completed yesterday that showed locally dilated

proximal colon with irregular wall suggestive of constricting lesion, inflammatory

changes in the proximal sigmoid colon.



DIAGNOSTIC IMPRESSION AND PLAN:

Patient with positive blood culture.  Waiting for the final ID and sensitivity, now

with abnormal CT suggestive of colitis and a constricting tumor. Oncology has been

consulted. Patient to continue on Rocephin.  Flagyl has been added and we will monitor

clinical course closely.  Family at the bedside.  Questions were answered.





MMODL / IJN: [id number] / Job#: [id number]

## 2020-03-04 NOTE — P.PN
Subjective


Progress Note Date: 03/04/20





Patient is a 74-year-old male who presented to [facility] 

emergency room via EMS with multiple vague symptoms including confusion and 

mental status changes, episodes of shortness of breath he was evaluated in the 

emergency room, he had evidence of urinary tract infection and evidence of e

levated troponin level, he also had evidence of dehydration was elevated BUN and

creatinine he was admitted to telemetry floor for further evaluation and 

cardiology consultation was requested.  Patient was seen and examined on the 

telemetry floor he is alert and oriented 3 in no apparent distress, he denies 

any chest pain or shortness of breath there is no fever or chills no headache or

dizziness no nausea or vomiting no abdominal pain no diarrhea no burning was 

urination no frequency or urgency and no hematuria








On 03/02/2020 patient is alert and oriented 3.  2-D echo completed awaiting to 

be red.  Patient remains on IV Lasix and IV heparin.  Cardiology services are 

following.  Repeat chest x-ray has been ordered due to wheezing is auscultation.

 DuoNeb breathing treatments ordered creatinine is improving at 1.32 and bun 33.

 At this time patient denies chest pain.  Patient is any nausea vomiting or 

diarrhea.  Patient denies any urinary burning or frequency





On 03/03/2020 patient is alert and oriented 3.  2-D echo completed showing an 

EF of 45-50%.  Patient remains on IV Lasix per cardiology services.  Creatinine 

improving to 1.12 and bun 28.  Did discuss case with cardiology nurse 

practitioner Dr. Tellez possible plans for heart cath will discuss with Dr. Crooks.  At this time patient remains on heparin drip.  Wheeziness has improved.

 Patient is maintained on DuoNeb breathing treatments.  No active 

cardiopulmonary disease.  No change.  Patient denies chest pain or shortness of 

breath.  Patient denies nausea vomiting or diarrhea.  Patient denies any urinary

burning or frequency





On 03/04/2020 patient is alert and oriented 3 family at bedside.  Patient 

having positive blood culture growing gram-negative bacilli.  Infectious disease

has been consulted and patient started on Flagyl and Rocephin.  No plans for 

cardiac catheterization at this time per cardiology CT of abdomen and pelvis 

completed per infectious disease ordered.  Concerns for possible constricting le

rain and tumor.  Oncology services will be consulted.  Patient wife at bedside 

reports that he did have a colonoscopy in January with Dr. Griffin and and was 

recommended after that colonoscopy to obtain a CT of abdomen and pelvis but 

patient declined.  At this time patient denies any chest pain or shortness of 

breath.  Patient denies nausea vomiting or diarrhea.  Patient denies any urinary

burning or frequency.





Objective





- Vital Signs


Vital signs: 


                                   Vital Signs











Temp  97.7 F   03/04/20 11:05


 


Pulse  66   03/04/20 11:05


 


Resp  16   03/04/20 11:05


 


BP  149/72   03/04/20 11:05


 


Pulse Ox  94 L  03/04/20 11:05








                                 Intake & Output











 03/03/20 03/04/20 03/04/20





 18:59 06:59 18:59


 


Intake Total 657.544 2422 417.496


 


Output Total 300 2000 550


 


Balance 659.551 100 -132.504


 


Weight  103.6 kg 


 


Intake:   


 


  IV 20  10


 


    Invasive Line 3 20  10


 


  Intake, IV Titration 254.067 9898 47.496





  Amount   


 


    Heparin Sod,Pork in 0.45% 459.551 250 47.496





    NaCl 25,000 unit In 0.45   





    % NaCl 1 250ml.bag @ 9.76   





    UNITS/KG/HR 10.005 mls/   





    hr IV .Q24H [facility] Rx#:   





    [id number]   


 


    Sodium Chloride 0.9% 1,  1000 





    000 ml In Empty Bag 1 bag   





    @ 1 ML/KG/.2 mls/   





    hr IV .Q9H42M [facility] Rx#:   





    [id number]   


 


  Oral 480 850 360


 


Output:   


 


  Urine 300 2000 550


 


Other:   


 


  Voiding Method Urinal Urinal Urinal


 


  # Voids 1  1


 


  # Bowel Movements 1  1














- Exam





In general patient is alert and oriented 3 in no apparent distress


HEENT head normocephalic and atraumatic


Neck is supple no JVD no goiter no lymphadenopathy


Chest exam reveals a few scattered rhonchi no wheezing


Cardiac exam reveals regular heart sounds no gallops no murmurs


Abdomen is soft nontender no organomegaly


Extremity exam reveals no edema no cyanosis or clubbing


Neurological examination reveals no gross focal deficits





- Labs


CBC & Chem 7: 


                                 03/04/20 05:39





                                 03/04/20 05:39


Labs: 


                  Abnormal Lab Results - Last 24 Hours (Table)











  03/03/20 03/03/20 03/03/20 Range/Units





  16:09 16:38 20:21 


 


WBC     (3.8-10.6)  k/uL


 


RBC     (4.30-5.90)  m/uL


 


Hgb     (13.0-17.5)  gm/dL


 


Hct     (39.0-53.0)  %


 


Plt Count     (150-450)  k/uL


 


Lymphocytes #     (1.0-4.8)  k/uL


 


APTT  50.0 H    (22.0-30.0)  sec


 


BUN     (9-20)  mg/dL


 


Glucose     (74-99)  mg/dL


 


POC Glucose (mg/dL)   204 H  262 H  (75-99)  mg/dL


 


Alkaline Phosphatase     ()  U/L


 


Total Protein     (6.3-8.2)  g/dL


 


Albumin     (3.5-5.0)  g/dL














  03/04/20 03/04/20 03/04/20 Range/Units





  05:39 05:39 05:39 


 


WBC  3.7 L    (3.8-10.6)  k/uL


 


RBC  3.65 L    (4.30-5.90)  m/uL


 


Hgb  9.9 L    (13.0-17.5)  gm/dL


 


Hct  31.0 L    (39.0-53.0)  %


 


Plt Count  125 L    (150-450)  k/uL


 


Lymphocytes #  0.6 L    (1.0-4.8)  k/uL


 


APTT    51.1 H  (22.0-30.0)  sec


 


BUN   25 H   (9-20)  mg/dL


 


Glucose   128 H   (74-99)  mg/dL


 


POC Glucose (mg/dL)     (75-99)  mg/dL


 


Alkaline Phosphatase   140 H   ()  U/L


 


Total Protein   6.0 L   (6.3-8.2)  g/dL


 


Albumin   2.7 L   (3.5-5.0)  g/dL














  03/04/20 03/04/20 03/04/20 Range/Units





  06:06 07:14 11:55 


 


WBC     (3.8-10.6)  k/uL


 


RBC     (4.30-5.90)  m/uL


 


Hgb     (13.0-17.5)  gm/dL


 


Hct     (39.0-53.0)  %


 


Plt Count     (150-450)  k/uL


 


Lymphocytes #     (1.0-4.8)  k/uL


 


APTT     (22.0-30.0)  sec


 


BUN     (9-20)  mg/dL


 


Glucose     (74-99)  mg/dL


 


POC Glucose (mg/dL)  132 H  125 H  188 H  (75-99)  mg/dL


 


Alkaline Phosphatase     ()  U/L


 


Total Protein     (6.3-8.2)  g/dL


 


Albumin     (3.5-5.0)  g/dL








                      Microbiology - Last 24 Hours (Table)











 03/01/20 04:45 Blood Culture Gram Stain - Preliminary





 Blood 


 


 03/01/20 04:45 Blood Culture - Final





 Blood 














Assessment and Plan


Assessment: 


1. shortness of breath likely related to congestive heart failure and possibly 

related to ischemic heart disease, VQ scan was done and was negative. 





2.  non-ST elevation myocardial infarction.  Patient remains on IV heparin.  At 

this time no plans for cardiac catheterization continue medical management





3.  evidence of dehydration was elevated BUN and creatinine.  Creatinine 

improving to 1.3 to and bun 33.  This does appear baseline for patient.  

Creatinine improving to 1.12 and bun 28





4.  Acute on chronic diastolic congestive heart failure.  2-D echo completed 

showing an EF of 45-50%  BNP 10,600. Patient currently on IV Lasix.  Discussed 

case with cardiology patient will continue current dose of IV Lasix for 24 

hours.  IV Lasix has been changed to Lasix 20 mg twice a day





5.  evidence of urinary tract infection.  Patient asymptomatic.  Urine culture 

negative.  Urine culture showing no growth.  Patient is currently maintained on 

Rocephin





6.  underlying history of chronic kidney disease stage II





7.  underlying history of diabetes mellitus type II.  Home meds resumed





8.  Increased wheeziness.  Chest x-ray and DuoNeb breathing.  Chest x-ray 

completed showing no active cardiopulmonary disease.  No change.  Wheeziness has

resolved





9.  Positive blood culture with gram positive bacilli.  Infectious disease 

consulted she started on Rocephin and Flagyl repeat blood culture ordered





10.  Abnormal CT of abdomen and pelvis.  CT of abdomen and pelvis completed 

showing locally dilated proximal sigmoid colon with irregular wall thickening 

suggestive a constricting lesion in tumor.  Per patients without bedside patient

did have colonoscopy in January with Dr. Griffin.  Oncology services will be 

consulted at this time to further investigate





DVT prophylaxis heparin.  GI prophylaxis Pepcid


Cardiology, infectious disease and oncology services consulted


Patient currently on Rocephin and Flagyl


Repeat cultures ordered














I performed an examination of the patient and discussed their management with 

the Nurse Practitioner.  I have reviewed the Nurse Practitioner's notes and 

agree with the documented findings and plan of care

## 2020-03-05 LAB
ALBUMIN SERPL-MCNC: 2.7 G/DL (ref 3.5–5)
ALP SERPL-CCNC: 144 U/L (ref 38–126)
ALT SERPL-CCNC: 12 U/L (ref 4–49)
ANION GAP SERPL CALC-SCNC: 5 MMOL/L
AST SERPL-CCNC: 16 U/L (ref 17–59)
BASOPHILS # BLD AUTO: 0 K/UL (ref 0–0.2)
BASOPHILS NFR BLD AUTO: 0 %
BUN SERPL-SCNC: 22 MG/DL (ref 9–20)
CALCIUM SPEC-MCNC: 8.3 MG/DL (ref 8.4–10.2)
CHLORIDE SERPL-SCNC: 102 MMOL/L (ref 98–107)
CO2 SERPL-SCNC: 30 MMOL/L (ref 22–30)
EOSINOPHIL # BLD AUTO: 0.1 K/UL (ref 0–0.7)
EOSINOPHIL NFR BLD AUTO: 2 %
ERYTHROCYTE [DISTWIDTH] IN BLOOD BY AUTOMATED COUNT: 3.78 M/UL (ref 4.3–5.9)
ERYTHROCYTE [DISTWIDTH] IN BLOOD: 14.8 % (ref 11.5–15.5)
GLUCOSE BLD-MCNC: 129 MG/DL (ref 75–99)
GLUCOSE BLD-MCNC: 158 MG/DL (ref 75–99)
GLUCOSE BLD-MCNC: 168 MG/DL (ref 75–99)
GLUCOSE BLD-MCNC: 99 MG/DL (ref 75–99)
GLUCOSE SERPL-MCNC: 157 MG/DL (ref 74–99)
HCT VFR BLD AUTO: 32.1 % (ref 39–53)
HGB BLD-MCNC: 10.4 GM/DL (ref 13–17.5)
LYMPHOCYTES # SPEC AUTO: 0.4 K/UL (ref 1–4.8)
LYMPHOCYTES NFR SPEC AUTO: 8 %
MCH RBC QN AUTO: 27.4 PG (ref 25–35)
MCHC RBC AUTO-ENTMCNC: 32.3 G/DL (ref 31–37)
MCV RBC AUTO: 85 FL (ref 80–100)
MONOCYTES # BLD AUTO: 0.3 K/UL (ref 0–1)
MONOCYTES NFR BLD AUTO: 6 %
NEUTROPHILS # BLD AUTO: 4.1 K/UL (ref 1.3–7.7)
NEUTROPHILS NFR BLD AUTO: 83 %
PLATELET # BLD AUTO: 131 K/UL (ref 150–450)
POTASSIUM SERPL-SCNC: 4.2 MMOL/L (ref 3.5–5.1)
PROT SERPL-MCNC: 5.9 G/DL (ref 6.3–8.2)
SODIUM SERPL-SCNC: 137 MMOL/L (ref 137–145)
WBC # BLD AUTO: 5 K/UL (ref 3.8–10.6)

## 2020-03-05 RX ADMIN — ASPIRIN 81 MG CHEWABLE TABLET SCH MG: 81 TABLET CHEWABLE at 09:27

## 2020-03-05 RX ADMIN — SPIRONOLACTONE SCH MG: 25 TABLET, FILM COATED ORAL at 09:28

## 2020-03-05 RX ADMIN — INSULIN ASPART SCH: 100 INJECTION, SOLUTION INTRAVENOUS; SUBCUTANEOUS at 17:35

## 2020-03-05 RX ADMIN — INSULIN ASPART SCH UNIT: 100 INJECTION, SOLUTION INTRAVENOUS; SUBCUTANEOUS at 12:06

## 2020-03-05 RX ADMIN — IPRATROPIUM BROMIDE AND ALBUTEROL SULFATE SCH ML: .5; 3 SOLUTION RESPIRATORY (INHALATION) at 20:48

## 2020-03-05 RX ADMIN — INSULIN ASPART SCH: 100 INJECTION, SOLUTION INTRAVENOUS; SUBCUTANEOUS at 20:53

## 2020-03-05 RX ADMIN — FUROSEMIDE SCH MG: 20 TABLET ORAL at 20:56

## 2020-03-05 RX ADMIN — INSULIN DETEMIR SCH UNIT: 100 INJECTION, SOLUTION SUBCUTANEOUS at 09:28

## 2020-03-05 RX ADMIN — INSULIN ASPART SCH UNIT: 100 INJECTION, SOLUTION INTRAVENOUS; SUBCUTANEOUS at 09:28

## 2020-03-05 RX ADMIN — IPRATROPIUM BROMIDE AND ALBUTEROL SULFATE SCH ML: .5; 3 SOLUTION RESPIRATORY (INHALATION) at 11:40

## 2020-03-05 RX ADMIN — HEPARIN SODIUM SCH UNIT: 5000 INJECTION, SOLUTION INTRAVENOUS; SUBCUTANEOUS at 20:56

## 2020-03-05 RX ADMIN — FUROSEMIDE SCH MG: 20 TABLET ORAL at 09:27

## 2020-03-05 RX ADMIN — LISINOPRIL SCH MG: 20 TABLET ORAL at 09:28

## 2020-03-05 RX ADMIN — HEPARIN SODIUM SCH UNIT: 5000 INJECTION, SOLUTION INTRAVENOUS; SUBCUTANEOUS at 09:29

## 2020-03-05 RX ADMIN — Medication SCH MG: at 09:28

## 2020-03-05 RX ADMIN — CARVEDILOL SCH MG: 6.25 TABLET, FILM COATED ORAL at 09:27

## 2020-03-05 RX ADMIN — FAMOTIDINE SCH MG: 20 TABLET, FILM COATED ORAL at 09:28

## 2020-03-05 RX ADMIN — ATORVASTATIN CALCIUM SCH MG: 80 TABLET, FILM COATED ORAL at 09:27

## 2020-03-05 RX ADMIN — ISOSORBIDE MONONITRATE SCH MG: 30 TABLET, EXTENDED RELEASE ORAL at 09:27

## 2020-03-05 RX ADMIN — TAMSULOSIN HYDROCHLORIDE SCH MG: 0.4 CAPSULE ORAL at 20:56

## 2020-03-05 RX ADMIN — IPRATROPIUM BROMIDE AND ALBUTEROL SULFATE SCH ML: .5; 3 SOLUTION RESPIRATORY (INHALATION) at 08:10

## 2020-03-05 RX ADMIN — CARVEDILOL SCH MG: 6.25 TABLET, FILM COATED ORAL at 20:56

## 2020-03-05 RX ADMIN — IPRATROPIUM BROMIDE AND ALBUTEROL SULFATE SCH ML: .5; 3 SOLUTION RESPIRATORY (INHALATION) at 16:28

## 2020-03-05 NOTE — PN
PROGRESS NOTE



DATE OF SERVICE:

03/05/2020



REASON FOR FOLLOWUP:

1. Positive blood culture.

2. Colitis.



INTERVAL HISTORY:

The patient is currently afebrile.  The patient has been breathing comfortably. 
Denies

having any chest pain or any cough.  No nausea.  No vomiting. No abdominal pain 
or

diarrhea.



PHYSICAL EXAMINATION:

Blood pressure 144/68 with a pulse of 60, temperature 98.4.  He is 94% on room 
air.

General description is an elderly male lying in bed in no distress.

RESPIRATORY SYSTEM: Unlabored breathing. Clear to auscultation anteriorly.

HEART: S1, S2.  Regular rate and rhythm.

ABDOMEN: Soft. No tenderness.



LABS:

Hemoglobin is 10.4, white count 5.0, BUN of 22, creatinine is 1.16.  Blood 
cultures

with Gram-positive bacilli.  Repeat blood cultures have been negative so far.



DIAGNOSTIC IMPRESSION AND PLAN:

1. Patient with positive blood culture  gram-positive bacilli likely 
representing

    contamination.  Follow-up blood culture has been negative so far.

2. Patient with colitis with a constricting mass seen on the CT for colonoscopy

    tomorrow. Covered with Rocephin and Flagyl; to continue.  Continue with 
supportive

    care.





MMODL / IJN: [id number] / Job#: [id number]

[email or web address]

## 2020-03-05 NOTE — P.PN
Subjective


Progress Note Date: 03/05/20


This is a 74-year-old gentleman with past medical history of diabetes, 

hypertension, hyperlipidemia, prior TIA, obesity, current preventative the 

hospital with mental status changes, dizziness, diaphoresis and shortness of 

breath.  He is currently receiving treatment for congestive cardiac failure.  

Patient was also noted to have abnormality in his troponins suggestive of a 

possible non-Q-wave myocardial infarction.  The V/Q scan was negative for 

pulmonary embolism.  Blood pressure 132/60 with a heart rate in the 70s, 92% on 

room air.  White blood cell count 5.2, hemoglobin 9.5, platelet count 110.  

Creatinine 1.3 today.  Echocardiogram with Doppler study revealed an ejection 

fraction of 45-50%.





03/03/2020


Patient seen and examined this morning, breathing is significantly improved.  

Diuresing well on IV Lasix.  Blood pressure 144/68 with a heart rate in the 60s,

96% on room air.  Sodium 136, potassium 4.1, BUN 28, creatinine 1.2.  Continues 

to be on IV Lasix 40 mg daily, we will continue this for another 24 hours.  

Repeat chest x-ray did not reveal any active cardiopulmonary disease.  Patient 

continues to have trace to 1+ bilateral peripheral edema.








03/04/2020





Patient seen and examined this morning, states he does not feel too well, denies

any worsening shortness of breath, sleepy today.  Diuresing well.  Creatinine 

1.1.  Blood cultures came back positive for gram-positive bacilli.  The decision

was made to cancel the cardiac catheterization and maximize the patient's 

medical therapy.  We will continue current dose of IV Lasix for 24 hours, check 

lytes BUN and creatinine in the morning.








03/05/2020


Patient was seen and examined this morning, [facility]  Blood pressure 150/70 with a heart rate in the 60s, 93% 

on room air.  White blood cell count 5.0, hemoglobin 10.4, platelet count 131.  

Sodium 137, potassium 4.2, BUN 22, creatinine 1.1.














Objective





- Vital Signs


Vital signs: 


                                   Vital Signs











Temp  98.0 F   03/05/20 12:00


 


Pulse  64   03/05/20 12:00


 


Resp  18   03/05/20 12:00


 


BP  156/78   03/05/20 12:00


 


Pulse Ox  93 L  03/05/20 12:00








                                 Intake & Output











 03/04/20 03/05/20 03/05/20





 18:59 06:59 18:59


 


Intake Total 947.496 240 240


 


Output Total 1100 500 


 


Balance -152.504 -260 240


 


Weight  102.7 kg 


 


Intake:   


 


  IV 20  


 


    Invasive Line 3 20  


 


  Intake, IV Titration 47.496  





  Amount   


 


    Heparin Sod,Pork in 0.45% 47.496  





    NaCl 25,000 unit In 0.45   





    % NaCl 1 250ml.bag @ 9.76   





    UNITS/KG/HR 10.005 mls/   





    hr IV .Q24H [facility] Rx#:   





    [id number]   


 


  Oral 880 240 240


 


Output:   


 


  Urine 1100 500 


 


Other:   


 


  Voiding Method Urinal Urinal Toilet


 


  # Voids 1  


 


  # Bowel Movements 1  














- Exam





PHYSICAL EXAMINATION: This is a 74-year-old male in no apparent distress at the 

time of my examination.


HEENT: Head is atraumatic, normocephalic. Pupils are equal, round. Sclerae 

anicteric. Conjunctivae are clear. Mucous membranes of the mouth are moist. Neck

is supple. There is no jugular venous distention. No carotid bruit is heard. 


CHEST EXAMINATION: Lungs are diminished bilaterally.  No chest wall tenderness 

is noted on palpation or with deep breathing. 


HEART EXAMINATION: Heart regular rate and rhythm. S1, S2 heard. No murmurs, 

gallops or rub.


ABDOMEN: Soft, nontender. Bowel sounds are heard. No organomegaly noted. 


EXTREMITIES: 2+ peripheral pulses.  Trace to 1+ bilateral lower extremity edema,

 no calf tenderness noted. 


NEUROLOGIC EXAMINATION: Patient is awake, alert and oriented x3. 








- Labs


CBC & Chem 7: 


                                 03/05/20 06:20





                                 03/05/20 06:20


Labs: 


                  Abnormal Lab Results - Last 24 Hours (Table)











  03/04/20 03/04/20 03/05/20 Range/Units





  16:56 20:34 06:20 


 


RBC    3.78 L  (4.30-5.90)  m/uL


 


Hgb    10.4 L  (13.0-17.5)  gm/dL


 


Hct    32.1 L  (39.0-53.0)  %


 


Plt Count    131 L  (150-450)  k/uL


 


Lymphocytes #    0.4 L  (1.0-4.8)  k/uL


 


BUN     (9-20)  mg/dL


 


Glucose     (74-99)  mg/dL


 


POC Glucose (mg/dL)  217 H  270 H   (75-99)  mg/dL


 


Calcium     (8.4-10.2)  mg/dL


 


AST     (17-59)  U/L


 


Alkaline Phosphatase     ()  U/L


 


Total Protein     (6.3-8.2)  g/dL


 


Albumin     (3.5-5.0)  g/dL














  03/05/20 03/05/20 03/05/20 Range/Units





  06:20 07:17 11:48 


 


RBC     (4.30-5.90)  m/uL


 


Hgb     (13.0-17.5)  gm/dL


 


Hct     (39.0-53.0)  %


 


Plt Count     (150-450)  k/uL


 


Lymphocytes #     (1.0-4.8)  k/uL


 


BUN  22 H    (9-20)  mg/dL


 


Glucose  157 H    (74-99)  mg/dL


 


POC Glucose (mg/dL)   168 H  158 H  (75-99)  mg/dL


 


Calcium  8.3 L    (8.4-10.2)  mg/dL


 


AST  16 L    (17-59)  U/L


 


Alkaline Phosphatase  144 H    ()  U/L


 


Total Protein  5.9 L    (6.3-8.2)  g/dL


 


Albumin  2.7 L    (3.5-5.0)  g/dL








                      Microbiology - Last 24 Hours (Table)











 03/03/20 17:31 Blood Culture - Preliminary





 Blood    No Growth after 24 hours














Assessment and Plan


Plan: 





FINAL ASSESSMENT AND PLAN: 


#1 non-ST elevated myocardial infarction


#2 congestive heart failure diastolic acute on chronic


#3 hypertension


#4 worsening shortness of breath, VQ scan negative


#5 diabetes mellitus


#6 chronic kidney disease, stage III


#7 hyperlipidemia








Plan


From cardiology's perspective, we'll continue the patient on his current 

medications.  He is currently on oral diuretics.  Plan for possible discharge 

home soon.


DNP note has been reviewed, I agree with a documented findings and plan of care.

 Patient was seen and examined.

## 2020-03-05 NOTE — P.CONS
History of Present Illness





- Reason for Consult


Consult date: 03/05/20


Possible colon mass, anemia, thrombocytopenia





- History of Present Illness





 The patient is a 74-year-old  male, with multiple medical problems.  

The patient was admitted with multiple complaints including lethargy, decreased 

appetite, episodes of confusion since weakness be on over a period of few days. 

He was ultimately found to have elevated troponin, as well as a UTI with culture

positive for Morganella.  The patient was also subsequently found to have blood 

cultures positive for gram-positive bacilli.


  he was also noted to have elevated troponins, and on cardiology evaluation was

felt to likely have coronary artery disease.  He was placed on IV heparin.  

Initially there was a plan for cardiac catheterization according to my 

discussion with cardiology.  However subsequently it was decided to manage him 

medically


  He is on antibiotics being followed by ID.  2 workup source of infection, CT 

of the abdomen and pelvis was ordered.  This did not show any obvious source, 

but incidentally noted a possible mass in the distal colon, related segment of 

colon proximal to it.  Consult was therefore placed for further evaluation and 

recommendations.


  There is no prior history of malignancy.  The patient had colonoscopy done in 

1/20 with multiple polyps removed.  The largest polyp however was 1.5 cm in the 

distal colon.  Pathology was positive only for tubular adenomas.  There was no 

evidence of any significant mass that could cause CT abnormality noted during 

this admission.


  Incidentally EGD did show evidence of small esophageal and gastric varices 

leading to suspicion of possible chronic liver disease


  The patient was also noted to have anemia this admission with hemoglobin most

ly in the 10 range.  Hemoglobin has been in the 10-11 range, going back to at 

least 2018.  Iron studies have showed low saturation with normal ferritin, but 

ferritin level has been less than 100.  As on admission were normal but 

subsequently fell into the low 100 range with subsequent recovery to 131.


  Patient's recall was quite poor at the time of evaluation and history was 

mostly obtained from his son.  He denied any prior history of malignancy or 

known blood problems.  He also denied any history of heavy alcohol use. 





Review of Systems


Constitutional: Reports fatigue, Reports poor appetite, Reports weakness, 

Reports weight loss


Eyes: denies blurred vision, denies pain


Ears: deny: decreased hearing, ear discharge, earache, tinnitus


Ears, nose, mouth and throat: Denies headache, Denies sore throat


Cardiovascular: Reports decreased exercise tolerance, Reports dyspnea on 

exertion


Respiratory: Denies cough


Gastrointestinal: Reports hematochezia (mild, intermittent), Denies abdominal p

ain, Denies diarrhea, Denies nausea, Denies vomiting


Genitourinary: Reports as per HPI


Musculoskeletal: Reports muscle weakness


Integumentary: Denies pruritus, Denies rash


Neurological: Reports confusion, Reports weakness


Psychiatric: Reports confusion, Reports difficulty concentrating


Endocrine: Reports high blood sugars


Hematologic/Lymphatic: Reports as per HPI


Allergic/Immunologic: Reports as per HPI





Past Medical History


Past Medical History: Chest Pain / Angina, Heart Failure, CVA/TIA, Diabetes 

Mellitus, Hyperlipidemia, Hypertension, Osteoarthritis (OA)


Additional Past Medical History / Comment(s): incontinent of stools int

ermittently. low iron levels, possible GI bleed-dark tarry stools per son, Son 

stated "has had recent falls related to blood sugar going low 60s"-HX 2017 and 

2018 had episodes of CHF approx 7-10 days post receiving flu vaccine,TIA,Type2 

IDDM


History of Any Multi-Drug Resistant Organisms: None Reported


Past Surgical History: Hernia Repair


Additional Past Surgical History / Comment(s): 9/11/18 robot assisted 

laprascopic umbilical hernia repair with mesh., 6/10/19 repair recurrent 

incarerated hernia


Past Anesthesia/Blood Transfusion Reactions: No Reported Reaction


Additional Past Anesthesia/Blood Transfusion Reaction / Comm: never had 

anesthesia


Past Psychological History: Depression


Smoking Status: Current every day smoker


Past Alcohol Use History: None Reported


Past Drug Use History: None Reported





- Past Family History


  ** Mother


Family Medical History: Cancer


Additional Family Medical History / Comment(s): breast cancer





  ** Father


Family Medical History: Unable to Obtain





  ** Sister(s)


Family Medical History: Cancer





  ** Brother(s)


Family Medical History: Diabetes Mellitus





Medications and Allergies


                                Home Medications











 Medication  Instructions  Recorded  Confirmed  Type


 


Benazepril HCl 40 mg PO QAM 12/03/17 03/01/20 History


 


Aspirin 81 mg PO DAILY 12/04/17 03/01/20 History


 


Tamsulosin [Flomax] 0.4 mg PO HS 12/04/17 03/01/20 History


 


Carvedilol [Coreg] 6.25 mg PO BID 09/05/18 03/01/20 History


 


Isosorbide Mononitrate ER [Imdur] 30 mg PO QAM 09/05/18 03/01/20 History


 


Lovastatin [Mevacor] 20 mg PO HS 09/05/18 03/01/20 History


 


Spironolactone [Aldactone] 12.5 mg PO QAM 09/05/18 03/01/20 History


 


Ferrous Sulfate [Iron (65  mg PO DAILY #30 tab 12/03/18 03/01/20 Rx





Elemental)]    


 


Furosemide [Lasix] 20 mg PO DAILY 12/26/19 03/01/20 History


 


Insulin Glargine,Hum.rec.anlog 40 unit SQ QAM 01/08/20 03/01/20 History





[Basaglar Kwikpen U-100]    








                                    Allergies











Allergy/AdvReac Type Severity Reaction Status Date / Time


 


No Known Allergies Allergy   Verified 03/01/20 11:12














Physical Exam


Vitals: 


                                   Vital Signs











  Temp Pulse Pulse Resp BP Pulse Ox


 


 03/05/20 21:00   72    


 


 03/05/20 20:48   75   16  


 


 03/05/20 16:41   64    


 


 03/05/20 16:28   60     97


 


 03/05/20 16:15  98.4 F   60  20  144/68  94 L


 


 03/05/20 14:45    64  18  


 


 03/05/20 12:00  98.0 F   64  18  156/78  93 L


 


 03/05/20 11:50   70    


 


 03/05/20 11:40   68    


 


 03/05/20 08:20   67    


 


 03/05/20 08:10   66    


 


 03/05/20 08:00  98.1 F   79  20  151/69  95


 


 03/05/20 03:51  98.2 F   74  16  160/69  97


 


 03/05/20 00:28  98.5 F   69  18  150/68  98








                                Intake and Output











 03/05/20 03/05/20 03/05/20





 06:59 14:59 22:59


 


Intake Total  240 


 


Output Total 500  


 


Balance -500 240 


 


Intake:   


 


  Oral  240 


 


Output:   


 


  Urine 500  


 


Other:   


 


  Voiding Method Urinal Toilet 


 


  # Voids  2 2


 


  # Bowel Movements   1


 


  Weight 102.7 kg  














- Constitutional





lethargic, mildly confused.  Poor recall


General appearance: morbidly obese, no acute distress





- EENT


Eyes: EOMI, PERRLA


ENT: hearing grossly normal, normal oropharynx





- Neck


Neck: no lymphadenopathy


Thyroid: bilateral: normal size





- Respiratory


Respiratory: bilateral: CTA





- Cardiovascular


Rhythm: regular


Heart sounds: normal: S1, S2





- Gastrointestinal


General gastrointestinal: normal bowel sounds, soft





- Integumentary


Integumentary: normal





- Neurologic





mental status as noted above


Neurologic: CNII-XII intact





- Musculoskeletal


Musculoskeletal: generalized weakness, strength equal bilaterally





- Psychiatric





mental status as noted above





Results


CBC & Chem 7: 


                                 03/05/20 06:20





                                 03/05/20 06:20


Labs: 


                  Abnormal Lab Results - Last 24 Hours (Table)











  03/05/20 03/05/20 03/05/20 Range/Units





  06:20 06:20 07:17 


 


RBC  3.78 L    (4.30-5.90)  m/uL


 


Hgb  10.4 L    (13.0-17.5)  gm/dL


 


Hct  32.1 L    (39.0-53.0)  %


 


Plt Count  131 L    (150-450)  k/uL


 


Lymphocytes #  0.4 L    (1.0-4.8)  k/uL


 


BUN   22 H   (9-20)  mg/dL


 


Glucose   157 H   (74-99)  mg/dL


 


POC Glucose (mg/dL)    168 H  (75-99)  mg/dL


 


Calcium   8.3 L   (8.4-10.2)  mg/dL


 


AST   16 L   (17-59)  U/L


 


Alkaline Phosphatase   144 H   ()  U/L


 


Total Protein   5.9 L   (6.3-8.2)  g/dL


 


Albumin   2.7 L   (3.5-5.0)  g/dL














  03/05/20 03/05/20 Range/Units





  11:48 17:23 


 


RBC    (4.30-5.90)  m/uL


 


Hgb    (13.0-17.5)  gm/dL


 


Hct    (39.0-53.0)  %


 


Plt Count    (150-450)  k/uL


 


Lymphocytes #    (1.0-4.8)  k/uL


 


BUN    (9-20)  mg/dL


 


Glucose    (74-99)  mg/dL


 


POC Glucose (mg/dL)  158 H  129 H  (75-99)  mg/dL


 


Calcium    (8.4-10.2)  mg/dL


 


AST    (17-59)  U/L


 


Alkaline Phosphatase    ()  U/L


 


Total Protein    (6.3-8.2)  g/dL


 


Albumin    (3.5-5.0)  g/dL








                      Microbiology - Last 24 Hours (Table)











 03/03/20 17:31 Blood Culture - Preliminary





 Blood    No Growth after 48 hours











Comments: 





VQ scan report, echo report, procedure note, and path reports reviewed and 

summarized above


Chest x-ray: report reviewed


CT scan - abdomen: report reviewed


CT scan - pelvis: report reviewed





Assessment and Plan


(1) Colonic mass


Narrative/Plan: 


  The patient's current CAT scan notes possible colonic mass in the distal colon

actually causing validation of the segment proximal to it.  The finding is quite

surprising given the very recent colonoscopy with no evidence of a mass of that 

dimension noted.  There has been no change in symptoms.


 At this time it is possible that the CT findings are artifactual, due to 

redundant colon for example.  Case was discussed with the admitting service, and

it was recommended that GI be consulted to evaluate the patient lives with a 

flexible sigmoidoscopy.  If a mass is confirmed, then it can be biopsied with 

further recommendations according to those results.  If ruled out, then 

obviously no further workup would be required


Current Visit: Yes   Status: Acute   Code(s): K63.89 - OTHER SPECIFIED DISEASES 

OF INTESTINE   SNOMED Code(s): 508515832


   





(2) Chronic anemia


Narrative/Plan: 


 The patient's anemia is mild and overall stable.  Iron studies do indicate 

possible iron deficiency, with low saturation and ferritin less than 100.  EGD 

did note small gastric and esophageal varices.  Therefore a likely etiology is 

decreased absorption due to her degree of portal gastropathy, as well as chronic

low-volume blood losses from the varices, as well as possibly small bowel AVMs, 

which are more common with chronic liver disease.


 Workup for other causes of anemia will be ordered.


 I would recommend starting the patient on iron supplementation, with regular 

outpatient follow-up, with utilization of IV iron if oral is not effective.


Current Visit: Yes   Status: Acute   Code(s): D64.9 - ANEMIA, UNSPECIFIED   

SNOMED Code(s): 902149317


   





(3) Thrombocytopenia


Narrative/Plan: 


 Thrombocytopenia is actually a new finding during this admission.  Platelet 

count was normal on admission and subsequently declined but then showing signs 

of recovery.  The patient may have some impairment of platelet production, 

and/or degree of increased destruction, assuming he has chronic liver disease 

and portal hypertension.  However even if that is present, this is likely very 

mild given the platelet count at baseline are normal.  Drop in counts during 

this admission is likely due to his UTI and bacteremia.  As counts have remained

well within a safe range, no intervention is required


  Counts are well above the limit of 50,000, required for anticoagulation and/or

antiplatelet therapy, if this would be recommended by cardiology


 Workup for other causes of  thrombocytopenia  will be ordered, which will 

overlap with workup for other causes of anemia.


  


Current Visit: Yes   Status: Acute   Code(s): D69.6 - THROMBOCYTOPENIA, 

UNSPECIFIED   SNOMED Code(s): 427318211


   


Plan: 





 Defer to the admitting service and other consultants for management of his 

other medical problems.  As noted, there is no contraindication to any invasive 

procedure, anticoagulation or antiplatelet therapy if recommended by cardiology,

from the hematology standpoint as long as his platelets are greater than 50,000

## 2020-03-05 NOTE — P.PN
Subjective


Progress Note Date: 03/05/20





Patient is a 74-year-old male who presented to [facility] 

emergency room via EMS with multiple vague symptoms including confusion and 

mental status changes, episodes of shortness of breath he was evaluated in the 

emergency room, he had evidence of urinary tract infection and evidence of e

levated troponin level, he also had evidence of dehydration was elevated BUN and

creatinine he was admitted to telemetry floor for further evaluation and 

cardiology consultation was requested.  Patient was seen and examined on the 

telemetry floor he is alert and oriented 3 in no apparent distress, he denies 

any chest pain or shortness of breath there is no fever or chills no headache or

dizziness no nausea or vomiting no abdominal pain no diarrhea no burning was 

urination no frequency or urgency and no hematuria








On 03/02/2020 patient is alert and oriented 3.  2-D echo completed awaiting to 

be red.  Patient remains on IV Lasix and IV heparin.  Cardiology services are 

following.  Repeat chest x-ray has been ordered due to wheezing is auscultation.

 DuoNeb breathing treatments ordered creatinine is improving at 1.32 and bun 33.

 At this time patient denies chest pain.  Patient is any nausea vomiting or 

diarrhea.  Patient denies any urinary burning or frequency





On 03/03/2020 patient is alert and oriented 3.  2-D echo completed showing an 

EF of 45-50%.  Patient remains on IV Lasix per cardiology services.  Creatinine 

improving to 1.12 and bun 28.  Did discuss case with cardiology nurse 

practitioner Dr. Tellez possible plans for heart cath will discuss with Dr. Crooks.  At this time patient remains on heparin drip.  Wheeziness has improved.

 Patient is maintained on DuoNeb breathing treatments.  No active 

cardiopulmonary disease.  No change.  Patient denies chest pain or shortness of 

breath.  Patient denies nausea vomiting or diarrhea.  Patient denies any urinary

burning or frequency





On 03/04/2020 patient is alert and oriented 3 family at bedside.  Patient 

having positive blood culture growing gram-negative bacilli.  Infectious disease

has been consulted and patient started on Flagyl and Rocephin.  No plans for 

cardiac catheterization at this time per cardiology CT of abdomen and pelvis 

completed per infectious disease ordered.  Concerns for possible constricting le

rain and tumor.  Oncology services will be consulted.  Patient wife at bedside 

reports that he did have a colonoscopy in January with Dr. Griffin and and was 

recommended after that colonoscopy to obtain a CT of abdomen and pelvis but 

patient declined.  At this time patient denies any chest pain or shortness of 

breath.  Patient denies nausea vomiting or diarrhea.  Patient denies any urinary

burning or frequency.





On 03/05/2020 patient is alert and oriented 3.  Dr. lugo consulted for CT 

resulted discussed case with Dr. thornton recommending GI consult due to recent 

colonoscopy.  Patient remains on Rocephin and Flagyl repeat blood culture 

pending.  Cardiology, infectious disease, oncology and GI services following.  

Patient denies chest pain or shortness of breath.  Patient denies nausea 

vomiting or diarrhea.  Patient denies any urinary burning or frequency.  Patient

has been transitioned to oral Lasix per cardiology 





Objective





- Vital Signs


Vital signs: 


                                   Vital Signs











Temp  98.1 F   03/05/20 08:00


 


Pulse  67   03/05/20 08:20


 


Resp  20   03/05/20 08:00


 


BP  151/69   03/05/20 08:00


 


Pulse Ox  95   03/05/20 08:00








                                 Intake & Output











 03/04/20 03/05/20 03/05/20





 18:59 06:59 18:59


 


Intake Total 947.496 240 240


 


Output Total 1100 500 


 


Balance -152.504 -260 240


 


Weight  102.7 kg 


 


Intake:   


 


  IV 20  


 


    Invasive Line 3 20  


 


  Intake, IV Titration 47.496  





  Amount   


 


    Heparin Sod,Pork in 0.45% 47.496  





    NaCl 25,000 unit In 0.45   





    % NaCl 1 250ml.bag @ 9.76   





    UNITS/KG/HR 10.005 mls/   





    hr IV .Q24H SOURAV Rx#:   





    [id number]   


 


  Oral 880 240 240


 


Output:   


 


  Urine 1100 500 


 


Other:   


 


  Voiding Method Urinal Urinal 


 


  # Voids 1  


 


  # Bowel Movements 1  














- Exam





In general patient is alert and oriented 3 in no apparent distress


HEENT head normocephalic and atraumatic


Neck is supple no JVD no goiter no lymphadenopathy


Chest exam reveals a few scattered rhonchi no wheezing


Cardiac exam reveals regular heart sounds no gallops no murmurs


Abdomen is soft nontender no organomegaly


Extremity exam reveals no edema no cyanosis or clubbing


Neurological examination reveals no gross focal deficits





- Labs


CBC & Chem 7: 


                                 03/05/20 06:20





                                 03/05/20 06:20


Labs: 


                  Abnormal Lab Results - Last 24 Hours (Table)











  03/04/20 03/04/20 03/04/20 Range/Units





  11:55 16:56 20:34 


 


RBC     (4.30-5.90)  m/uL


 


Hgb     (13.0-17.5)  gm/dL


 


Hct     (39.0-53.0)  %


 


Plt Count     (150-450)  k/uL


 


Lymphocytes #     (1.0-4.8)  k/uL


 


BUN     (9-20)  mg/dL


 


Glucose     (74-99)  mg/dL


 


POC Glucose (mg/dL)  188 H  217 H  270 H  (75-99)  mg/dL


 


Calcium     (8.4-10.2)  mg/dL


 


AST     (17-59)  U/L


 


Alkaline Phosphatase     ()  U/L


 


Total Protein     (6.3-8.2)  g/dL


 


Albumin     (3.5-5.0)  g/dL














  03/05/20 03/05/20 03/05/20 Range/Units





  06:20 06:20 07:17 


 


RBC  3.78 L    (4.30-5.90)  m/uL


 


Hgb  10.4 L    (13.0-17.5)  gm/dL


 


Hct  32.1 L    (39.0-53.0)  %


 


Plt Count  131 L    (150-450)  k/uL


 


Lymphocytes #  0.4 L    (1.0-4.8)  k/uL


 


BUN   22 H   (9-20)  mg/dL


 


Glucose   157 H   (74-99)  mg/dL


 


POC Glucose (mg/dL)    168 H  (75-99)  mg/dL


 


Calcium   8.3 L   (8.4-10.2)  mg/dL


 


AST   16 L   (17-59)  U/L


 


Alkaline Phosphatase   144 H   ()  U/L


 


Total Protein   5.9 L   (6.3-8.2)  g/dL


 


Albumin   2.7 L   (3.5-5.0)  g/dL








                      Microbiology - Last 24 Hours (Table)











 03/03/20 17:31 Blood Culture - Preliminary





 Blood    No Growth after 24 hours














Assessment and Plan


Assessment: 


1. shortness of breath likely related to congestive heart failure and possibly 

related to ischemic heart disease, VQ scan was done and was negative. 





2.  non-ST elevation myocardial infarction.  Patient remains on IV heparin.  At 

this time no plans for cardiac catheterization continue medical management





3.  evidence of dehydration was elevated BUN and creatinine.  Creatinine 

improving to 1.3 to and bun 33.  This does appear baseline for patient.  

Creatinine improving to 1.12 and bun 28





4.  Acute on chronic diastolic congestive heart failure.  2-D echo completed 

showing an EF of 45-50%  BNP 10,600. Patient currently on IV Lasix.  Discussed 

case with cardiology patient will continue current dose of IV Lasix for 24 

hours.  IV Lasix has been changed to Lasix 20 mg twice a day





5.  evidence of urinary tract infection.  Patient asymptomatic.  Urine culture 

negative.  Urine culture showing no growth.  Patient is currently maintained on 

Rocephin





6.  underlying history of chronic kidney disease stage II





7.  underlying history of diabetes mellitus type II.  Home meds resumed





8.  Increased wheeziness.  Chest x-ray and DuoNeb breathing.  Chest x-ray 

completed showing no active cardiopulmonary disease.  No change.  Wheeziness has

resolved





9.  Positive blood culture with gram positive bacilli.  Infectious disease 

consulted she started on Rocephin and Flagyl repeat blood culture ordered





10.  Abnormal CT of abdomen and pelvis.  CT of abdomen and pelvis completed 

showing locally dilated proximal sigmoid colon with irregular wall thickening 

suggestive a constricting lesion in tumor.  Per patients without bedside patient

did have colonoscopy in January with Dr. Griffin.  Oncology services will be 

consulted at this time to further investigate.  Per oncology service is 

recommending GI consult.





DVT prophylaxis heparin.  GI prophylaxis Pepcid


Cardiology, infectious disease and oncology services consulted


Patient currently on Rocephin and Flagyl


Repeat cultures ordered














I performed an examination of the patient and discussed their management with 

the Nurse Practitioner.  I have reviewed the Nurse Practitioner's notes and 

agree with the documented findings and plan of care

## 2020-03-06 VITALS — RESPIRATION RATE: 18 BRPM

## 2020-03-06 LAB
ALBUMIN SERPL-MCNC: 2.6 G/DL (ref 3.5–5)
ALP SERPL-CCNC: 120 U/L (ref 38–126)
ALT SERPL-CCNC: 11 U/L (ref 4–49)
ANION GAP SERPL CALC-SCNC: 6 MMOL/L
AST SERPL-CCNC: 18 U/L (ref 17–59)
BASOPHILS # BLD AUTO: 0 K/UL (ref 0–0.2)
BASOPHILS NFR BLD AUTO: 1 %
BUN SERPL-SCNC: 16 MG/DL (ref 9–20)
CALCIUM SPEC-MCNC: 8.1 MG/DL (ref 8.4–10.2)
CHLORIDE SERPL-SCNC: 104 MMOL/L (ref 98–107)
CO2 SERPL-SCNC: 27 MMOL/L (ref 22–30)
EOSINOPHIL # BLD AUTO: 0.1 K/UL (ref 0–0.7)
EOSINOPHIL NFR BLD AUTO: 2 %
ERYTHROCYTE [DISTWIDTH] IN BLOOD BY AUTOMATED COUNT: 3.7 M/UL (ref 4.3–5.9)
ERYTHROCYTE [DISTWIDTH] IN BLOOD: 14.8 % (ref 11.5–15.5)
GLUCOSE BLD-MCNC: 106 MG/DL (ref 75–99)
GLUCOSE BLD-MCNC: 143 MG/DL (ref 75–99)
GLUCOSE BLD-MCNC: 279 MG/DL (ref 75–99)
GLUCOSE BLD-MCNC: 74 MG/DL (ref 75–99)
GLUCOSE SERPL-MCNC: 71 MG/DL (ref 74–99)
HCT VFR BLD AUTO: 31.1 % (ref 39–53)
HGB BLD-MCNC: 10 GM/DL (ref 13–17.5)
LYMPHOCYTES # SPEC AUTO: 0.9 K/UL (ref 1–4.8)
LYMPHOCYTES NFR SPEC AUTO: 18 %
MCH RBC QN AUTO: 27 PG (ref 25–35)
MCHC RBC AUTO-ENTMCNC: 32.1 G/DL (ref 31–37)
MCV RBC AUTO: 83.9 FL (ref 80–100)
MONOCYTES # BLD AUTO: 0.5 K/UL (ref 0–1)
MONOCYTES NFR BLD AUTO: 10 %
NEUTROPHILS # BLD AUTO: 3.2 K/UL (ref 1.3–7.7)
NEUTROPHILS NFR BLD AUTO: 67 %
PLATELET # BLD AUTO: 153 K/UL (ref 150–450)
POTASSIUM SERPL-SCNC: 3.8 MMOL/L (ref 3.5–5.1)
PROT SERPL-MCNC: 5.8 G/DL (ref 6.3–8.2)
RHEUMATOID FACT SERPL-ACNC: 8 IU/ML (ref 0–15)
SODIUM SERPL-SCNC: 137 MMOL/L (ref 137–145)
VIT B12 SERPL-MCNC: 238 PG/ML (ref 200–944)
WBC # BLD AUTO: 4.8 K/UL (ref 3.8–10.6)

## 2020-03-06 PROCEDURE — 0DJD8ZZ INSPECTION OF LOWER INTESTINAL TRACT, VIA NATURAL OR ARTIFICIAL OPENING ENDOSCOPIC: ICD-10-PCS

## 2020-03-06 RX ADMIN — TAMSULOSIN HYDROCHLORIDE SCH MG: 0.4 CAPSULE ORAL at 20:22

## 2020-03-06 RX ADMIN — IPRATROPIUM BROMIDE AND ALBUTEROL SULFATE SCH ML: .5; 3 SOLUTION RESPIRATORY (INHALATION) at 07:07

## 2020-03-06 RX ADMIN — INSULIN ASPART SCH: 100 INJECTION, SOLUTION INTRAVENOUS; SUBCUTANEOUS at 06:14

## 2020-03-06 RX ADMIN — ISOSORBIDE MONONITRATE SCH MG: 30 TABLET, EXTENDED RELEASE ORAL at 16:25

## 2020-03-06 RX ADMIN — Medication SCH MG: at 16:25

## 2020-03-06 RX ADMIN — INSULIN ASPART SCH UNIT: 100 INJECTION, SOLUTION INTRAVENOUS; SUBCUTANEOUS at 21:17

## 2020-03-06 RX ADMIN — INSULIN ASPART SCH: 100 INJECTION, SOLUTION INTRAVENOUS; SUBCUTANEOUS at 12:51

## 2020-03-06 RX ADMIN — ATORVASTATIN CALCIUM SCH MG: 80 TABLET, FILM COATED ORAL at 16:25

## 2020-03-06 RX ADMIN — IPRATROPIUM BROMIDE AND ALBUTEROL SULFATE SCH ML: .5; 3 SOLUTION RESPIRATORY (INHALATION) at 10:59

## 2020-03-06 RX ADMIN — HEPARIN SODIUM SCH UNIT: 5000 INJECTION, SOLUTION INTRAVENOUS; SUBCUTANEOUS at 20:22

## 2020-03-06 RX ADMIN — IPRATROPIUM BROMIDE AND ALBUTEROL SULFATE SCH: .5; 3 SOLUTION RESPIRATORY (INHALATION) at 20:13

## 2020-03-06 RX ADMIN — FAMOTIDINE SCH MG: 20 TABLET, FILM COATED ORAL at 16:24

## 2020-03-06 RX ADMIN — FUROSEMIDE SCH MG: 20 TABLET ORAL at 16:25

## 2020-03-06 RX ADMIN — CARVEDILOL SCH: 6.25 TABLET, FILM COATED ORAL at 16:18

## 2020-03-06 RX ADMIN — HEPARIN SODIUM SCH UNIT: 5000 INJECTION, SOLUTION INTRAVENOUS; SUBCUTANEOUS at 08:08

## 2020-03-06 RX ADMIN — CARVEDILOL SCH MG: 6.25 TABLET, FILM COATED ORAL at 20:22

## 2020-03-06 RX ADMIN — SPIRONOLACTONE SCH MG: 25 TABLET, FILM COATED ORAL at 16:24

## 2020-03-06 RX ADMIN — INSULIN DETEMIR SCH: 100 INJECTION, SOLUTION SUBCUTANEOUS at 06:14

## 2020-03-06 RX ADMIN — INSULIN ASPART SCH UNIT: 100 INJECTION, SOLUTION INTRAVENOUS; SUBCUTANEOUS at 17:37

## 2020-03-06 RX ADMIN — LISINOPRIL SCH MG: 20 TABLET ORAL at 16:24

## 2020-03-06 RX ADMIN — ASPIRIN 81 MG CHEWABLE TABLET SCH MG: 81 TABLET CHEWABLE at 16:24

## 2020-03-06 RX ADMIN — IPRATROPIUM BROMIDE AND ALBUTEROL SULFATE SCH ML: .5; 3 SOLUTION RESPIRATORY (INHALATION) at 16:33

## 2020-03-06 RX ADMIN — FUROSEMIDE SCH MG: 20 TABLET ORAL at 20:22

## 2020-03-06 NOTE — PN
PROGRESS NOTE



DATE OF SERVICE:

03/06/2020



REASON FOR FOLLOWUP:

1. Positive blood culture.

2. Colitis.



INTERVAL HISTORY:

The patient is currently afebrile, has been breathing comfortably.  The patient denies

having any chest pain, shortness or breath or cough. No nausea, no vomiting.  No

abdominal pain or diarrhea.



PHYSICAL EXAMINATION:

Blood pressure 171/72 with a pulse of 70, temperature 98.4.  He is 93% on room air.

General description is an elderly male up in the chair in no distress.

RESPIRATORY SYSTEM: Unlabored breathing. Clear to auscultation anteriorly.

HEART: S1, S2.  Regular rate and rhythm.

ABDOMEN: Soft. No tenderness.



LABS:

Hemoglobin is 10, white count 4.8.  BUN of 16, creatinine 0.90.  Blood culture initial

has not been identified.  Repeat has been negative so far.



DIAGNOSTIC IMPRESSION AND PLAN:

1. Patient with positive blood culture with Gram-positive bacilli, more likely

    pointing to skin contamination, as he did have repeat blood cultures before

    receiving antibiotic. Those are negative.  Still waiting for the final ID of that

    pathogen.

2. Patient with colitis with possible constricting colon mass, for colonoscopy today.

    Patient is covered with Rocephin and Flagyl.

Son was at the bedside.  He had multiple questions. Those were answered in layman's

terms.





MMODL / IJN: [id number] / Job#: [id number]

## 2020-03-06 NOTE — P.CONS
History of Present Illness





- Reason for Consult


Consult date: 03/05/20


Abnormal CT abdomen


Requesting physician: Marlin Tafoya





- Chief Complaint


Weakness





- History of Present Illness





74-year-old male with multiple medical comorbidities including hypertension, 

osteoarthritis, hyperlipidemia, congestive heart failure, obesity, colonic 

polyps colonoscopy and gastric and esophageal varices on EGD who presented to 

the hospital with complaints of lethargy.  At that time the patient had been 

complaining of weakness, decreased appetite and episodes of confusion.  He was 

found to have elevation in his troponins and is currently being medically ma

naged by the cardiology service.  In addition the patient is also receiving 

antibiotic therapy for a urinary tract infection.  He had a computed tomography 

scan of the abdomen performed in evaluation of his symptoms with findings 

concerning for a sigmoid mass.  However on review of the electronic medical 

record the patient had EGD and colonoscopy performed in 01/2020 with EGD showing

findings of esophageal and gastric varices and colonoscopy with polypectomy as 

stated.  The prep for the colonoscopy was deemed fair.  He also has a history of

chronic anemia and given the above the hematology/oncology service has been 

consult.








Review of Systems





REVIEW OF SYSTEMS:


CONSTITUTIONAL: Denies any fevers, chills, but he does report fatigue and 

lethargy.


CARDIOVASCULAR: Denies any chest pain, palpitations high or low blood pressures


RESPIRATORY: Denies any shortness of breath, hemoptysis or cough.  


GENITOURINARY:  No dysuria or hematuria, currently receiving treatment for 

urinary tract infection. 


MUSCULOSKELETAL: No weakness reported. 


SKIN: Denies any new rashes or lesions, jaundice or pallor. 


PSYCHIATRIC: Denies any depression or anxiety. 


NEUROLOGY: Denies headache, denies any new focal deficits. 


EARS/NOSE/THROAT: No recent hearing change, congestion, nasal discharge or sore 

throat.


EYES: No pain in eyes, discharge or change in vision. 


GASTROINTESTINAL: As per HPI.





Past Medical History


Past Medical History: Chest Pain / Angina, Heart Failure, CVA/TIA, Diabetes 

Mellitus, Hyperlipidemia, Hypertension, Osteoarthritis (OA)


Additional Past Medical History / Comment(s): incontinent of stools 

intermittently. low iron levels, possible GI bleed-dark tarry stools per son, 

Son stated "has had recent falls related to blood sugar going low 60s"-HX 2017 

and 2018 had episodes of CHF approx 7-10 days post receiving flu 

vaccine,TIA,Type2 IDDM


History of Any Multi-Drug Resistant Organisms: None Reported


Past Surgical History: Hernia Repair


Additional Past Surgical History / Comment(s): 9/11/18 robot assisted 

laprascopic umbilical hernia repair with mesh., 6/10/19 repair recurrent inca

rerated hernia


Past Anesthesia/Blood Transfusion Reactions: No Reported Reaction


Additional Past Anesthesia/Blood Transfusion Reaction / Comm: never had 

anesthesia


Past Psychological History: Depression


Smoking Status: Current every day smoker


Past Alcohol Use History: None Reported


Past Drug Use History: None Reported





- Past Family History


  ** Mother


Family Medical History: Cancer


Additional Family Medical History / Comment(s): breast cancer





  ** Father


Family Medical History: Unable to Obtain





  ** Sister(s)


Family Medical History: Cancer





  ** Brother(s)


Family Medical History: Diabetes Mellitus





Medications and Allergies


                                Home Medications











 Medication  Instructions  Recorded  Confirmed  Type


 


Benazepril HCl 40 mg PO QAM 12/03/17 03/01/20 History


 


Aspirin 81 mg PO DAILY 12/04/17 03/01/20 History


 


Tamsulosin [Flomax] 0.4 mg PO HS 12/04/17 03/01/20 History


 


Carvedilol [Coreg] 6.25 mg PO BID 09/05/18 03/01/20 History


 


Isosorbide Mononitrate ER [Imdur] 30 mg PO QAM 09/05/18 03/01/20 History


 


Lovastatin [Mevacor] 20 mg PO HS 09/05/18 03/01/20 History


 


Spironolactone [Aldactone] 12.5 mg PO QAM 09/05/18 03/01/20 History


 


Ferrous Sulfate [Iron (65  mg PO DAILY #30 tab 12/03/18 03/01/20 Rx





Elemental)]    


 


Furosemide [Lasix] 20 mg PO DAILY 12/26/19 03/01/20 History


 


Insulin Glargine,Hum.rec.anlog 40 unit SQ QAM 01/08/20 03/01/20 History





[Basaglar Kwikpen U-100]    








                                    Allergies











Allergy/AdvReac Type Severity Reaction Status Date / Time


 


No Known Allergies Allergy   Verified 03/01/20 11:12














Physical Exam


Vitals: 


                                   Vital Signs











  Temp Pulse Pulse Resp BP Pulse Ox


 


 03/05/20 14:45    64  18  


 


 03/05/20 12:00  98.0 F   64  18  156/78  93 L


 


 03/05/20 11:50   70    


 


 03/05/20 11:40   68    


 


 03/05/20 08:20   67    


 


 03/05/20 08:10   66    


 


 03/05/20 08:00  98.1 F   79  20  151/69  95


 


 03/05/20 03:51  98.2 F   74  16  160/69  97


 


 03/05/20 00:28  98.5 F   69  18  150/68  98


 


 03/04/20 20:00     16  


 


 03/04/20 16:51   70    


 


 03/04/20 16:40   72    


 


 03/04/20 15:34    68  16  


 


 03/04/20 15:31  98.2 F   68  16  153/69  95








                                Intake and Output











 03/05/20 03/05/20 03/05/20





 06:59 14:59 22:59


 


Intake Total  240 


 


Output Total 500  


 


Balance -500 240 


 


Intake:   


 


  Oral  240 


 


Output:   


 


  Urine 500  


 


Other:   


 


  Voiding Method Urinal Toilet 


 


  Weight 102.7 kg  














On physical examination, patient appears comfortable in no apparent distress. 


HEAD: Normocephalic, atraumatic. 


EYES: No scleral icterus. No conjunctival injection. 


MOUTH: No lesions, tongue midline. 


NECK: Trachea midline, no gross abnormalities. 


CHEST: Decreased air entry in all lung fields. 


HEART: S1-S2 appreciated. 


ABDOMEN: Soft, obese. Bowel sounds are positive. No organomegaly.  No guarding 

or rigidity.


EXTREMITIES: No pedal edema. 


SKIN: No rashes, no jaundice. 


NEUROLOGIC: Alert and oriented to person and place.  No focal deficits. 





Results


CBC & Chem 7: 


                                 03/05/20 06:20





                                 03/05/20 06:20


Labs: 


                  Abnormal Lab Results - Last 24 Hours (Table)











  03/04/20 03/04/20 03/05/20 Range/Units





  16:56 20:34 06:20 


 


RBC    3.78 L  (4.30-5.90)  m/uL


 


Hgb    10.4 L  (13.0-17.5)  gm/dL


 


Hct    32.1 L  (39.0-53.0)  %


 


Plt Count    131 L  (150-450)  k/uL


 


Lymphocytes #    0.4 L  (1.0-4.8)  k/uL


 


BUN     (9-20)  mg/dL


 


Glucose     (74-99)  mg/dL


 


POC Glucose (mg/dL)  217 H  270 H   (75-99)  mg/dL


 


Calcium     (8.4-10.2)  mg/dL


 


AST     (17-59)  U/L


 


Alkaline Phosphatase     ()  U/L


 


Total Protein     (6.3-8.2)  g/dL


 


Albumin     (3.5-5.0)  g/dL














  03/05/20 03/05/20 03/05/20 Range/Units





  06:20 07:17 11:48 


 


RBC     (4.30-5.90)  m/uL


 


Hgb     (13.0-17.5)  gm/dL


 


Hct     (39.0-53.0)  %


 


Plt Count     (150-450)  k/uL


 


Lymphocytes #     (1.0-4.8)  k/uL


 


BUN  22 H    (9-20)  mg/dL


 


Glucose  157 H    (74-99)  mg/dL


 


POC Glucose (mg/dL)   168 H  158 H  (75-99)  mg/dL


 


Calcium  8.3 L    (8.4-10.2)  mg/dL


 


AST  16 L    (17-59)  U/L


 


Alkaline Phosphatase  144 H    ()  U/L


 


Total Protein  5.9 L    (6.3-8.2)  g/dL


 


Albumin  2.7 L    (3.5-5.0)  g/dL








                      Microbiology - Last 24 Hours (Table)











 03/03/20 17:31 Blood Culture - Preliminary





 Blood    No Growth after 24 hours











CT scan - abdomen: report reviewed (Suspicion for proximal sigmoid lesion on 

computed tomography scan abdomen.)





Assessment and Plan


(1) Abnormal CT of the abdomen


Narrative/Plan: 


74-year-old male who presented to the hospital with multiple complaints 

currently being managed medically by the cardiology service and on antibiotic 

therapy for urinary tract infection.  Computed tomography scan of the abdomen 

performed was suspicious for colonic mass.  However, patient has recently 

undergone endoscopic evaluation with colonoscopy in January 2020 with 

polypectomy and completed colonoscopy with prep team to fair.  Although sigmoid 

mass is highly unlikely we'll plan for flexible sigmoidoscopy to rule out a 

lesion correlating to computed tomography scan.  More likely finding is related 

to peristalsis, redundant colon, or other etiology.


Current Visit: Yes   Status: Acute   Code(s): R93.5 - ABN FINDINGS ON DX IMAGING

OF ABD REGIONS, INC RETROPERITON   SNOMED Code(s): 94609066147948333


   





(2) Chronic anemia


Current Visit: Yes   Status: Acute   Code(s): D64.9 - ANEMIA, UNSPECIFIED   

SNOMED Code(s): 497447428


   


Plan: 





Supportive care


Clear liquid diet


Bowel prep ordered today with tap water enemas 2 prior to procedure tomorrow


Plan for flexible sigmoidoscopy tomorrow


Nothing by mouth after midnight


Case has been discussed with the patient and his son at length


Thank you for allowing us to participate in the care of the patient

## 2020-03-06 NOTE — P.PN
Subjective


Progress Note Date: 03/06/20





Patient is a 74-year-old male who presented to [facility] 

emergency room via EMS with multiple vague symptoms including confusion and 

mental status changes, episodes of shortness of breath he was evaluated in the 

emergency room, he had evidence of urinary tract infection and evidence of e

levated troponin level, he also had evidence of dehydration was elevated BUN and

creatinine he was admitted to telemetry floor for further evaluation and 

cardiology consultation was requested.  Patient was seen and examined on the 

telemetry floor he is alert and oriented 3 in no apparent distress, he denies 

any chest pain or shortness of breath there is no fever or chills no headache or

dizziness no nausea or vomiting no abdominal pain no diarrhea no burning was 

urination no frequency or urgency and no hematuria








On 03/02/2020 patient is alert and oriented 3.  2-D echo completed awaiting to 

be red.  Patient remains on IV Lasix and IV heparin.  Cardiology services are 

following.  Repeat chest x-ray has been ordered due to wheezing is auscultation.

 DuoNeb breathing treatments ordered creatinine is improving at 1.32 and bun 33.

 At this time patient denies chest pain.  Patient is any nausea vomiting or 

diarrhea.  Patient denies any urinary burning or frequency





On 03/03/2020 patient is alert and oriented 3.  2-D echo completed showing an 

EF of 45-50%.  Patient remains on IV Lasix per cardiology services.  Creatinine 

improving to 1.12 and bun 28.  Did discuss case with cardiology nurse 

practitioner Dr. Tellez possible plans for heart cath will discuss with Dr. Crooks.  At this time patient remains on heparin drip.  Wheeziness has improved.

 Patient is maintained on DuoNeb breathing treatments.  No active 

cardiopulmonary disease.  No change.  Patient denies chest pain or shortness of 

breath.  Patient denies nausea vomiting or diarrhea.  Patient denies any urinary

burning or frequency





On 03/04/2020 patient is alert and oriented 3 family at bedside.  Patient 

having positive blood culture growing gram-negative bacilli.  Infectious disease

has been consulted and patient started on Flagyl and Rocephin.  No plans for 

cardiac catheterization at this time per cardiology CT of abdomen and pelvis 

completed per infectious disease ordered.  Concerns for possible constricting le

rain and tumor.  Oncology services will be consulted.  Patient wife at bedside 

reports that he did have a colonoscopy in January with Dr. Griffin and and was 

recommended after that colonoscopy to obtain a CT of abdomen and pelvis but 

patient declined.  At this time patient denies any chest pain or shortness of 

breath.  Patient denies nausea vomiting or diarrhea.  Patient denies any urinary

burning or frequency.





On 03/05/2020 patient is alert and oriented 3.  Dr. lugo consulted for CT 

resulted discussed case with Dr. thornton recommending GI consult due to recent 

colonoscopy.  Patient remains on Rocephin and Flagyl repeat blood culture 

pending.  Cardiology, infectious disease, oncology and GI services following.  

Patient denies chest pain or shortness of breath.  Patient denies nausea 

vomiting or diarrhea.  Patient denies any urinary burning or frequency.  Patient

has been transitioned to oral Lasix per cardiology 





On 03/06/2020 patient is alert and oriented 3.  GI services planning flexible 

sigmoidoscopy today due to abnormal CT of the abdomen with recent colonoscopy 

completed in January.  Patient remains on Flagyl and Levaquin per ID for 

positive blood culture repeat blood culture pending.  At this time patient 

denies chest pain or shortness breath.  Patient denies nausea vomiting or 

diarrhea.  Patient denies any urinary burning or frequency





Objective





- Vital Signs


Vital signs: 


                                   Vital Signs











Temp  98.7 F   03/06/20 08:10


 


Pulse  66   03/06/20 08:10


 


Resp  18   03/06/20 08:10


 


BP  137/63   03/06/20 08:10


 


Pulse Ox  98   03/06/20 08:10








                                 Intake & Output











 03/05/20 03/06/20 03/06/20





 18:59 06:59 18:59


 


Intake Total 240  


 


Balance 240  


 


Weight  101.9 kg 


 


Intake:   


 


  Oral 240  


 


Other:   


 


  Voiding Method Toilet Toilet Toilet


 


  # Voids 2 1 


 


  # Bowel Movements 1  














- Exam





In general patient is alert and oriented 3 in no apparent distress


HEENT head normocephalic and atraumatic


Neck is supple no JVD no goiter no lymphadenopathy


Chest exam reveals a few scattered rhonchi no wheezing


Cardiac exam reveals regular heart sounds no gallops no murmurs


Abdomen is soft nontender no organomegaly


Extremity exam reveals no edema no cyanosis or clubbing


Neurological examination reveals no gross focal deficits





- Labs


CBC & Chem 7: 


                                 03/06/20 07:38





                                 03/06/20 07:38


Labs: 


                  Abnormal Lab Results - Last 24 Hours (Table)











  03/05/20 03/05/20 03/06/20 Range/Units





  11:48 17:23 06:11 


 


RBC     (4.30-5.90)  m/uL


 


Hgb     (13.0-17.5)  gm/dL


 


Hct     (39.0-53.0)  %


 


Lymphocytes #     (1.0-4.8)  k/uL


 


Glucose     (74-99)  mg/dL


 


POC Glucose (mg/dL)  158 H  129 H  74 L  (75-99)  mg/dL


 


Calcium     (8.4-10.2)  mg/dL


 


Total Protein     (6.3-8.2)  g/dL


 


Albumin     (3.5-5.0)  g/dL














  03/06/20 03/06/20 Range/Units





  07:38 07:38 


 


RBC  3.70 L   (4.30-5.90)  m/uL


 


Hgb  10.0 L   (13.0-17.5)  gm/dL


 


Hct  31.1 L   (39.0-53.0)  %


 


Lymphocytes #  0.9 L   (1.0-4.8)  k/uL


 


Glucose   71 L  (74-99)  mg/dL


 


POC Glucose (mg/dL)    (75-99)  mg/dL


 


Calcium   8.1 L  (8.4-10.2)  mg/dL


 


Total Protein   5.8 L  (6.3-8.2)  g/dL


 


Albumin   2.6 L  (3.5-5.0)  g/dL








                      Microbiology - Last 24 Hours (Table)











 03/03/20 17:31 Blood Culture - Preliminary





 Blood    No Growth after 48 hours














Assessment and Plan


Assessment: 


1. shortness of breath likely related to congestive heart failure and possibly 

related to ischemic heart disease, VQ scan was done and was negative. 





2.  non-ST elevation myocardial infarction.  Patient remains on IV heparin.  At 

this time no plans for cardiac catheterization continue medical management





3.  evidence of dehydration was elevated BUN and creatinine.  Creatinine 

improving to 1.3 to and bun 33.  This does appear baseline for patient.  

Creatinine improving to 1.12 and bun 28.  Resolved





4.  Acute on chronic diastolic congestive heart failure.  2-D echo completed 

showing an EF of 45-50%  BNP 10,600. Patient currently on IV Lasix.  Discussed 

case with cardiology patient will continue current dose of IV Lasix for 24 

hours.  IV Lasix has been changed to Lasix 20 mg twice a day





5.  evidence of urinary tract infection.  Patient asymptomatic.  Urine culture 

negative.  Urine culture showing no growth.  Patient is currently maintained on 

Rocephin





6.  underlying history of chronic kidney disease stage II





7.  underlying history of diabetes mellitus type II.  Home meds resumed





8.  Increased wheeziness.  Chest x-ray and DuoNeb breathing.  Chest x-ray 

completed showing no active cardiopulmonary disease.  No change.  Wheeziness has

resolved





9.  Positive blood culture with gram positive bacilli.  Infectious disease 

consulted started on Rocephin and Flagyl repeat blood culture ordered





10.  Abnormal CT of abdomen and pelvis.  CT of abdomen and pelvis completed 

showing locally dilated proximal sigmoid colon with irregular wall thickening 

suggestive a constricting lesion in tumor.  Per patients without bedside patient

did have colonoscopy in January with Dr. Griffin.  Oncology services will be 

consulted at this time to further investigate.  Per oncology service is 

recommending GI consult.  Per GI services patient to undergo flex sigmoidoscopy 

today 03/06/2020





DVT prophylaxis heparin.  GI prophylaxis Pepcid


Cardiology, infectious disease and oncology services consulted


Patient currently on Rocephin and Flagyl


Repeat cultures ordered


Plans for flexible sigmoidoscopy today 03/06/2020














I performed an examination of the patient and discussed their management with 

the Nurse Practitioner.  I have reviewed the Nurse Practitioner's notes and 

agree with the documented findings and plan of care

## 2020-03-06 NOTE — PN
PROGRESS NOTE



Mr. Acosta is a 74-year-old male who presented with symptoms of progressive dyspnea,

change in mental status and mild troponin elevation.  He had a positive blood culture.

He is scheduled to undergo workup today with colonoscopy.  He is feeling better

breathing wise.  His breathing is better.  He denies any dizziness.  No palpitation.

He denies any nausea.  He was seen by Dr. Guevara yesterday for possible colonic mass.  He

continues to be at this time on aspirin once a day, Lipitor 80 mg daily, Coreg 6.25 mg

twice a day, Lasix 20 mg twice a day, isosorbide mononitrate 30 mg daily, lisinopril 40

mg daily, spironolactone 12.5 mg daily.



PHYSICAL EXAMINATION:

Blood pressure 137/60 with the heart rate in the 70s.

LUNGS:  Clear.

HEART: Regular rate and rhythm. S1, S2.  No S3.  No rub with a systolic murmur.

ABDOMEN:  Soft, nontender.

EXTREMITIES:  No edema.



LAB DATA:

Lab data revealed BUN and creatinine 16 and 0.9, potassium 3.8, hemoglobin of 10.



IMPRESSION:

1. Non ST-segment elevation myocardial infarction.

2. Episode of congestive heart failure with preserved systolic function.

3. Questionable colonic mass.

4. Positive blood cultures.  Workup in progress.

5. Renal function abnormality improving.

6. Diabetes mellitus.



RECOMMENDATION:

From the cardiac standpoint, he is stable. We will continue on present regimen.  He

will follow up as an outpatient with Dr. Fuchs and we will see him on as-needed basis.

He will be further evaluated as an outpatient regarding the need to undergo further

cardiac workup including coronary angiography.  I discussed those findings with the

patient as well as his son.



MMODL / IJN: [id number] / Job#: [id number]

## 2020-03-06 NOTE — US
EXAMINATION TYPE: US abdomen complete

 

DATE OF EXAM: 3/6/2020

 

COMPARISON: CT

 

CLINICAL HISTORY: liver disease.

 

EXAM MEASUREMENTS:

 

Liver Length:  15.6 cm   

Gallbladder Wall:  0.4 cm   

CBD:  0.4 cm

Spleen:  17.1 cm   

Right Kidney:  13.9 x 6.7 x 5.8 cm 

Left Kidney:  13.2 x 5.8 x 5.4 cm   

 

Technically difficult study due to extensive midline bowel gas. 

 

Pancreas:  not visualized due to midline bowel gas

Liver:  wnl  

Gallbladder:  No stones seen

**Evidence for sonographic Hayden's sign:  No

CBD:  wnl 

Spleen:  enlarged at 17.1 cm    

Right Kidney:  No hydronephrosis or masses seen   

Left Kidney:  No hydronephrosis or masses seen   

Upper IVC:  wnl  

Abd Aorta:  not visualized due to midline bowel gas.

 

 

 

Impression

 

There is splenomegaly. No focal liver defect. No gallstones or dilated ducts. Gallbladder wall appear
s mildly thickened.

## 2020-03-07 VITALS — TEMPERATURE: 98 F | HEART RATE: 66 BPM | SYSTOLIC BLOOD PRESSURE: 105 MMHG | DIASTOLIC BLOOD PRESSURE: 54 MMHG

## 2020-03-07 LAB
ERYTHROCYTE [DISTWIDTH] IN BLOOD BY AUTOMATED COUNT: 3.68 M/UL (ref 4.3–5.9)
ERYTHROCYTE [DISTWIDTH] IN BLOOD: 15 % (ref 11.5–15.5)
GLUCOSE BLD-MCNC: 214 MG/DL (ref 75–99)
GLUCOSE BLD-MCNC: 304 MG/DL (ref 75–99)
HBA1C MFR BLD: 7.2 % (ref 4–6)
HCT VFR BLD AUTO: 31.3 % (ref 39–53)
HGB BLD-MCNC: 10 GM/DL (ref 13–17.5)
INR PPP: 1 (ref ?–1.2)
KAPPA LC FREE SER NEPH-MCNC: 8.23 MG/DL (ref 0.33–1.94)
MCH RBC QN AUTO: 27.1 PG (ref 25–35)
MCHC RBC AUTO-ENTMCNC: 31.9 G/DL (ref 31–37)
MCV RBC AUTO: 85 FL (ref 80–100)
PLATELET # BLD AUTO: 185 K/UL (ref 150–450)
PT BLD: 10.6 SEC (ref 9–12)
WBC # BLD AUTO: 3.4 K/UL (ref 3.8–10.6)

## 2020-03-07 RX ADMIN — ASPIRIN 81 MG CHEWABLE TABLET SCH MG: 81 TABLET CHEWABLE at 09:14

## 2020-03-07 RX ADMIN — SPIRONOLACTONE SCH MG: 25 TABLET, FILM COATED ORAL at 09:13

## 2020-03-07 RX ADMIN — IPRATROPIUM BROMIDE AND ALBUTEROL SULFATE SCH ML: .5; 3 SOLUTION RESPIRATORY (INHALATION) at 11:15

## 2020-03-07 RX ADMIN — INSULIN ASPART SCH UNIT: 100 INJECTION, SOLUTION INTRAVENOUS; SUBCUTANEOUS at 12:15

## 2020-03-07 RX ADMIN — ISOSORBIDE MONONITRATE SCH MG: 30 TABLET, EXTENDED RELEASE ORAL at 09:14

## 2020-03-07 RX ADMIN — FAMOTIDINE SCH MG: 20 TABLET, FILM COATED ORAL at 09:14

## 2020-03-07 RX ADMIN — CARVEDILOL SCH MG: 6.25 TABLET, FILM COATED ORAL at 09:14

## 2020-03-07 RX ADMIN — Medication SCH MG: at 09:14

## 2020-03-07 RX ADMIN — IPRATROPIUM BROMIDE AND ALBUTEROL SULFATE SCH ML: .5; 3 SOLUTION RESPIRATORY (INHALATION) at 07:30

## 2020-03-07 RX ADMIN — FUROSEMIDE SCH MG: 20 TABLET ORAL at 09:14

## 2020-03-07 RX ADMIN — LISINOPRIL SCH MG: 20 TABLET ORAL at 09:14

## 2020-03-07 RX ADMIN — HEPARIN SODIUM SCH UNIT: 5000 INJECTION, SOLUTION INTRAVENOUS; SUBCUTANEOUS at 09:14

## 2020-03-07 RX ADMIN — INSULIN DETEMIR SCH UNIT: 100 INJECTION, SOLUTION SUBCUTANEOUS at 10:17

## 2020-03-07 RX ADMIN — ATORVASTATIN CALCIUM SCH MG: 80 TABLET, FILM COATED ORAL at 09:14

## 2020-03-07 RX ADMIN — INSULIN ASPART SCH UNIT: 100 INJECTION, SOLUTION INTRAVENOUS; SUBCUTANEOUS at 06:49

## 2020-03-07 NOTE — PN
PROGRESS NOTE



Mr. Acosta is a 74-year-old male who presented to the hospital with symptoms of

progressive dyspnea and change in mental status with mild troponin elevation.  He

underwent sigmoidoscopy yesterday that revealed no evidence of significant masses.  He

is doing well this morning.  His breathing is stable.  He denies any dizziness or

palpitation.  He denies any nausea.  He is continuing to be at this time on aspirin,

Lipitor 80 mg daily, Coreg 6.5 mg twice a day, Lasix 20 mg twice a day, isosorbide

mononitrate 30 mg daily, lisinopril 40 mg daily, spironolactone 12.5 mg daily.



PHYSICAL EXAMINATION:

Blood pressure 128/60 with a heart rate in the 70s.

LUNGS:  Clear.

HEART:  Regular rate and rhythm, S1, S2.  No S3.  No rub.

ABDOMEN:  Soft and nontender.  EXTREMITIES:  No significant edema.



IMPRESSION:

1. Symptoms of congestive heart failure, resolved.

2. Mild troponin elevation.  Further workup will be needed.

3. An episode of positive blood culture.  Workup in progress.

4. Diabetes mellitus.



RECOMMENDATIONS:

From the cardiac standpoint, he should be able to be discharged home today and followed

as an outpatient with Dr. LUCAS Fuchs to make a decision regarding further cardiac

workup.





MMODL / IJN: [id number] / Job#: [id number]

## 2020-03-09 NOTE — P.DS
Providers


Date of admission: 


03/01/20 15:40





Expected date of discharge: 03/07/20


Attending physician: 


Marlin Tafoya





Consults: 





                                        





03/01/20 06:32


Consult Physician Urgent 


   Consulting Provider: [facility]


   Consult Reason/Comments: elevated trop, HTN


   Do you want consulting provider notified?: Yes, Notify in am





03/03/20 14:42


Consult Physician Urgent 


   Consulting Provider: Endy Handy


   Consult Reason/Comments: positive blood cultures


   Do you want consulting provider notified?: Yes





03/04/20 10:32


Consult Physician Routine 


   Consulting Provider: Ryan Guevara


   Consult Reason/Comments: CT result


   Do you want consulting provider notified?: Yes











Primary care physician: 


[facility]





[facility] Course: 





Discharge diagnosis


1. shortness of breath likely related to congestive heart failure and possibly 

related to ischemic heart disease, VQ scan was done and was negative. 





2.  non-ST elevation myocardial infarction.  Patient remains on IV heparin.  At 

this time no plans for cardiac catheterization continue medical management.  

Patient cleared for discharge from cardiology standpoint will follow-up 

outpatient





3.  evidence of dehydration was elevated BUN and creatinine.  Creatinine 

improving to 1.3 to and bun 33.  This does appear baseline for patient.  

Creatinine improving to 1.12 and bun 28.  Resolved





4.  Acute on chronic diastolic congestive heart failure.  2-D echo completed 

showing an EF of 45-50%  BNP 10,600. Patient currently on IV Lasix.  Discussed 

case with cardiology patient will continue current dose of IV Lasix for 24 

hours.  IV Lasix has been changed to Lasix 20 mg twice a day





5.  evidence of urinary tract infection.  Patient asymptomatic.  Urine culture 

negative.  Urine culture showing no growth.  Patient is currently maintained on 

Rocephin





6.  underlying history of chronic kidney disease stage II





7.  underlying history of diabetes mellitus type II.  Home meds resumed





8.  Increased wheeziness.  Chest x-ray and DuoNeb breathing.  Chest x-ray 

completed showing no active cardiopulmonary disease.  No change.  Wheeziness has

resolved





9.  Positive blood culture with gram positive bacilli.  Infectious disease 

consulted started on Rocephin and Flagyl repeat blood culture ordered.  Repeat 

blood culture negative.  Patient was discharged on Ceftin





10.  Abnormal CT of abdomen and pelvis.  CT of abdomen and pelvis completed 

showing locally dilated proximal sigmoid colon with irregular wall thickening 

suggestive a constricting lesion in tumor.  Per patients without bedside patient

did have colonoscopy in January with Dr. Griffin.  Oncology services will be 

consulted at this time to further investigate.  Per oncology service is 

recommending GI consult.  Status post flexible sigmoidoscopy showing no gross 

mass, left colonic diverticulosis moderate internal hemorrhoids fair prep.  Okay

for discharge from GI standpoint





Hospital course





Patient is a 74-year-old male who presented to [facility] 

emergency room via EMS with multiple vague symptoms including confusion and 

mental status changes, episodes of shortness of breath he was evaluated in the 

emergency room, he had evidence of urinary tract infection and evidence of 

elevated troponin level, he also had evidence of dehydration was elevated BUN 

and creatinine he was admitted to telemetry floor for further evaluation and 

cardiology consultation was requested.  Patient was seen and examined on the 

telemetry floor he is alert and oriented 3 in no apparent distress, he denies 

any chest pain or shortness of breath there is no fever or chills no headache or

dizziness no nausea or vomiting no abdominal pain no diarrhea no burning was 

urination no frequency or urgency and no hematuria








On 03/02/2020 patient is alert and oriented 3.  2-D echo completed awaiting to 

be red.  Patient remains on IV Lasix and IV heparin.  Cardiology services are 

following.  Repeat chest x-ray has been ordered due to wheezing is auscultation.

 DuoNeb breathing treatments ordered creatinine is improving at 1.32 and bun 33.

 At this time patient denies chest pain.  Patient is any nausea vomiting or 

diarrhea.  Patient denies any urinary burning or frequency





On 03/03/2020 patient is alert and oriented 3.  2-D echo completed showing an 

EF of 45-50%.  Patient remains on IV Lasix per cardiology services.  Creatinine 

improving to 1.12 and bun 28.  Did discuss case with cardiology nurse 

practitioner Dr. Tellez possible plans for heart cath will discuss with Dr. Crooks.  At this time patient remains on heparin drip.  Wheeziness has improved.

 Patient is maintained on DuoNeb breathing treatments.  No active 

cardiopulmonary disease.  No change.  Patient denies chest pain or shortness of 

breath.  Patient denies nausea vomiting or diarrhea.  Patient denies any urinary

burning or frequency





On 03/04/2020 patient is alert and oriented 3 family at bedside.  Patient 

having positive blood culture growing gram-negative bacilli.  Infectious disease

has been consulted and patient started on Flagyl and Rocephin.  No plans for 

cardiac catheterization at this time per cardiology CT of abdomen and pelvis 

completed per infectious disease ordered.  Concerns for possible constricting 

lesion and tumor.  Oncology services will be consulted.  Patient wife at bedside

reports that he did have a colonoscopy in January with Dr. Griffin and and was 

recommended after that colonoscopy to obtain a CT of abdomen and pelvis but 

patient declined.  At this time patient denies any chest pain or shortness of 

breath.  Patient denies nausea vomiting or diarrhea.  Patient denies any urinary

burning or frequency.





On 03/05/2020 patient is alert and oriented 3.  Dr. guevara consulted for CT 

resulted discussed case with Dr. thornton recommending GI consult due to recent 

colonoscopy.  Patient remains on Rocephin and Flagyl repeat blood culture 

pending.  Cardiology, infectious disease, oncology and GI services following.  

Patient denies chest pain or shortness of breath.  Patient denies nausea 

vomiting or diarrhea.  Patient denies any urinary burning or frequency.  Patient

has been transitioned to oral Lasix per cardiology 





On 03/06/2020 patient is alert and oriented 3.  GI services planning flexible 

sigmoidoscopy today due to abnormal CT of the abdomen with recent colonoscopy 

completed in January.  Patient remains on Flagyl and Levaquin per ID for 

positive blood culture repeat blood culture pending.  At this time patient 

denies chest pain or shortness breath.  Patient denies nausea vomiting or 

diarrhea.  Patient denies any urinary burning or frequency





On 03/07/2020 patient will be discharged home.  Flexible sigmoidoscopy completed

showing no mass.  Patient discharged on Ceftin.





This was dictated for . Dr. Tafoya did not personally examine patient on 

03/07/2020





I performed an examination of the patient and discussed their management with 

the Nurse Practitioner.  I have reviewed the Nurse Practitioner's notes and 

agree with the documented findings and plan of care


Patient Condition at Discharge: Stable





Plan - Discharge Summary


New Discharge Prescriptions: 


New


   Cefuroxime Axetil [Ceftin] 500 mg PO BID 5 Days #10 tab


   metroNIDAZOLE [Flagyl] 500 mg PO TID  tab


   Furosemide [Lasix] 20 mg PO BID  tab


   Atorvastatin [Lipitor] 80 mg PO DAILY  tab


   Nitroglycerin Sl Tabs [Nitrostat] 0.4 mg SUBLINGUAL Q5M PRN  tab


     PRN Reason: Chest Pain


   ALPRAZolam [Xanax] 0.25 mg PO Q6HR PRN  tab


     PRN Reason: Mild Anxiety





Continue


   Benazepril HCl 40 mg PO QAM


   Aspirin 81 mg PO DAILY


   Tamsulosin [Flomax] 0.4 mg PO HS


   Carvedilol [Coreg] 6.25 mg PO BID


   Isosorbide Mononitrate ER [Imdur] 30 mg PO QAM


   Spironolactone [Aldactone] 12.5 mg PO QAM


   Ferrous Sulfate [Iron (65 MG Elemental)] 325 mg PO DAILY #30 tab


   Insulin Glargine,Hum.rec.anlog [Basaglar Kwikpen U-100] 40 unit SQ QAM





Discontinued


   Lovastatin [Mevacor] 20 mg PO HS


   Furosemide [Lasix] 20 mg PO DAILY


Discharge Medication List





Benazepril HCl 40 mg PO QAM 12/03/17 [History]


Aspirin 81 mg PO DAILY 12/04/17 [History]


Tamsulosin [Flomax] 0.4 mg PO HS 12/04/17 [History]


Carvedilol [Coreg] 6.25 mg PO BID 09/05/18 [History]


Isosorbide Mononitrate ER [Imdur] 30 mg PO QAM 09/05/18 [History]


Spironolactone [Aldactone] 12.5 mg PO QAM 09/05/18 [History]


Ferrous Sulfate [Iron (65 MG Elemental)] 325 mg PO DAILY #30 tab 12/03/18 [Rx]


Insulin Glargine,Hum.rec.anlog [Basaglar Kwikpen U-100] 40 unit SQ QAM 01/08/20 

[History]


ALPRAZolam [Xanax] 0.25 mg PO Q6HR PRN  tab 03/07/20 [Rx]


Atorvastatin [Lipitor] 80 mg PO DAILY  tab 03/07/20 [Rx]


Cefuroxime Axetil [Ceftin] 500 mg PO BID 5 Days #10 tab 03/07/20 [Rx]


Furosemide [Lasix] 20 mg PO BID  tab 03/07/20 [Rx]


Nitroglycerin Sl Tabs [Nitrostat] 0.4 mg SUBLINGUAL Q5M PRN  tab 03/07/20 [Rx]


metroNIDAZOLE [Flagyl] 500 mg PO TID  tab 03/07/20 [Rx]








Follow up Appointment(s)/Referral(s): 


Ryan Guevara MD [STAFF PHYSICIAN] - 1 Week (Patient to call & schedule appt - 

office closed today. )


Merry Grimaldo MD [Primary Care Provider] - 1-2 days (Patient to call and 

schedule follow up - office closed today. )


Sadie Hoffman MD [STAFF PHYSICIAN] - 1 Week (Patient to call and schedule follow

up - office closed today. )


Torey Fuchs MD [STAFF PHYSICIAN] - 1 Week (Patient to call and schedule 

follow up - office closed today)


Patient Instructions/Handouts:  Shortness of Breath (DC)


Discharge Disposition: HOME SELF-CARE

## 2020-03-10 LAB
ALBUMIN SERPL ELPH-MCNC: 2.2 G/DL (ref 3.8–4.9)
GAMMA GLOB SERPL ELPH-MCNC: 1.24 G/DL (ref 0.7–1.5)

## 2020-03-10 NOTE — CDI
Documentation Clarification Form

Date: 3/10/20

From: Rajani Tyson

Phone: If you have a question about this query, please contact Marina Curtis, 
 at [phone] between 8am and 5pm.

Admit Date: 3/1/20

Discharge Date: 3/7/20

Patient Name: MESFIN CATALAN

Visit Number: [id number]



ATTENTION: The Clinical Documentation Specialists (CDI) and [facility] Coding Staff 
appreciate your assistance in clarifying documentation. Please respond to the 
clarification below the line at the bottom and electronically sign. The CDI & 
[facility] Coding staff will review the response and follow-up if needed. Please note: 
Queries are made part of the Legal Health Record. If you have any questions, 
please contact the author of this message via ITS.



Dear Dr. Marlin Tafoya,



Bacteremia/positive blood culture were documented in the infectious disease & 
hematology consult, many progress notes and DS.

Patient history/risk factors: NSTEMI, HTN w acute on chronic diastolic CHF and 
Stage III CKD, UTI, DM, portal HTN w gastropathy, gastric & esophageal varices

Clinical Indicators: WBC-14.8, Left Shift-13.4, lactic acid-1.4, total Bilrubin-
1.0, Plt-173/111, 

Blood Culture: Eubacterium limosum

Treatment: IV fluids, 

Antibiotics: 3/3-IV Rocephin, discharged on Ceftin 500 mg po BID x 5 days



Bacteremia is considered a lab finding. 



Please clarify if that lab finding is clinical indicator of a more definitive 
medical diagnosis such as:



Sepsis

Infectious Process, please specify:

Other, please specify ________

Unable to determine

___________________________________________________________________________

other, skin contamination

MTDD

## 2020-06-26 ENCOUNTER — HOSPITAL ENCOUNTER (INPATIENT)
Dept: HOSPITAL 47 - EC | Age: 75
LOS: 5 days | Discharge: HOME | DRG: 292 | End: 2020-07-01
Attending: INTERNAL MEDICINE | Admitting: INTERNAL MEDICINE
Payer: MEDICARE

## 2020-06-26 VITALS — BODY MASS INDEX: 35 KG/M2

## 2020-06-26 DIAGNOSIS — R29.6: ICD-10-CM

## 2020-06-26 DIAGNOSIS — K63.5: ICD-10-CM

## 2020-06-26 DIAGNOSIS — I85.10: ICD-10-CM

## 2020-06-26 DIAGNOSIS — R18.8: ICD-10-CM

## 2020-06-26 DIAGNOSIS — I25.2: ICD-10-CM

## 2020-06-26 DIAGNOSIS — Z79.899: ICD-10-CM

## 2020-06-26 DIAGNOSIS — Z79.82: ICD-10-CM

## 2020-06-26 DIAGNOSIS — I44.0: ICD-10-CM

## 2020-06-26 DIAGNOSIS — F32.9: ICD-10-CM

## 2020-06-26 DIAGNOSIS — I50.43: ICD-10-CM

## 2020-06-26 DIAGNOSIS — Z98.890: ICD-10-CM

## 2020-06-26 DIAGNOSIS — Z91.81: ICD-10-CM

## 2020-06-26 DIAGNOSIS — M19.90: ICD-10-CM

## 2020-06-26 DIAGNOSIS — Z83.3: ICD-10-CM

## 2020-06-26 DIAGNOSIS — K74.60: ICD-10-CM

## 2020-06-26 DIAGNOSIS — I45.10: ICD-10-CM

## 2020-06-26 DIAGNOSIS — Z86.73: ICD-10-CM

## 2020-06-26 DIAGNOSIS — Z79.4: ICD-10-CM

## 2020-06-26 DIAGNOSIS — Z11.59: ICD-10-CM

## 2020-06-26 DIAGNOSIS — R53.1: ICD-10-CM

## 2020-06-26 DIAGNOSIS — I11.0: Primary | ICD-10-CM

## 2020-06-26 DIAGNOSIS — F17.200: ICD-10-CM

## 2020-06-26 DIAGNOSIS — D50.9: ICD-10-CM

## 2020-06-26 DIAGNOSIS — Z80.3: ICD-10-CM

## 2020-06-26 DIAGNOSIS — E78.5: ICD-10-CM

## 2020-06-26 DIAGNOSIS — K40.90: ICD-10-CM

## 2020-06-26 DIAGNOSIS — F41.9: ICD-10-CM

## 2020-06-26 DIAGNOSIS — N40.0: ICD-10-CM

## 2020-06-26 DIAGNOSIS — E11.9: ICD-10-CM

## 2020-06-26 LAB
ALBUMIN SERPL-MCNC: 2.4 G/DL (ref 3.5–5)
ALP SERPL-CCNC: 94 U/L (ref 38–126)
ALT SERPL-CCNC: 12 U/L (ref 4–49)
ANION GAP SERPL CALC-SCNC: 7 MMOL/L
APTT BLD: 24.8 SEC (ref 22–30)
AST SERPL-CCNC: 39 U/L (ref 17–59)
BASOPHILS # BLD AUTO: 0 K/UL (ref 0–0.2)
BASOPHILS NFR BLD AUTO: 0 %
BUN SERPL-SCNC: 31 MG/DL (ref 9–20)
CALCIUM SPEC-MCNC: 7.9 MG/DL (ref 8.4–10.2)
CHLORIDE SERPL-SCNC: 104 MMOL/L (ref 98–107)
CK SERPL-CCNC: 635 U/L (ref 55–170)
CO2 SERPL-SCNC: 25 MMOL/L (ref 22–30)
EOSINOPHIL # BLD AUTO: 0 K/UL (ref 0–0.7)
EOSINOPHIL NFR BLD AUTO: 1 %
ERYTHROCYTE [DISTWIDTH] IN BLOOD BY AUTOMATED COUNT: 4.01 M/UL (ref 4.3–5.9)
ERYTHROCYTE [DISTWIDTH] IN BLOOD: 14.4 % (ref 11.5–15.5)
GLUCOSE BLD-MCNC: 70 MG/DL (ref 75–99)
GLUCOSE BLD-MCNC: 76 MG/DL (ref 75–99)
GLUCOSE BLD-MCNC: 87 MG/DL (ref 75–99)
GLUCOSE SERPL-MCNC: 65 MG/DL (ref 74–99)
HCT VFR BLD AUTO: 33.3 % (ref 39–53)
HGB BLD-MCNC: 10.6 GM/DL (ref 13–17.5)
HYALINE CASTS UR QL AUTO: 14 /LPF (ref 0–2)
INR PPP: 1.1 (ref ?–1.2)
LYMPHOCYTES # SPEC AUTO: 0.7 K/UL (ref 1–4.8)
LYMPHOCYTES NFR SPEC AUTO: 11 %
MAGNESIUM SPEC-SCNC: 1.8 MG/DL (ref 1.6–2.3)
MCH RBC QN AUTO: 26.3 PG (ref 25–35)
MCHC RBC AUTO-ENTMCNC: 31.7 G/DL (ref 31–37)
MCV RBC AUTO: 82.9 FL (ref 80–100)
MONOCYTES # BLD AUTO: 0.5 K/UL (ref 0–1)
MONOCYTES NFR BLD AUTO: 8 %
NEUTROPHILS # BLD AUTO: 5.4 K/UL (ref 1.3–7.7)
NEUTROPHILS NFR BLD AUTO: 80 %
PH UR: 5.5 [PH] (ref 5–8)
PLATELET # BLD AUTO: 193 K/UL (ref 150–450)
POTASSIUM SERPL-SCNC: 3.6 MMOL/L (ref 3.5–5.1)
PROT SERPL-MCNC: 5.6 G/DL (ref 6.3–8.2)
PROT UR QL: (no result)
PT BLD: 10.9 SEC (ref 9–12)
RBC UR QL: <1 /HPF (ref 0–5)
SODIUM SERPL-SCNC: 136 MMOL/L (ref 137–145)
SP GR UR: 1.01 (ref 1–1.03)
SQUAMOUS UR QL AUTO: 1 /HPF (ref 0–4)
UROBILINOGEN UR QL STRIP: <2 MG/DL (ref ?–2)
WBC # BLD AUTO: 6.8 K/UL (ref 3.8–10.6)
WBC # UR AUTO: 2 /HPF (ref 0–5)

## 2020-06-26 PROCEDURE — 36415 COLL VENOUS BLD VENIPUNCTURE: CPT

## 2020-06-26 PROCEDURE — 83735 ASSAY OF MAGNESIUM: CPT

## 2020-06-26 PROCEDURE — 49083 ABD PARACENTESIS W/IMAGING: CPT

## 2020-06-26 PROCEDURE — 82728 ASSAY OF FERRITIN: CPT

## 2020-06-26 PROCEDURE — 80053 COMPREHEN METABOLIC PANEL: CPT

## 2020-06-26 PROCEDURE — 84443 ASSAY THYROID STIM HORMONE: CPT

## 2020-06-26 PROCEDURE — 84100 ASSAY OF PHOSPHORUS: CPT

## 2020-06-26 PROCEDURE — 82272 OCCULT BLD FECES 1-3 TESTS: CPT

## 2020-06-26 PROCEDURE — 84165 PROTEIN E-PHORESIS SERUM: CPT

## 2020-06-26 PROCEDURE — 81001 URINALYSIS AUTO W/SCOPE: CPT

## 2020-06-26 PROCEDURE — 96365 THER/PROPH/DIAG IV INF INIT: CPT

## 2020-06-26 PROCEDURE — 83540 ASSAY OF IRON: CPT

## 2020-06-26 PROCEDURE — 93005 ELECTROCARDIOGRAM TRACING: CPT

## 2020-06-26 PROCEDURE — 85045 AUTOMATED RETICULOCYTE COUNT: CPT

## 2020-06-26 PROCEDURE — 82607 VITAMIN B-12: CPT

## 2020-06-26 PROCEDURE — 83883 ASSAY NEPHELOMETRY NOT SPEC: CPT

## 2020-06-26 PROCEDURE — 86334 IMMUNOFIX E-PHORESIS SERUM: CPT

## 2020-06-26 PROCEDURE — 83605 ASSAY OF LACTIC ACID: CPT

## 2020-06-26 PROCEDURE — 86431 RHEUMATOID FACTOR QUANT: CPT

## 2020-06-26 PROCEDURE — 96366 THER/PROPH/DIAG IV INF ADDON: CPT

## 2020-06-26 PROCEDURE — 85610 PROTHROMBIN TIME: CPT

## 2020-06-26 PROCEDURE — 87324 CLOSTRIDIUM AG IA: CPT

## 2020-06-26 PROCEDURE — 94640 AIRWAY INHALATION TREATMENT: CPT

## 2020-06-26 PROCEDURE — 96376 TX/PRO/DX INJ SAME DRUG ADON: CPT

## 2020-06-26 PROCEDURE — 82550 ASSAY OF CK (CPK): CPT

## 2020-06-26 PROCEDURE — 70450 CT HEAD/BRAIN W/O DYE: CPT

## 2020-06-26 PROCEDURE — 83690 ASSAY OF LIPASE: CPT

## 2020-06-26 PROCEDURE — 83921 ORGANIC ACID SINGLE QUANT: CPT

## 2020-06-26 PROCEDURE — 85025 COMPLETE CBC W/AUTO DIFF WBC: CPT

## 2020-06-26 PROCEDURE — 99291 CRITICAL CARE FIRST HOUR: CPT

## 2020-06-26 PROCEDURE — 71046 X-RAY EXAM CHEST 2 VIEWS: CPT

## 2020-06-26 PROCEDURE — 93306 TTE W/DOPPLER COMPLETE: CPT

## 2020-06-26 PROCEDURE — 83880 ASSAY OF NATRIURETIC PEPTIDE: CPT

## 2020-06-26 PROCEDURE — 80051 ELECTROLYTE PANEL: CPT

## 2020-06-26 PROCEDURE — 76705 ECHO EXAM OF ABDOMEN: CPT

## 2020-06-26 PROCEDURE — 96375 TX/PRO/DX INJ NEW DRUG ADDON: CPT

## 2020-06-26 PROCEDURE — 83550 IRON BINDING TEST: CPT

## 2020-06-26 PROCEDURE — 85730 THROMBOPLASTIN TIME PARTIAL: CPT

## 2020-06-26 PROCEDURE — 82150 ASSAY OF AMYLASE: CPT

## 2020-06-26 PROCEDURE — 74176 CT ABD & PELVIS W/O CONTRAST: CPT

## 2020-06-26 PROCEDURE — 86038 ANTINUCLEAR ANTIBODIES: CPT

## 2020-06-26 PROCEDURE — 82746 ASSAY OF FOLIC ACID SERUM: CPT

## 2020-06-26 PROCEDURE — 82042 OTHER SOURCE ALBUMIN QUAN EA: CPT

## 2020-06-26 PROCEDURE — 84484 ASSAY OF TROPONIN QUANT: CPT

## 2020-06-26 RX ADMIN — HEPARIN SODIUM SCH MLS/HR: 10000 INJECTION, SOLUTION INTRAVENOUS at 17:27

## 2020-06-26 RX ADMIN — FUROSEMIDE SCH MG: 10 INJECTION, SOLUTION INTRAMUSCULAR; INTRAVENOUS at 23:32

## 2020-06-26 RX ADMIN — NITROGLYCERIN SCH INCH: 20 OINTMENT TOPICAL at 20:44

## 2020-06-26 RX ADMIN — SODIUM CHLORIDE, PRESERVATIVE FREE SCH ML: 5 INJECTION INTRAVENOUS at 20:44

## 2020-06-26 RX ADMIN — NITROGLYCERIN SCH: 20 OINTMENT TOPICAL at 22:18

## 2020-06-26 RX ADMIN — FUROSEMIDE SCH MG: 10 INJECTION, SOLUTION INTRAMUSCULAR; INTRAVENOUS at 17:27

## 2020-06-26 NOTE — ED
General Adult HPI





- General


Chief complaint: Weakness


Stated complaint: Weakness


Time Seen by Provider: 06/26/20 14:34


Source: patient, EMS, RN notes reviewed


Mode of arrival: EMS


Limitations: no limitations





- History of Present Illness


Initial comments: 





Patient is a pleasant 75-year-old male presenting to the emergency department 

with generalized weakness.  Symptoms have been present for the past several 

days.  Decreased oral intake.  Patient has fallen several times without injury. 

No confusion.  No history of similar symptoms previously.  No chest pain or 

dyspnea.  No isolated area of weakness.  Patient did go to his doctor's office 

with a recommended he come to the emergency department





- Related Data


                                Home Medications











 Medication  Instructions  Recorded  Confirmed


 


Benazepril HCl 40 mg PO QAM 12/03/17 03/01/20


 


Aspirin 81 mg PO DAILY 12/04/17 03/01/20


 


Tamsulosin [Flomax] 0.4 mg PO HS 12/04/17 03/01/20


 


Carvedilol [Coreg] 6.25 mg PO BID 09/05/18 03/01/20


 


Isosorbide Mononitrate ER [Imdur] 30 mg PO QAM 09/05/18 03/01/20


 


Spironolactone [Aldactone] 12.5 mg PO QAM 09/05/18 03/01/20


 


Insulin Glargine,Hum.rec.anlog 40 unit SQ QAM 01/08/20 03/01/20





[Basaglar Kwikpen U-100]   








                                  Previous Rx's











 Medication  Instructions  Recorded


 


Ferrous Sulfate [Iron (65  mg PO DAILY #30 tab 12/03/18





Elemental)]  


 


ALPRAZolam [Xanax] 0.25 mg PO Q6HR PRN  tab 03/07/20


 


Atorvastatin [Lipitor] 80 mg PO DAILY  tab 03/07/20


 


Cefuroxime Axetil [Ceftin] 500 mg PO BID 5 Days #10 tab 03/07/20


 


Furosemide [Lasix] 20 mg PO BID  tab 03/07/20


 


Nitroglycerin Sl Tabs [Nitrostat] 0.4 mg SUBLINGUAL Q5M PRN  tab 03/07/20


 


metroNIDAZOLE [Flagyl] 500 mg PO TID  tab 03/07/20











                                    Allergies











Allergy/AdvReac Type Severity Reaction Status Date / Time


 


No Known Allergies Allergy   Verified 06/26/20 14:32














Review of Systems


ROS Statement: 


Those systems with pertinent positive or pertinent negative responses have been 

documented in the HPI.





ROS Other: All systems not noted in ROS Statement are negative.


Constitutional: Denies: fever


Eyes: Denies: eye pain


ENT: Denies: ear pain


Respiratory: Denies: cough, dyspnea


Cardiovascular: Denies: chest pain


Endocrine: Denies: fatigue


Gastrointestinal: Denies: abdominal pain


Genitourinary: Denies: dysuria


Musculoskeletal: Denies: back pain


Skin: Denies: rash


Neurological: Reports: as per HPI.  Denies: headache, confusion





Past Medical History


Past Medical History: Chest Pain / Angina, Heart Failure, CVA/TIA, Diabetes 

Mellitus, Hyperlipidemia, Hypertension, Osteoarthritis (OA)


Additional Past Medical History / Comment(s): incontinent of stools 

intermittently. low iron levels, possible GI bleed-dark tarry stools per son, 

Son stated "has had recent falls related to blood sugar going low 60s"-HX 2017 a

nd 2018 had episodes of CHF approx 7-10 days post receiving flu 

vaccine,TIA,Type2 IDDM


History of Any Multi-Drug Resistant Organisms: None Reported


Past Surgical History: Hernia Repair


Additional Past Surgical History / Comment(s): 9/11/18 robot assisted 

laprascopic umbilical hernia repair with mesh., 6/10/19 repair recurrent 

incarerated hernia


Past Anesthesia/Blood Transfusion Reactions: No Reported Reaction


Additional Past Anesthesia/Blood Transfusion Reaction / Comment(s): never had 

anesthesia


Past Psychological History: Depression


Smoking Status: Current every day smoker


Past Alcohol Use History: None Reported


Past Drug Use History: None Reported





- Past Family History


  ** Mother


Family Medical History: Cancer


Additional Family Medical History / Comment(s): breast cancer





  ** Father


Family Medical History: Unable to Obtain





  ** Sister(s)


Family Medical History: Cancer





  ** Brother(s)


Family Medical History: Diabetes Mellitus





General Exam


Limitations: no limitations


General appearance: alert, in no apparent distress


Head exam: Present: atraumatic, normocephalic


Eye exam: Present: normal appearance, PERRL, EOMI


ENT exam: Present: normal oropharynx


Neck exam: Present: normal inspection.  Absent: tenderness


Respiratory exam: Present: wheezes


Cardiovascular Exam: Present: regular rate, normal rhythm


GI/Abdominal exam: Present: soft.  Absent: tenderness


Extremities exam: Present: pedal edema (Trace bilateral).  Absent: calf 

tenderness


Back exam: Present: normal inspection.  Absent: vertebral tenderness


Neurological exam: Present: alert, oriented X3, CN II-XII intact.  Absent: motor

 sensory deficit


  ** Expanded


Neurological exam: Present: protecting the airway


Patient oriented to: Present: person, place, time


Speech: Present: fluid speech


Cranial nerves: EOM's Intact: Normal


Motor strength exam: RUE: 5, LUE: 5, RLE: 5, LLE: 5


Eye Response: (4) open spontaneously


Motor Response: (6) obeys commands


Verbal Response: (5) oriented


Psychiatric exam: Present: normal affect, normal mood


Skin exam: Present: normal color





Course


                                   Vital Signs











  06/26/20 06/26/20 06/26/20





  14:25 15:06 15:16


 


Temperature 97.8 F  


 


Pulse Rate 64 67 61


 


Respiratory 20  





Rate   


 


Blood Pressure 112/64  


 


O2 Sat by Pulse 95  





Oximetry   














  06/26/20





  15:58


 


Temperature 


 


Pulse Rate 64


 


Respiratory 18





Rate 


 


Blood Pressure 107/70


 


O2 Sat by Pulse 94 L





Oximetry 














EKG Findings





- EKG Comments:


EKG Findings:: Sinus rhythm at 62.  For screening AV block LA of 250.  . 

 QT 44.  .  Right axis.  Right bundle branch block.  Inferior Q waves.  

No acute ST change.





Medical Decision Making





- Medical Decision Making





Patient reevaluated and updated.  Patient resting comfortably in bed.  Case 

discussed with Dr. Tafoya, who will admit covering for Dr. mon.





- Lab Data


Result diagrams: 


                                 06/26/20 14:55





                                 06/26/20 14:55


                                   Lab Results











  06/26/20 06/26/20 06/26/20 Range/Units





  14:42 14:55 14:55 


 


WBC   6.8   (3.8-10.6)  k/uL


 


RBC   4.01 L   (4.30-5.90)  m/uL


 


Hgb   10.6 L   (13.0-17.5)  gm/dL


 


Hct   33.3 L   (39.0-53.0)  %


 


MCV   82.9   (80.0-100.0)  fL


 


MCH   26.3   (25.0-35.0)  pg


 


MCHC   31.7   (31.0-37.0)  g/dL


 


RDW   14.4   (11.5-15.5)  %


 


Plt Count   193   (150-450)  k/uL


 


Neutrophils %   80   %


 


Lymphocytes %   11   %


 


Monocytes %   8   %


 


Eosinophils %   1   %


 


Basophils %   0   %


 


Neutrophils #   5.4   (1.3-7.7)  k/uL


 


Lymphocytes #   0.7 L   (1.0-4.8)  k/uL


 


Monocytes #   0.5   (0-1.0)  k/uL


 


Eosinophils #   0.0   (0-0.7)  k/uL


 


Basophils #   0.0   (0-0.2)  k/uL


 


PT    10.9  (9.0-12.0)  sec


 


INR    1.1  (<1.2)  


 


APTT    24.8  (22.0-30.0)  sec


 


Sodium     (137-145)  mmol/L


 


Potassium     (3.5-5.1)  mmol/L


 


Chloride     ()  mmol/L


 


Carbon Dioxide     (22-30)  mmol/L


 


Anion Gap     mmol/L


 


BUN     (9-20)  mg/dL


 


Creatinine     (0.66-1.25)  mg/dL


 


Est GFR (CKD-EPI)AfAm     (>60 ml/min/1.73 sqM)  


 


Est GFR (CKD-EPI)NonAf     (>60 ml/min/1.73 sqM)  


 


Glucose     (74-99)  mg/dL


 


POC Glucose (mg/dL)  70 L    (75-99)  mg/dL


 


POC Glu Operater ID  Mohawk, Chey    


 


Plasma Lactic Acid Jesse     (0.7-2.0)  mmol/L


 


Calcium     (8.4-10.2)  mg/dL


 


Phosphorus     (2.5-4.5)  mg/dL


 


Magnesium     (1.6-2.3)  mg/dL


 


Total Bilirubin     (0.2-1.3)  mg/dL


 


AST     (17-59)  U/L


 


ALT     (4-49)  U/L


 


Alkaline Phosphatase     ()  U/L


 


Creatine Kinase     ()  U/L


 


Troponin I     (0.000-0.034)  ng/mL


 


NT-Pro-B Natriuret Pep     pg/mL


 


Total Protein     (6.3-8.2)  g/dL


 


Albumin     (3.5-5.0)  g/dL


 


TSH     (0.465-4.680)  mIU/L














  06/26/20 06/26/20 06/26/20 Range/Units





  14:55 14:55 14:55 


 


WBC     (3.8-10.6)  k/uL


 


RBC     (4.30-5.90)  m/uL


 


Hgb     (13.0-17.5)  gm/dL


 


Hct     (39.0-53.0)  %


 


MCV     (80.0-100.0)  fL


 


MCH     (25.0-35.0)  pg


 


MCHC     (31.0-37.0)  g/dL


 


RDW     (11.5-15.5)  %


 


Plt Count     (150-450)  k/uL


 


Neutrophils %     %


 


Lymphocytes %     %


 


Monocytes %     %


 


Eosinophils %     %


 


Basophils %     %


 


Neutrophils #     (1.3-7.7)  k/uL


 


Lymphocytes #     (1.0-4.8)  k/uL


 


Monocytes #     (0-1.0)  k/uL


 


Eosinophils #     (0-0.7)  k/uL


 


Basophils #     (0-0.2)  k/uL


 


PT     (9.0-12.0)  sec


 


INR     (<1.2)  


 


APTT     (22.0-30.0)  sec


 


Sodium  136 L    (137-145)  mmol/L


 


Potassium  3.6    (3.5-5.1)  mmol/L


 


Chloride  104    ()  mmol/L


 


Carbon Dioxide  25    (22-30)  mmol/L


 


Anion Gap  7    mmol/L


 


BUN  31 H    (9-20)  mg/dL


 


Creatinine  1.18    (0.66-1.25)  mg/dL


 


Est GFR (CKD-EPI)AfAm  69    (>60 ml/min/1.73 sqM)  


 


Est GFR (CKD-EPI)NonAf  60    (>60 ml/min/1.73 sqM)  


 


Glucose  65 L    (74-99)  mg/dL


 


POC Glucose (mg/dL)     (75-99)  mg/dL


 


POC Glu Operater ID     


 


Plasma Lactic Acid Jesse   1.0   (0.7-2.0)  mmol/L


 


Calcium  7.9 L    (8.4-10.2)  mg/dL


 


Phosphorus  3.8    (2.5-4.5)  mg/dL


 


Magnesium  1.8    (1.6-2.3)  mg/dL


 


Total Bilirubin  0.8    (0.2-1.3)  mg/dL


 


AST  39    (17-59)  U/L


 


ALT  12    (4-49)  U/L


 


Alkaline Phosphatase  94    ()  U/L


 


Creatine Kinase  635 H    ()  U/L


 


Troponin I    0.138 H*  (0.000-0.034)  ng/mL


 


NT-Pro-B Natriuret Pep     pg/mL


 


Total Protein  5.6 L    (6.3-8.2)  g/dL


 


Albumin  2.4 L    (3.5-5.0)  g/dL


 


TSH  3.020    (0.465-4.680)  mIU/L














  06/26/20 06/26/20 06/26/20 Range/Units





  14:55 15:12 15:13 


 


WBC     (3.8-10.6)  k/uL


 


RBC     (4.30-5.90)  m/uL


 


Hgb     (13.0-17.5)  gm/dL


 


Hct     (39.0-53.0)  %


 


MCV     (80.0-100.0)  fL


 


MCH     (25.0-35.0)  pg


 


MCHC     (31.0-37.0)  g/dL


 


RDW     (11.5-15.5)  %


 


Plt Count     (150-450)  k/uL


 


Neutrophils %     %


 


Lymphocytes %     %


 


Monocytes %     %


 


Eosinophils %     %


 


Basophils %     %


 


Neutrophils #     (1.3-7.7)  k/uL


 


Lymphocytes #     (1.0-4.8)  k/uL


 


Monocytes #     (0-1.0)  k/uL


 


Eosinophils #     (0-0.7)  k/uL


 


Basophils #     (0-0.2)  k/uL


 


PT     (9.0-12.0)  sec


 


INR     (<1.2)  


 


APTT     (22.0-30.0)  sec


 


Sodium     (137-145)  mmol/L


 


Potassium     (3.5-5.1)  mmol/L


 


Chloride     ()  mmol/L


 


Carbon Dioxide     (22-30)  mmol/L


 


Anion Gap     mmol/L


 


BUN     (9-20)  mg/dL


 


Creatinine     (0.66-1.25)  mg/dL


 


Est GFR (CKD-EPI)AfAm     (>60 ml/min/1.73 sqM)  


 


Est GFR (CKD-EPI)NonAf     (>60 ml/min/1.73 sqM)  


 


Glucose     (74-99)  mg/dL


 


POC Glucose (mg/dL)   72 L  76  (75-99)  mg/dL


 


POC Glu Operater ID   Mohawk, Chey  Mohawk, Chey  


 


Plasma Lactic Acid Jesse     (0.7-2.0)  mmol/L


 


Calcium     (8.4-10.2)  mg/dL


 


Phosphorus     (2.5-4.5)  mg/dL


 


Magnesium     (1.6-2.3)  mg/dL


 


Total Bilirubin     (0.2-1.3)  mg/dL


 


AST     (17-59)  U/L


 


ALT     (4-49)  U/L


 


Alkaline Phosphatase     ()  U/L


 


Creatine Kinase     ()  U/L


 


Troponin I     (0.000-0.034)  ng/mL


 


NT-Pro-B Natriuret Pep  40606    pg/mL


 


Total Protein     (6.3-8.2)  g/dL


 


Albumin     (3.5-5.0)  g/dL


 


TSH     (0.465-4.680)  mIU/L














- Radiology Data


Radiology results: report reviewed (Computed tomography scan of the brain shows 

degenerative and nonspecific white matter changes.), image reviewed (Two-view 

chest x-ray shows no acute process)





Disposition


Clinical Impression: 


 Congestive heart failure





Disposition: ADMITTED AS IP TO THIS [facility]


Condition: Serious


Is patient prescribed a controlled substance at d/c from ED?: No


Referrals: 


Merry Grimaldo MD [Primary Care Provider] - 1-2 days


Decision Time: 16:22

## 2020-06-26 NOTE — CT
EXAMINATION TYPE: CT brain wo con

 

DATE OF EXAM: 6/26/2020

 

COMPARISON: None

 

HISTORY: Weakness.

 

CT DLP: 1151.4 mGycm

Automated exposure control for dose reduction was used.

 

FINDINGS: 

There is moderate generalized degenerative change. Low-attenuation throughout the white matter which 
is nonspecific. Intracranial atherosclerotic changes are seen. There is calcification within the left
 basal ganglia.

Hyperostosis of the calvarium. No acute hemorrhage or mass effect. No midline shift. Abnormal low att
enuation in the hari suggestive of remote ischemic change.

 

 

IMPRESSION: 

DEGENERATIVE AND NONSPECIFIC WHITE MATTER CHANGES MOST TYPICAL OF REMOTE MICROVASCULAR ISCHEMIA. KATIE
ELATE WITH MRI IF THERE IS CONCERN FOR ACUTE ISCHEMIA AS CLINICALLY WARRANTED.

## 2020-06-26 NOTE — XR
EXAMINATION TYPE: XR chest 2V

 

DATE OF EXAM: 6/26/2020

 

COMPARISON: 3/2/2020

 

TECHNIQUE: PA and lateral views submitted.

 

HISTORY: Weakness

 

FINDINGS:

The lungs are clear and  there is no pneumothorax, pleural effusion, or focal pneumonia.  Heart is en
larged and there is atherosclerotic changes aorta. Arthropathy of the shoulders. No overt failure.

 

IMPRESSION: 

1. No acute process.

## 2020-06-27 LAB
ALBUMIN SERPL-MCNC: 2.3 G/DL (ref 3.5–5)
ALP SERPL-CCNC: 93 U/L (ref 38–126)
ALT SERPL-CCNC: 12 U/L (ref 4–49)
ANION GAP SERPL CALC-SCNC: 6 MMOL/L
AST SERPL-CCNC: 33 U/L (ref 17–59)
BASOPHILS # BLD AUTO: 0 K/UL (ref 0–0.2)
BASOPHILS NFR BLD AUTO: 1 %
BUN SERPL-SCNC: 30 MG/DL (ref 9–20)
CALCIUM SPEC-MCNC: 7.8 MG/DL (ref 8.4–10.2)
CHLORIDE SERPL-SCNC: 104 MMOL/L (ref 98–107)
CO2 SERPL-SCNC: 27 MMOL/L (ref 22–30)
EOSINOPHIL # BLD AUTO: 0 K/UL (ref 0–0.7)
EOSINOPHIL NFR BLD AUTO: 1 %
ERYTHROCYTE [DISTWIDTH] IN BLOOD BY AUTOMATED COUNT: 3.63 M/UL (ref 4.3–5.9)
ERYTHROCYTE [DISTWIDTH] IN BLOOD: 14.5 % (ref 11.5–15.5)
GLUCOSE BLD-MCNC: 101 MG/DL (ref 75–99)
GLUCOSE BLD-MCNC: 111 MG/DL (ref 75–99)
GLUCOSE BLD-MCNC: 112 MG/DL (ref 75–99)
GLUCOSE BLD-MCNC: 117 MG/DL (ref 75–99)
GLUCOSE BLD-MCNC: 163 MG/DL (ref 75–99)
GLUCOSE BLD-MCNC: 43 MG/DL (ref 75–99)
GLUCOSE BLD-MCNC: 70 MG/DL (ref 75–99)
GLUCOSE SERPL-MCNC: 83 MG/DL (ref 74–99)
HCT VFR BLD AUTO: 30.2 % (ref 39–53)
HGB BLD-MCNC: 9.3 GM/DL (ref 13–17.5)
LYMPHOCYTES # SPEC AUTO: 0.4 K/UL (ref 1–4.8)
LYMPHOCYTES NFR SPEC AUTO: 13 %
MCH RBC QN AUTO: 25.8 PG (ref 25–35)
MCHC RBC AUTO-ENTMCNC: 31 G/DL (ref 31–37)
MCV RBC AUTO: 83.2 FL (ref 80–100)
MONOCYTES # BLD AUTO: 0.3 K/UL (ref 0–1)
MONOCYTES NFR BLD AUTO: 9 %
NEUTROPHILS # BLD AUTO: 2.3 K/UL (ref 1.3–7.7)
NEUTROPHILS NFR BLD AUTO: 76 %
PLATELET # BLD AUTO: 157 K/UL (ref 150–450)
POTASSIUM SERPL-SCNC: 3.2 MMOL/L (ref 3.5–5.1)
PROT SERPL-MCNC: 5.5 G/DL (ref 6.3–8.2)
SODIUM SERPL-SCNC: 137 MMOL/L (ref 137–145)
WBC # BLD AUTO: 3 K/UL (ref 3.8–10.6)

## 2020-06-27 RX ADMIN — FUROSEMIDE SCH MG: 40 TABLET ORAL at 15:35

## 2020-06-27 RX ADMIN — CARVEDILOL SCH MG: 6.25 TABLET, FILM COATED ORAL at 20:43

## 2020-06-27 RX ADMIN — ASPIRIN 81 MG CHEWABLE TABLET SCH MG: 81 TABLET CHEWABLE at 09:42

## 2020-06-27 RX ADMIN — SODIUM CHLORIDE, PRESERVATIVE FREE SCH ML: 5 INJECTION INTRAVENOUS at 20:43

## 2020-06-27 RX ADMIN — CARVEDILOL SCH MG: 6.25 TABLET, FILM COATED ORAL at 08:00

## 2020-06-27 RX ADMIN — Medication SCH MG: at 08:00

## 2020-06-27 RX ADMIN — SPIRONOLACTONE SCH MG: 25 TABLET, FILM COATED ORAL at 08:00

## 2020-06-27 RX ADMIN — TAMSULOSIN HYDROCHLORIDE SCH MG: 0.4 CAPSULE ORAL at 20:43

## 2020-06-27 RX ADMIN — ASPIRIN 81 MG CHEWABLE TABLET SCH MG: 81 TABLET CHEWABLE at 10:42

## 2020-06-27 RX ADMIN — INSULIN ASPART SCH: 100 INJECTION, SOLUTION INTRAVENOUS; SUBCUTANEOUS at 06:10

## 2020-06-27 RX ADMIN — INSULIN DETEMIR SCH UNIT: 100 INJECTION, SOLUTION SUBCUTANEOUS at 11:40

## 2020-06-27 RX ADMIN — INSULIN ASPART SCH: 100 INJECTION, SOLUTION INTRAVENOUS; SUBCUTANEOUS at 17:24

## 2020-06-27 RX ADMIN — INSULIN ASPART SCH: 100 INJECTION, SOLUTION INTRAVENOUS; SUBCUTANEOUS at 20:39

## 2020-06-27 RX ADMIN — ISOSORBIDE MONONITRATE SCH MG: 30 TABLET, EXTENDED RELEASE ORAL at 08:00

## 2020-06-27 RX ADMIN — FUROSEMIDE SCH MG: 10 INJECTION, SOLUTION INTRAMUSCULAR; INTRAVENOUS at 08:00

## 2020-06-27 RX ADMIN — SODIUM CHLORIDE, PRESERVATIVE FREE SCH ML: 5 INJECTION INTRAVENOUS at 08:01

## 2020-06-27 RX ADMIN — LISINOPRIL SCH MG: 20 TABLET ORAL at 08:00

## 2020-06-27 RX ADMIN — HEPARIN SODIUM SCH MLS/HR: 10000 INJECTION, SOLUTION INTRAVENOUS at 10:42

## 2020-06-27 RX ADMIN — INSULIN ASPART SCH UNIT: 100 INJECTION, SOLUTION INTRAVENOUS; SUBCUTANEOUS at 13:20

## 2020-06-27 RX ADMIN — ATORVASTATIN CALCIUM SCH MG: 80 TABLET, FILM COATED ORAL at 20:43

## 2020-06-27 RX ADMIN — CYANOCOBALAMIN TAB 500 MCG SCH MCG: 500 TAB at 08:00

## 2020-06-27 NOTE — P.CRDCN
History of Present Illness


Consult date: 06/27/20


Requesting physician: Marlin Tafoya


Consult reason: congestive heart failure


Chief complaint: Generalized weakness


History of present illness: 


This is a 75-year-old gentleman with past medical history significant for 

diabetes, prior CVA, hyperlipidemia, hypertension, obesity, who came to the 

hospital mainly with symptoms of generalized weakness, he also has not been 

eating or drinking at home, patient states he's had several falls within the 

past one week.  He denies any chest discomfort he does get occasionally dizzy 

according to him.  Breathing is overall stable.  He does state that he is more 

swelling in his lower extremities than his normal.  CAT scan of the brain 

performed on arrival here showed degenerative and nonspecific white matter 

changes, most typical of remote microvascular ischemia.  Chest x-ray did not 

reveal any acute process.  EKG shows normal sinus rhythm with a first-degree AV 

block, right bundle branch block pattern.  Blood pressure on arrival here 112/60

with a heart rate in the 60s, 95% on room air.  Blood pressure this morning 

140/60 with a heart rate in the 70s, 94% on room air.  Laboratory data, white 

blood cell count on admission 6.8, 3.0 this morning, hemoglobin 10.6, 9.3 this 

morning.  Platelet count 157.  Sodium 136, potassium 3.6, BUN 31, creatinine 

1.1, troponin 0.13, 0.13, 0.11.  BNP level 14,900.  TSH level 3.0.  A cardiology

consultation has been requested because of the abnormality in troponin as well 

as the elevated BNP level.  On review of the patient's prior admissions, he is 

also noted at that time to have abnormality in troponin.





Past Medical History


Past Medical History: Chest Pain / Angina, Heart Failure, CVA/TIA, Diabetes 

Mellitus, Hyperlipidemia, Hypertension, Osteoarthritis (OA)


Additional Past Medical History / Comment(s): incontinent of stools intermittent

ly. low iron levels, possible GI bleed-dark tarry stools per son, Son stated 

"has had recent falls related to blood sugar going low 60s"-HX 2017 and 2018 had

episodes of CHF approx 7-10 days post receiving flu vaccine,TIA,Type2 IDDM


History of Any Multi-Drug Resistant Organisms: None Reported


Past Surgical History: Hernia Repair


Additional Past Surgical History / Comment(s): 9/11/18 robot assisted 

laprascopic umbilical hernia repair with mesh., 6/10/19 repair recurrent 

incarerated hernia


Past Anesthesia/Blood Transfusion Reactions: No Reported Reaction


Additional Past Anesthesia/Blood Transfusion Reaction / Comment(s): never had 

anesthesia


Past Psychological History: Depression


Additional Psychological History / Comment(s): Pt resides with his spouse.  He 

states he uses no assistive devices.  He no longer drives, his spouse does the 

driving and manages his medications.


Smoking Status: Former smoker


Past Alcohol Use History: None Reported


Additional Past Alcohol Use History / Comment(s): started smoking age teen, 

stopped for 25 years and restarted smoking occasionally/lightly-a pack will last

2 weeks


Past Drug Use History: None Reported





- Past Family History


  ** Mother


Family Medical History: Cancer


Additional Family Medical History / Comment(s): breast cancer





  ** Father


Family Medical History: Unable to Obtain





  ** Sister(s)


Family Medical History: Cancer





  ** Brother(s)


Family Medical History: Diabetes Mellitus





Medications and Allergies


                                Home Medications











 Medication  Instructions  Recorded  Confirmed  Type


 


Benazepril HCl 40 mg PO QAM 12/03/17 06/26/20 History


 


Aspirin 81 mg PO DAILY 12/04/17 06/26/20 History


 


Tamsulosin [Flomax] 0.4 mg PO HS 12/04/17 06/26/20 History


 


Carvedilol [Coreg] 6.25 mg PO BID 09/05/18 06/26/20 History


 


Isosorbide Mononitrate ER [Imdur] 30 mg PO QAM 09/05/18 06/26/20 History


 


Spironolactone [Aldactone] 12.5 mg PO QAM 09/05/18 06/26/20 History


 


Ferrous Sulfate [Iron (65  mg PO DAILY #30 tab 12/03/18 06/26/20 Rx





Elemental)]    


 


Insulin Glargine,Hum.rec.anlog 40 unit SQ QAM 01/08/20 06/26/20 History





[Basaglar Kwikpen U-100]    


 


ALPRAZolam [Xanax] 0.25 mg PO Q6HR PRN  tab 03/07/20 06/26/20 Rx


 


Furosemide [Lasix] 20 mg PO BID  tab 03/07/20 06/26/20 Rx


 


Atorvastatin [Lipitor] 80 mg PO HS 06/26/20 06/26/20 History


 


Cyanocobalamin (Vitamin B-12) 1,000 mcg PO DAILY 06/26/20 06/26/20 History





[Vitamin B-12]    


 


Nitroglycerin Sl Tab (0.3mg) 0.3 mg SUBLINGUAL Q5M PRN 06/26/20 06/26/20 History








                                    Allergies











Allergy/AdvReac Type Severity Reaction Status Date / Time


 


No Known Allergies Allergy   Verified 06/26/20 17:27














Physical Exam


Vitals: 


                                   Vital Signs











  Temp Pulse Pulse Resp BP BP Pulse Ox


 


 06/27/20 08:00  98.6 F   74  18   140/65  94 L


 


 06/27/20 04:00  98.4 F   71  18   133/63  100


 


 06/27/20 00:00  98 F   90  19   131/60  95


 


 06/26/20 21:45  98.2 F   71  19   146/68  94 L


 


 06/26/20 20:57        99


 


 06/26/20 20:45  97.8 F  66   20  116/60   92 L


 


 06/26/20 19:40   70     


 


 06/26/20 17:22   73   18  115/58   95


 


 06/26/20 15:58   64   18  107/70   94 L


 


 06/26/20 15:16   61     


 


 06/26/20 15:06   67     


 


 06/26/20 14:25  97.8 F  64   20  112/64   95








                                Intake and Output











 06/26/20 06/27/20 06/27/20





 22:59 06:59 14:59


 


Intake Total  64.352 0


 


Output Total  325 75


 


Balance  -260.648 -75


 


Intake:   


 


  Intake, IV Titration  64.352 





  Amount   


 


    Heparin Sod,Pork in 0.45%  64.352 





    NaCl 25,000 unit In 0.45   





    % NaCl 1 250ml.bag @ 9.4   





    UNITS/KG/HR 9.977 mls/hr   





    IV .Q24H [facility] Rx#:   





    [id number]   


 


  Oral   0


 


Output:   


 


  Urine  325 75


 


Other:   


 


  Voiding Method  Toilet Toilet


 


  # Voids  2 


 


  Weight 106.141 kg 104.5 kg 











PHYSICAL EXAMINATION: 





GENERAL: 75-year-old gentleman in no acute distress at the time of my 

examination





HEENT: Head is atraumatic, normocephalic.  Pupils equal, round.  Sclera 

anicteric. Conjunctiva are clear.  Mucous membranes of the mouth are moist.  

Neck is supple.  There is no elevated jugular venous pressure.  No carotid  b

ruit is heard.





HEART EXAMINATION: Heart S1, S2 normal.  No murmur or gallop heard.





CHEST EXAMINATION: Lungs are clear with fine crackles to the bases bilaterally





ABDOMEN:  Soft, obese, nontender. Bowel sounds are heard. No organomegaly noted.


 


EXTREMITIES: 2+ peripheral pulses with 1-2+  evidence of peripheral edema and no

calf tenderness noted.





NEUROLOGIC patient is awake, alert and oriented 2.


 


.


 











Results





                                 06/27/20 07:57





                                 06/26/20 14:55


                                 Cardiac Enzymes











  06/26/20 06/26/20 06/26/20 Range/Units





  14:55 14:55 17:45 


 


AST  39    (17-59)  U/L


 


Troponin I   0.138 H*  0.138 H*  (0.000-0.034)  ng/mL














  06/26/20 Range/Units





  21:12 


 


AST   (17-59)  U/L


 


Troponin I  0.118 H*  (0.000-0.034)  ng/mL








                                   Coagulation











  06/26/20 06/26/20 06/27/20 Range/Units





  14:55 23:24 07:57 


 


PT  10.9    (9.0-12.0)  sec


 


APTT  24.8  27.3  31.5 H  (22.0-30.0)  sec








                                       CBC











  06/26/20 06/27/20 Range/Units





  14:55 07:57 


 


WBC  6.8  3.0 L  (3.8-10.6)  k/uL


 


RBC  4.01 L  3.63 L  (4.30-5.90)  m/uL


 


Hgb  10.6 L  9.3 L  (13.0-17.5)  gm/dL


 


Hct  33.3 L  30.2 L  (39.0-53.0)  %


 


Plt Count  193  157  (150-450)  k/uL








                          Comprehensive Metabolic Panel











  06/26/20 Range/Units





  14:55 


 


Sodium  136 L  (137-145)  mmol/L


 


Potassium  3.6  (3.5-5.1)  mmol/L


 


Chloride  104  ()  mmol/L


 


Carbon Dioxide  25  (22-30)  mmol/L


 


BUN  31 H  (9-20)  mg/dL


 


Creatinine  1.18  (0.66-1.25)  mg/dL


 


Glucose  65 L  (74-99)  mg/dL


 


Calcium  7.9 L  (8.4-10.2)  mg/dL


 


AST  39  (17-59)  U/L


 


ALT  12  (4-49)  U/L


 


Alkaline Phosphatase  94  ()  U/L


 


Total Protein  5.6 L  (6.3-8.2)  g/dL


 


Albumin  2.4 L  (3.5-5.0)  g/dL








                               Current Medications











Generic Name Dose Route Start Last Admin





  Trade Name Freq  PRN Reason Stop Dose Admin


 


Acetaminophen  500 mg  06/26/20 23:51 





  Tylenol Tab  PO  





  Q6HR PRN  





  Pain  


 


Aspirin  81 mg  06/27/20 09:00  06/27/20 09:42





  Aspirin  PO   81 mg





  DAILY SOURAV   Administration


 


Atorvastatin Calcium  80 mg  06/27/20 21:00 





  Lipitor  PO  





  HS SOURAV  


 


Carvedilol  6.25 mg  06/27/20 09:00  06/27/20 08:00





  Coreg  PO   6.25 mg





  BID SOURAV   Administration


 


Cyanocobalamin  1,000 mcg  06/27/20 09:00  06/27/20 08:00





  Vitamin B-12  PO   1,000 mcg





  DAILY SOURAV   Administration


 


Ferrous Sulfate  325 mg  06/27/20 09:00  06/27/20 08:00





  Feosol  PO   325 mg





  DAILY SOURAV   Administration


 


Furosemide  40 mg  06/26/20 16:30  06/27/20 08:00





  Lasix  IV   40 mg





  Q8H SOU[facility]V   Administration


 


Heparin Sodium (Porcine)  0 unit  06/26/20 16:26 





  Heparin  IV  





  PER PROTOCOL PRN  





  Low PTT  





  Protocol  


 


Heparin Sodium/Sodium Chloride  250 mls @ 9.977 mls/hr  06/26/20 16:30  06/26/20

23:54





  25,000 unit/ Sodium Chloride  IV   12.4 units/kg/hr





  .Q24H S[facility]V   13.161 mls/hr





    Titration





  Protocol  





  9.4 UNITS/KG/HR  


 


Insulin Aspart  0 unit  06/27/20 07:30  06/27/20 06:10





  Novolog  SQ   Not Given





  ACHS SOURAV  





  Protocol  


 


Isosorbide Mononitrate  30 mg  06/27/20 09:00  06/27/20 08:00





  Imdur  PO   30 mg





  QAM SOURAV   Administration


 


Lisinopril  40 mg  06/27/20 09:00  06/27/20 08:00





  Zestril  PO   40 mg





  QAM SOURAV   Administration


 


Sodium Chloride  10 ml  06/26/20 21:00  06/27/20 08:01





  Saline Flush  IV   10 ml





  BID SOURAV   Administration


 


Spironolactone  12.5 mg  06/27/20 09:00  06/27/20 08:00





  Aldactone  PO   12.5 mg





  QAM SOURAV   Administration


 


Tamsulosin HCl  0.4 mg  06/27/20 21:00 





  Flomax  PO  





  HS SOURAV  








                                Intake and Output











 06/26/20 06/27/20 06/27/20





 22:59 06:59 14:59


 


Intake Total  64.352 0


 


Output Total  325 75


 


Balance  -260.648 -75


 


Intake:   


 


  Intake, IV Titration  64.352 





  Amount   


 


    Heparin Sod,Pork in 0.45%  64.352 





    NaCl 25,000 unit In 0.45   





    % NaCl 1 250ml.bag @ 9.4   





    UNITS/KG/HR 9.977 mls/hr   





    IV .Q24H [facility] Rx#:   





    [id number]   


 


  Oral   0


 


Output:   


 


  Urine  325 75


 


Other:   


 


  Voiding Method  Toilet Toilet


 


  # Voids  2 


 


  Weight 106.141 kg 104.5 kg 








                                        





                                 06/27/20 07:57 





                                 06/26/20 14:55 











EKG Interpretations (text)


EKG shows a normal sinus rhythm with a first-degree AV block and right bundle 

branch block pattern, occasional PAC








Assessment and Plan


Plan: 


Assessment and plan


#1 generalized weakness, frequent falls.  No clear-cut evidence of syncope


#2 abnormality in troponin, not consistent with acute coronary syndrome, with no

significant rise and fall pattern, is also noted on patient's prior admissions 

that he does have abnormality in troponin.  He denies any chest discomfort.


#3 hypertension


#4 prior CVA


#5 diabetes


#6 hyperlipidemia


#7 nicotine dependence


#8 Elevated BNP level, mild congestive heart failure, diastolic acute on 

chronic.


#9 anemia, chronic





Plan


Patient did have an echocardiogram with Doppler study performed in March which 

revealed an ejection fraction of 45-50%.  We will repeat an echo on this 

admission as well.  Check orthostatic heart rate and blood pressure every shift,

monitor for any significant tachycardia or bradycardia arrhythmias.  Further 

recommendations to follow.





DNP note has been reviewed, I agree with a documented findings and plan of care.

 Patient was seen and examined.

## 2020-06-27 NOTE — P.HPIM
History of Present Illness


H&P Date: 06/27/20





Luis Acosta, is a 75-year-old male who presented to [facility] with multiple vague symptoms, patient has generalized weakness, 

shortness of breath, and had multiple falls over the last 2 weeks, patient was 

evaluated in the emergency room, he was afebrile, blood pressure on presentation

112/64 pulse 64 pulse ox 95% on room air, he had bilateral crackles in the lungs

and 2+ bilateral lower extremity edema, laboratory data was significant for 

elevated troponin level of 0.118 and elevated BNP of 14,900, patient also had 

evidence of anemia with hemoglobin of 10.6.  Due to elevated troponin level 

patient was started on IV heparin and was admitted to telemetry floor, patient 

was also started on IV Lasix due to shortness of breath and elevated BNP level.


Patient has a known history of congestive heart failure, echocardiogram done in 

March revealed an ejection fraction of 45-50%, patient also has known history of

diabetes mellitus, hypertension, hyperlipidemia, and chronic anemia.





Past Medical History


Past Medical History: Chest Pain / Angina, Heart Failure, CVA/TIA, Diabetes 

Mellitus, Hyperlipidemia, Hypertension, Osteoarthritis (OA)


Additional Past Medical History / Comment(s): incontinent of stools 

intermittently. low iron levels, possible GI bleed-dark tarry stools per son, 

Son stated "has had recent falls related to blood sugar going low 60s"-HX 2017 

and 2018 had episodes of CHF approx 7-10 days post receiving flu 

vaccine,TIA,Type2 IDDM


History of Any Multi-Drug Resistant Organisms: None Reported


Past Surgical History: Hernia Repair


Additional Past Surgical History / Comment(s): 9/11/18 robot assisted 

laprascopic umbilical hernia repair with mesh., 6/10/19 repair recurrent 

incarerated hernia


Past Anesthesia/Blood Transfusion Reactions: No Reported Reaction


Additional Past Anesthesia/Blood Transfusion Reaction / Comment(s): never had 

anesthesia


Past Psychological History: Depression


Additional Psychological History / Comment(s): Pt resides with his spouse.  He 

states he uses no assistive devices.  He no longer drives, his spouse does the 

driving and manages his medications.


Smoking Status: Former smoker


Past Alcohol Use History: None Reported


Additional Past Alcohol Use History / Comment(s): started smoking age teen, 

stopped for 25 years and restarted smoking occasionally/lightly-a pack will last

2 weeks


Past Drug Use History: None Reported





- Past Family History


  ** Mother


Family Medical History: Cancer


Additional Family Medical History / Comment(s): breast cancer





  ** Father


Family Medical History: Unable to Obtain





  ** Sister(s)


Family Medical History: Cancer





  ** Brother(s)


Family Medical History: Diabetes Mellitus





Medications and Allergies


                                Home Medications











 Medication  Instructions  Recorded  Confirmed  Type


 


Benazepril HCl 40 mg PO QAM 12/03/17 06/26/20 History


 


Aspirin 81 mg PO DAILY 12/04/17 06/26/20 History


 


Tamsulosin [Flomax] 0.4 mg PO HS 12/04/17 06/26/20 History


 


Carvedilol [Coreg] 6.25 mg PO BID 09/05/18 06/26/20 History


 


Isosorbide Mononitrate ER [Imdur] 30 mg PO QAM 09/05/18 06/26/20 History


 


Spironolactone [Aldactone] 12.5 mg PO QAM 09/05/18 06/26/20 History


 


Ferrous Sulfate [Iron (65  mg PO DAILY #30 tab 12/03/18 06/26/20 Rx





Elemental)]    


 


Insulin Glargine,Hum.rec.anlog 40 unit SQ QAM 01/08/20 06/26/20 History





[Basaglar Kwikpen U-100]    


 


ALPRAZolam [Xanax] 0.25 mg PO Q6HR PRN  tab 03/07/20 06/26/20 Rx


 


Furosemide [Lasix] 20 mg PO BID  tab 03/07/20 06/26/20 Rx


 


Atorvastatin [Lipitor] 80 mg PO HS 06/26/20 06/26/20 History


 


Cyanocobalamin (Vitamin B-12) 1,000 mcg PO DAILY 06/26/20 06/26/20 History





[Vitamin B-12]    


 


Nitroglycerin Sl Tab (0.3mg) 0.3 mg SUBLINGUAL Q5M PRN 06/26/20 06/26/20 History








                                    Allergies











Allergy/AdvReac Type Severity Reaction Status Date / Time


 


No Known Allergies Allergy   Verified 06/26/20 17:27














Physical Exam


Vitals: 


                                   Vital Signs











  Temp Pulse Pulse Resp BP BP Pulse Ox


 


 06/27/20 08:00  98.6 F   74  18   140/65  94 L


 


 06/27/20 04:00  98.4 F   71  18   133/63  100


 


 06/27/20 00:00  98 F   90  19   131/60  95


 


 06/26/20 21:45  98.2 F   71  19   146/68  94 L


 


 06/26/20 20:57        99


 


 06/26/20 20:45  97.8 F  66   20  116/60   92 L


 


 06/26/20 19:40   70     


 


 06/26/20 17:22   73   18  115/58   95


 


 06/26/20 15:58   64   18  107/70   94 L


 


 06/26/20 15:16   61     


 


 06/26/20 15:06   67     


 


 06/26/20 14:25  97.8 F  64   20  112/64   95








                                Intake and Output











 06/26/20 06/27/20 06/27/20





 22:59 06:59 14:59


 


Intake Total  64.352 0


 


Output Total  325 75


 


Balance  -260.648 -75


 


Intake:   


 


  Intake, IV Titration  64.352 





  Amount   


 


    Heparin Sod,Pork in 0.45%  64.352 





    NaCl 25,000 unit In 0.45   





    % NaCl 1 250ml.bag @ 9.4   





    UNITS/KG/HR 9.977 mls/hr   





    IV .Q24H [facility] Rx#:   





    [id number]   


 


  Oral   0


 


Output:   


 


  Urine  325 75


 


Other:   


 


  Voiding Method  Toilet Toilet


 


  # Voids  2 


 


  Weight 106.141 kg 104.5 kg 














In general patient is alert and oriented 3 in no apparent distress, he has some

difficulty understanding English, his wife is in the room and answering most of 

the questions


HEENT head normocephalic and atraumatic


Neck is supple no JVD no goiter no lymphadenopathy


Chest exam reveals crackles in both lung bases no wheezing


Cardiac exam reveals regular heart sounds S1 and S2 no gallops no murmurs


Abdomen is soft nontender no organomegaly with normal bowel sounds


Extremity exam reveals no edema no cyanosis or clubbing


Neurological examination reveals no gross focal deficits





Results


CBC & Chem 7: 


                                 06/27/20 07:57





                                 06/26/20 14:55


Labs: 


                  Abnormal Lab Results - Last 24 Hours (Table)











  06/26/20 06/26/20 06/26/20 Range/Units





  14:42 14:55 14:55 


 


WBC     (3.8-10.6)  k/uL


 


RBC   4.01 L   (4.30-5.90)  m/uL


 


Hgb   10.6 L   (13.0-17.5)  gm/dL


 


Hct   33.3 L   (39.0-53.0)  %


 


Lymphocytes #   0.7 L   (1.0-4.8)  k/uL


 


APTT     (22.0-30.0)  sec


 


Sodium    136 L  (137-145)  mmol/L


 


BUN    31 H  (9-20)  mg/dL


 


Glucose    65 L  (74-99)  mg/dL


 


POC Glucose (mg/dL)  70 L    (75-99)  mg/dL


 


Calcium    7.9 L  (8.4-10.2)  mg/dL


 


Creatine Kinase    635 H  ()  U/L


 


Troponin I     (0.000-0.034)  ng/mL


 


Total Protein    5.6 L  (6.3-8.2)  g/dL


 


Albumin    2.4 L  (3.5-5.0)  g/dL


 


Urine Protein     (Negative)  


 


Amorphous Sediment     (None)  /hpf


 


Hyaline Casts     (0-2)  /lpf


 


Urine Mucus     (None)  /hpf














  06/26/20 06/26/20 06/26/20 Range/Units





  14:55 15:12 15:58 


 


WBC     (3.8-10.6)  k/uL


 


RBC     (4.30-5.90)  m/uL


 


Hgb     (13.0-17.5)  gm/dL


 


Hct     (39.0-53.0)  %


 


Lymphocytes #     (1.0-4.8)  k/uL


 


APTT     (22.0-30.0)  sec


 


Sodium     (137-145)  mmol/L


 


BUN     (9-20)  mg/dL


 


Glucose     (74-99)  mg/dL


 


POC Glucose (mg/dL)   72 L   (75-99)  mg/dL


 


Calcium     (8.4-10.2)  mg/dL


 


Creatine Kinase     ()  U/L


 


Troponin I  0.138 H*    (0.000-0.034)  ng/mL


 


Total Protein     (6.3-8.2)  g/dL


 


Albumin     (3.5-5.0)  g/dL


 


Urine Protein    1+ H  (Negative)  


 


Amorphous Sediment    Rare H  (None)  /hpf


 


Hyaline Casts    14 H  (0-2)  /lpf


 


Urine Mucus    Rare H  (None)  /hpf














  06/26/20 06/26/20 06/27/20 Range/Units





  17:45 21:12 02:07 


 


WBC     (3.8-10.6)  k/uL


 


RBC     (4.30-5.90)  m/uL


 


Hgb     (13.0-17.5)  gm/dL


 


Hct     (39.0-53.0)  %


 


Lymphocytes #     (1.0-4.8)  k/uL


 


APTT     (22.0-30.0)  sec


 


Sodium     (137-145)  mmol/L


 


BUN     (9-20)  mg/dL


 


Glucose     (74-99)  mg/dL


 


POC Glucose (mg/dL)    112 H  (75-99)  mg/dL


 


Calcium     (8.4-10.2)  mg/dL


 


Creatine Kinase     ()  U/L


 


Troponin I  0.138 H*  0.118 H*   (0.000-0.034)  ng/mL


 


Total Protein     (6.3-8.2)  g/dL


 


Albumin     (3.5-5.0)  g/dL


 


Urine Protein     (Negative)  


 


Amorphous Sediment     (None)  /hpf


 


Hyaline Casts     (0-2)  /lpf


 


Urine Mucus     (None)  /hpf














  06/27/20 06/27/20 06/27/20 Range/Units





  06:08 07:57 07:57 


 


WBC   3.0 L   (3.8-10.6)  k/uL


 


RBC   3.63 L   (4.30-5.90)  m/uL


 


Hgb   9.3 L   (13.0-17.5)  gm/dL


 


Hct   30.2 L   (39.0-53.0)  %


 


Lymphocytes #   0.4 L   (1.0-4.8)  k/uL


 


APTT    31.5 H  (22.0-30.0)  sec


 


Sodium     (137-145)  mmol/L


 


BUN     (9-20)  mg/dL


 


Glucose     (74-99)  mg/dL


 


POC Glucose (mg/dL)  101 H    (75-99)  mg/dL


 


Calcium     (8.4-10.2)  mg/dL


 


Creatine Kinase     ()  U/L


 


Troponin I     (0.000-0.034)  ng/mL


 


Total Protein     (6.3-8.2)  g/dL


 


Albumin     (3.5-5.0)  g/dL


 


Urine Protein     (Negative)  


 


Amorphous Sediment     (None)  /hpf


 


Hyaline Casts     (0-2)  /lpf


 


Urine Mucus     (None)  /hpf














Assessment and Plan


Plan: 





1.  Acute on chronic systolic and diastolic congestive heart failure with 

exacerbation, given IV Lasix 40 mg every 8 hours in the emergency room, at this 

time will discontinue IV Lasix, and increase oral Lasix dose from 20 mg twice d

aily to 40 mg twice daily, will monitor kidney function closely.





2.  Elevated troponin level, not consistent with acute myocardial infarction, IV

heparin was discontinued by cardiology, no intervention recommended at this 

time.





3.  Generalized weakness with multiple falls, Will consult physical therapy and 

occupational therapy, and assess need for rehabilitation after this admission.





4.  Anemia, will monitor hemoglobin closely, will check stool Hemoccult, and 

assess need for GI workup, will check iron level and folate and vitamin B12 

levels





5.  Underlying history of hyperlipidemia maintained on Lipitor continue





6.  Underlying history of insulin-dependent diabetes mellitus, continue with 

insulin and check hemoglobin A1c





7.  Underlying history of hypertension





8.  Underlying history of benign prostatic hypertrophy maintained on Flomax





9.  Underlying history of anxiety disorder maintained on Xanax





At this time will switch to oral Lasix and monitor kidney function closely


IV heparin was discontinued by cardiology, will use subcu Lovenox for DVT 

prophylaxis


Protonix for GI prophylaxis


Will check stool Hemoccult check anemia workup


Will follow in a.m.

## 2020-06-28 LAB
ALBUMIN SERPL-MCNC: 2.2 G/DL (ref 3.5–5)
ALP SERPL-CCNC: 94 U/L (ref 38–126)
ALT SERPL-CCNC: 12 U/L (ref 4–49)
AMYLASE SERPL-CCNC: 31 U/L (ref 30–110)
ANION GAP SERPL CALC-SCNC: 5 MMOL/L
AST SERPL-CCNC: 25 U/L (ref 17–59)
BASOPHILS # BLD AUTO: 0 K/UL (ref 0–0.2)
BASOPHILS NFR BLD AUTO: 0 %
BUN SERPL-SCNC: 29 MG/DL (ref 9–20)
CALCIUM SPEC-MCNC: 7.4 MG/DL (ref 8.4–10.2)
CHLORIDE SERPL-SCNC: 101 MMOL/L (ref 98–107)
CO2 SERPL-SCNC: 31 MMOL/L (ref 22–30)
EOSINOPHIL # BLD AUTO: 0.1 K/UL (ref 0–0.7)
EOSINOPHIL NFR BLD AUTO: 2 %
ERYTHROCYTE [DISTWIDTH] IN BLOOD BY AUTOMATED COUNT: 3.66 M/UL (ref 4.3–5.9)
ERYTHROCYTE [DISTWIDTH] IN BLOOD: 14.5 % (ref 11.5–15.5)
GLUCOSE BLD-MCNC: 108 MG/DL (ref 75–99)
GLUCOSE BLD-MCNC: 134 MG/DL (ref 75–99)
GLUCOSE BLD-MCNC: 149 MG/DL (ref 75–99)
GLUCOSE BLD-MCNC: 153 MG/DL (ref 75–99)
GLUCOSE BLD-MCNC: 206 MG/DL (ref 75–99)
GLUCOSE BLD-MCNC: 52 MG/DL (ref 75–99)
GLUCOSE BLD-MCNC: 76 MG/DL (ref 75–99)
GLUCOSE SERPL-MCNC: 114 MG/DL (ref 74–99)
HCT VFR BLD AUTO: 30.7 % (ref 39–53)
HGB BLD-MCNC: 9.2 GM/DL (ref 13–17.5)
LYMPHOCYTES # SPEC AUTO: 0.6 K/UL (ref 1–4.8)
LYMPHOCYTES NFR SPEC AUTO: 17 %
MCH RBC QN AUTO: 25.2 PG (ref 25–35)
MCHC RBC AUTO-ENTMCNC: 30 G/DL (ref 31–37)
MCV RBC AUTO: 83.8 FL (ref 80–100)
MONOCYTES # BLD AUTO: 0.4 K/UL (ref 0–1)
MONOCYTES NFR BLD AUTO: 10 %
NEUTROPHILS # BLD AUTO: 2.6 K/UL (ref 1.3–7.7)
NEUTROPHILS NFR BLD AUTO: 68 %
PLATELET # BLD AUTO: 160 K/UL (ref 150–450)
POTASSIUM SERPL-SCNC: 3.7 MMOL/L (ref 3.5–5.1)
PROT SERPL-MCNC: 5.4 G/DL (ref 6.3–8.2)
SODIUM SERPL-SCNC: 137 MMOL/L (ref 137–145)
WBC # BLD AUTO: 3.8 K/UL (ref 3.8–10.6)

## 2020-06-28 RX ADMIN — CYANOCOBALAMIN TAB 500 MCG SCH MCG: 500 TAB at 08:22

## 2020-06-28 RX ADMIN — SPIRONOLACTONE SCH MG: 25 TABLET, FILM COATED ORAL at 08:22

## 2020-06-28 RX ADMIN — CARVEDILOL SCH MG: 6.25 TABLET, FILM COATED ORAL at 20:19

## 2020-06-28 RX ADMIN — ISOSORBIDE MONONITRATE SCH MG: 30 TABLET, EXTENDED RELEASE ORAL at 08:22

## 2020-06-28 RX ADMIN — INSULIN ASPART SCH UNIT: 100 INJECTION, SOLUTION INTRAVENOUS; SUBCUTANEOUS at 18:05

## 2020-06-28 RX ADMIN — LISINOPRIL SCH MG: 20 TABLET ORAL at 08:22

## 2020-06-28 RX ADMIN — ASPIRIN 81 MG CHEWABLE TABLET SCH MG: 81 TABLET CHEWABLE at 08:23

## 2020-06-28 RX ADMIN — POTASSIUM CHLORIDE SCH MEQ: 20 TABLET, EXTENDED RELEASE ORAL at 18:05

## 2020-06-28 RX ADMIN — IOPAMIDOL PRN ML: 612 INJECTION, SOLUTION INTRAVENOUS at 09:59

## 2020-06-28 RX ADMIN — SODIUM CHLORIDE, PRESERVATIVE FREE SCH ML: 5 INJECTION INTRAVENOUS at 08:23

## 2020-06-28 RX ADMIN — Medication SCH MG: at 08:22

## 2020-06-28 RX ADMIN — INSULIN ASPART SCH UNIT: 100 INJECTION, SOLUTION INTRAVENOUS; SUBCUTANEOUS at 12:52

## 2020-06-28 RX ADMIN — CARVEDILOL SCH MG: 6.25 TABLET, FILM COATED ORAL at 08:22

## 2020-06-28 RX ADMIN — POTASSIUM CHLORIDE SCH MEQ: 20 TABLET, EXTENDED RELEASE ORAL at 16:01

## 2020-06-28 RX ADMIN — ASPIRIN 81 MG CHEWABLE TABLET SCH: 81 TABLET CHEWABLE at 08:23

## 2020-06-28 RX ADMIN — SODIUM CHLORIDE, PRESERVATIVE FREE SCH ML: 5 INJECTION INTRAVENOUS at 20:19

## 2020-06-28 RX ADMIN — INSULIN ASPART SCH: 100 INJECTION, SOLUTION INTRAVENOUS; SUBCUTANEOUS at 08:17

## 2020-06-28 RX ADMIN — FUROSEMIDE SCH MG: 40 TABLET ORAL at 16:01

## 2020-06-28 RX ADMIN — IOPAMIDOL PRN ML: 612 INJECTION, SOLUTION INTRAVENOUS at 11:00

## 2020-06-28 RX ADMIN — ATORVASTATIN CALCIUM SCH MG: 80 TABLET, FILM COATED ORAL at 20:19

## 2020-06-28 RX ADMIN — TAMSULOSIN HYDROCHLORIDE SCH MG: 0.4 CAPSULE ORAL at 20:19

## 2020-06-28 RX ADMIN — POTASSIUM CHLORIDE SCH MEQ: 20 TABLET, EXTENDED RELEASE ORAL at 11:00

## 2020-06-28 RX ADMIN — FUROSEMIDE SCH MG: 40 TABLET ORAL at 08:23

## 2020-06-28 RX ADMIN — INSULIN ASPART SCH UNIT: 100 INJECTION, SOLUTION INTRAVENOUS; SUBCUTANEOUS at 20:20

## 2020-06-28 RX ADMIN — INSULIN DETEMIR SCH: 100 INJECTION, SOLUTION SUBCUTANEOUS at 11:29

## 2020-06-28 NOTE — P.PN
Subjective


Progress Note Date: 06/28/20





This is a 75-year-old gentleman with past medical history significant for 

diabetes, prior CVA, hyperlipidemia, hypertension, obesity, who came to the 

hospital mainly with symptoms of generalized weakness, he also has not been 

eating or drinking at home, patient states he's had several falls within the pas

t one week.  He denies any chest discomfort he does get occasionally dizzy 

according to him.  Breathing is overall stable.  He does state that he is more 

swelling in his lower extremities than his normal.  CAT scan of the brain 

performed on arrival here showed degenerative and nonspecific white matter 

changes, most typical of remote microvascular ischemia.  Chest x-ray did not 

reveal any acute process.  EKG shows normal sinus rhythm with a first-degree AV 

block, right bundle branch block pattern.  Blood pressure on arrival here 112/60

with a heart rate in the 60s, 95% on room air.  Blood pressure this morning 

140/60 with a heart rate in the 70s, 94% on room air.  Laboratory data, white 

blood cell count on admission 6.8, 3.0 this morning, hemoglobin 10.6, 9.3 this 

morning.  Platelet count 157.  Sodium 136, potassium 3.6, BUN 31, creatinine 

1.1, troponin 0.13, 0.13, 0.11.  BNP level 14,900.  TSH level 3.0.  A cardiology

consultation has been requested because of the abnormality in troponin as well 

as the elevated BNP level.  On review of the patient's prior admissions, he is 

also noted at that time to have abnormality in troponin.








06/28/2020


Patient was seen and examined this morning, feeling well overall.  Blood 

pressure 136/60, heart rate in the 60s, 94% on room air.  White blood cell count

3.8, hemoglobin 9.2, platelet count 160.  Sodium 137, potassium 3.7, BUN 29, 

creatinine 1.2.  Echocardiogram with Doppler study was performed which revealed 

an ejection fraction of 45-50%, basal inferior and inferior septal wall 

hypokinesia noted.





Objective





- Vital Signs


Vital signs: 


                                   Vital Signs











Temp  98.2 F   06/28/20 11:04


 


Pulse  62   06/28/20 11:04


 


Resp  18   06/28/20 11:04


 


BP  136/67   06/28/20 11:04


 


Pulse Ox  94 L  06/28/20 11:04








                                 Intake & Output











 06/27/20 06/28/20 06/28/20





 18:59 06:59 18:59


 


Intake Total 111.899 1355 240


 


Output Total 75 625 


 


Balance 787.139 875 240


 


Weight 104.5 kg 105.4 kg 


 


Intake:   


 


  Intake, IV Titration 142.139  





  Amount   


 


    Heparin Sod,Pork in 0.45% 142.139  





    NaCl 25,000 unit In 0.45   





    % NaCl 1 250ml.bag @ 9.4   





    UNITS/KG/HR 9.977 mls/hr   





    IV .Q24H [facility] Rx#:   





    [id number]   


 


  Oral 720 1500 240


 


Output:   


 


  Urine 75 625 


 


Other:   


 


  Voiding Method Toilet Toilet Toilet


 


  # Voids  1 1


 


  # Bowel Movements 3 1 














- Exam





PHYSICAL EXAMINATION: 





GENERAL: 75-year-old gentleman in no acute distress at the time of my 

examination





HEENT: Head is atraumatic, normocephalic.  Pupils equal, round.  Sclera 

anicteric. Conjunctiva are clear.  Mucous membranes of the mouth are moist.  N

matthew is supple.  There is no elevated jugular venous pressure.  No carotid  bruit

is heard.





HEART EXAMINATION: Heart S1, S2 normal.  No murmur or gallop heard.





CHEST EXAMINATION: Lungs are clear with fine crackles to the bases bilaterally





ABDOMEN:  Soft, obese, nontender. Bowel sounds are heard. No organomegaly noted.


 


EXTREMITIES: 2+ peripheral pulses with 1-2+  evidence of peripheral edema and no

calf tenderness noted.





NEUROLOGIC patient is awake, alert and oriented 2.


 


.





- Labs


CBC & Chem 7: 


                                 06/28/20 05:44





                                 06/28/20 05:44


Labs: 


                  Abnormal Lab Results - Last 24 Hours (Table)











  06/27/20 06/27/20 06/27/20 Range/Units





  07:57 16:52 20:11 


 


RBC     (4.30-5.90)  m/uL


 


Hgb     (13.0-17.5)  gm/dL


 


Hct     (39.0-53.0)  %


 


MCHC     (31.0-37.0)  g/dL


 


Lymphocytes #     (1.0-4.8)  k/uL


 


Potassium  3.2 L    (3.5-5.1)  mmol/L


 


Carbon Dioxide     (22-30)  mmol/L


 


BUN  30 H    (9-20)  mg/dL


 


Creatinine     (0.66-1.25)  mg/dL


 


Glucose     (74-99)  mg/dL


 


POC Glucose (mg/dL)   111 H  43 L  (75-99)  mg/dL


 


Calcium  7.8 L    (8.4-10.2)  mg/dL


 


Total Protein  5.5 L    (6.3-8.2)  g/dL


 


Albumin  2.3 L    (3.5-5.0)  g/dL














  06/27/20 06/27/20 06/28/20 Range/Units





  20:30 22:34 02:09 


 


RBC     (4.30-5.90)  m/uL


 


Hgb     (13.0-17.5)  gm/dL


 


Hct     (39.0-53.0)  %


 


MCHC     (31.0-37.0)  g/dL


 


Lymphocytes #     (1.0-4.8)  k/uL


 


Potassium     (3.5-5.1)  mmol/L


 


Carbon Dioxide     (22-30)  mmol/L


 


BUN     (9-20)  mg/dL


 


Creatinine     (0.66-1.25)  mg/dL


 


Glucose     (74-99)  mg/dL


 


POC Glucose (mg/dL)  70 L  117 H  49 L  (75-99)  mg/dL


 


Calcium     (8.4-10.2)  mg/dL


 


Total Protein     (6.3-8.2)  g/dL


 


Albumin     (3.5-5.0)  g/dL














  06/28/20 06/28/20 06/28/20 Range/Units





  02:10 04:51 05:44 


 


RBC    3.66 L  (4.30-5.90)  m/uL


 


Hgb    9.2 L  (13.0-17.5)  gm/dL


 


Hct    30.7 L  (39.0-53.0)  %


 


MCHC    30.0 L  (31.0-37.0)  g/dL


 


Lymphocytes #    0.6 L  (1.0-4.8)  k/uL


 


Potassium     (3.5-5.1)  mmol/L


 


Carbon Dioxide     (22-30)  mmol/L


 


BUN     (9-20)  mg/dL


 


Creatinine     (0.66-1.25)  mg/dL


 


Glucose     (74-99)  mg/dL


 


POC Glucose (mg/dL)  52 L  153 H   (75-99)  mg/dL


 


Calcium     (8.4-10.2)  mg/dL


 


Total Protein     (6.3-8.2)  g/dL


 


Albumin     (3.5-5.0)  g/dL














  06/28/20 06/28/20 06/28/20 Range/Units





  05:44 07:26 11:40 


 


RBC     (4.30-5.90)  m/uL


 


Hgb     (13.0-17.5)  gm/dL


 


Hct     (39.0-53.0)  %


 


MCHC     (31.0-37.0)  g/dL


 


Lymphocytes #     (1.0-4.8)  k/uL


 


Potassium     (3.5-5.1)  mmol/L


 


Carbon Dioxide  31 H    (22-30)  mmol/L


 


BUN  29 H    (9-20)  mg/dL


 


Creatinine  1.27 H    (0.66-1.25)  mg/dL


 


Glucose  114 H    (74-99)  mg/dL


 


POC Glucose (mg/dL)   108 H  134 H  (75-99)  mg/dL


 


Calcium  7.4 L    (8.4-10.2)  mg/dL


 


Total Protein  5.4 L    (6.3-8.2)  g/dL


 


Albumin  2.2 L    (3.5-5.0)  g/dL














Assessment and Plan


Plan: 


Assessment and plan


#1 generalized weakness, frequent falls.  No clear-cut evidence of syncope


#2 abnormality in troponin, not consistent with acute coronary syndrome, with no

significant rise and fall pattern, is also noted on patient's prior admissions 

that he does have abnormality in troponin.  He denies any chest discomfort.


#3 hypertension


#4 prior CVA


#5 diabetes


#6 hyperlipidemia


#7 nicotine dependence


#8 Elevated BNP level, mild congestive heart failure, diastolic acute on 

chronic.


#9 anemia, chronic





Plan


Echocardiogram with Doppler study performed at revealed an ejection fraction of 

45-50%, similar to prior echo.  From cardiology's perspective, we'll continue 

the patient on his current medications.  No evidence of any orthostatic.  He did

have 1 run of nonsustained ventricular tachycardia.  We will replace his 

potassium.


DNP note has been reviewed, I agree with a documented findings and plan of care.

 Patient was seen and examined.

## 2020-06-28 NOTE — P.PN
Subjective


Progress Note Date: 06/28/20








Luis Acosta, is a 75-year-old male who presented to [facility] with multiple vague symptoms, patient has generalized weakness, 

shortness of breath, and had multiple falls over the last 2 weeks, patient was 

evaluated in the emergency room, he was afebrile, blood pressure on presentation

112/64 pulse 64 pulse ox 95% on room air, he had bilateral crackles in the lungs

and 2+ bilateral lower extremity edema, laboratory data was significant for 

elevated troponin level of 0.118 and elevated BNP of 14,900, patient also had 

evidence of anemia with hemoglobin of 10.6.  Due to elevated troponin level 

patient was started on IV heparin and was admitted to telemetry floor, patient 

was also started on IV Lasix due to shortness of breath and elevated BNP level.


Patient has a known history of congestive heart failure, echocardiogram done in 

March revealed an ejection fraction of 45-50%, patient also has known history of

diabetes mellitus, hypertension, hyperlipidemia, and chronic anemia.








On 06/28/2020 patient was seen and examined on the medical floor he is alert and

oriented 3 in no apparent distress wife is raising concern about abdominal 

distention and abdominal discomfort.  There is no fever or chills no headache or

dizziness no chest pain no shortness of breath no cough no nausea or vomiting no

burning was urination no frequency or urgency and no hematuria





Objective





- Vital Signs


Vital signs: 


                                   Vital Signs











Temp  98.2 F   06/28/20 11:04


 


Pulse  62   06/28/20 11:04


 


Resp  18   06/28/20 11:04


 


BP  136/67   06/28/20 11:04


 


Pulse Ox  94 L  06/28/20 11:04








                                 Intake & Output











 06/27/20 06/28/20 06/28/20





 18:59 06:59 18:59


 


Intake Total 575.558 4899 240


 


Output Total 75 625 


 


Balance 787.139 875 240


 


Weight 104.5 kg 105.4 kg 


 


Intake:   


 


  Intake, IV Titration 142.139  





  Amount   


 


    Heparin Sod,Pork in 0.45% 142.139  





    NaCl 25,000 unit In 0.45   





    % NaCl 1 250ml.bag @ 9.4   





    UNITS/KG/HR 9.977 mls/hr   





    IV .Q24H [facility] Rx#:   





    [id number]   


 


  Oral 720 1500 240


 


Output:   


 


  Urine 75 625 


 


Other:   


 


  Voiding Method Toilet Toilet Toilet


 


  # Voids  1 1


 


  # Bowel Movements 3 1 














- Exam











In general patient is alert and oriented 3 in no apparent distress, he has some

difficulty understanding English, his wife is in the room and answering most of 

the questions


HEENT head normocephalic and atraumatic


Neck is supple no JVD no goiter no lymphadenopathy


Chest exam reveals crackles in both lung bases no wheezing


Cardiac exam reveals regular heart sounds S1 and S2 no gallops no murmurs


Abdomen is soft nontender no organomegaly with normal bowel sounds


Extremity exam reveals no edema no cyanosis or clubbing


Neurological examination reveals no gross focal deficits








- Labs


CBC & Chem 7: 


                                 06/28/20 05:44





                                 06/28/20 05:44


Labs: 


                  Abnormal Lab Results - Last 24 Hours (Table)











  06/27/20 06/27/20 06/27/20 Range/Units





  07:57 12:00 16:52 


 


RBC     (4.30-5.90)  m/uL


 


Hgb     (13.0-17.5)  gm/dL


 


Hct     (39.0-53.0)  %


 


MCHC     (31.0-37.0)  g/dL


 


Lymphocytes #     (1.0-4.8)  k/uL


 


Potassium  3.2 L    (3.5-5.1)  mmol/L


 


Carbon Dioxide     (22-30)  mmol/L


 


BUN  30 H    (9-20)  mg/dL


 


Creatinine     (0.66-1.25)  mg/dL


 


Glucose     (74-99)  mg/dL


 


POC Glucose (mg/dL)   163 H  111 H  (75-99)  mg/dL


 


Calcium  7.8 L    (8.4-10.2)  mg/dL


 


Total Protein  5.5 L    (6.3-8.2)  g/dL


 


Albumin  2.3 L    (3.5-5.0)  g/dL














  06/27/20 06/27/20 06/27/20 Range/Units





  20:11 20:30 22:34 


 


RBC     (4.30-5.90)  m/uL


 


Hgb     (13.0-17.5)  gm/dL


 


Hct     (39.0-53.0)  %


 


MCHC     (31.0-37.0)  g/dL


 


Lymphocytes #     (1.0-4.8)  k/uL


 


Potassium     (3.5-5.1)  mmol/L


 


Carbon Dioxide     (22-30)  mmol/L


 


BUN     (9-20)  mg/dL


 


Creatinine     (0.66-1.25)  mg/dL


 


Glucose     (74-99)  mg/dL


 


POC Glucose (mg/dL)  43 L  70 L  117 H  (75-99)  mg/dL


 


Calcium     (8.4-10.2)  mg/dL


 


Total Protein     (6.3-8.2)  g/dL


 


Albumin     (3.5-5.0)  g/dL














  06/28/20 06/28/20 06/28/20 Range/Units





  02:09 02:10 04:51 


 


RBC     (4.30-5.90)  m/uL


 


Hgb     (13.0-17.5)  gm/dL


 


Hct     (39.0-53.0)  %


 


MCHC     (31.0-37.0)  g/dL


 


Lymphocytes #     (1.0-4.8)  k/uL


 


Potassium     (3.5-5.1)  mmol/L


 


Carbon Dioxide     (22-30)  mmol/L


 


BUN     (9-20)  mg/dL


 


Creatinine     (0.66-1.25)  mg/dL


 


Glucose     (74-99)  mg/dL


 


POC Glucose (mg/dL)  49 L  52 L  153 H  (75-99)  mg/dL


 


Calcium     (8.4-10.2)  mg/dL


 


Total Protein     (6.3-8.2)  g/dL


 


Albumin     (3.5-5.0)  g/dL














  06/28/20 06/28/20 06/28/20 Range/Units





  05:44 05:44 07:26 


 


RBC  3.66 L    (4.30-5.90)  m/uL


 


Hgb  9.2 L    (13.0-17.5)  gm/dL


 


Hct  30.7 L    (39.0-53.0)  %


 


MCHC  30.0 L    (31.0-37.0)  g/dL


 


Lymphocytes #  0.6 L    (1.0-4.8)  k/uL


 


Potassium     (3.5-5.1)  mmol/L


 


Carbon Dioxide   31 H   (22-30)  mmol/L


 


BUN   29 H   (9-20)  mg/dL


 


Creatinine   1.27 H   (0.66-1.25)  mg/dL


 


Glucose   114 H   (74-99)  mg/dL


 


POC Glucose (mg/dL)    108 H  (75-99)  mg/dL


 


Calcium   7.4 L   (8.4-10.2)  mg/dL


 


Total Protein   5.4 L   (6.3-8.2)  g/dL


 


Albumin   2.2 L   (3.5-5.0)  g/dL














Assessment and Plan


Plan: 





1.  Acute on chronic systolic and diastolic congestive heart failure with 

exacerbation, given IV Lasix 40 mg every 8 hours in the emergency room, at this 

time will discontinue IV Lasix, and increase oral Lasix dose from 20 mg twice 

daily to 40 mg twice daily, will monitor kidney function closely.





2.  Elevated troponin level, not consistent with acute myocardial infarction, IV

heparin was discontinued by cardiology, no intervention recommended at this 

time.





3.  Generalized weakness with multiple falls, Will consult physical therapy and 

occupational therapy, and assess need for rehabilitation after this admission.





4.  Anemia, will monitor hemoglobin closely, will check stool Hemoccult, and 

assess need for GI workup, will check iron level and folate and vitamin B12 

levels





5.  Underlying history of hyperlipidemia maintained on Lipitor continue





6.  Underlying history of insulin-dependent diabetes mellitus, continue with 

insulin and check hemoglobin A1c





7.  Underlying history of hypertension





8.  Underlying history of benign prostatic hypertrophy maintained on Flomax





9.  Underlying history of anxiety disorder maintained on Xanax





10.  Symptoms of abdominal distention and discomfort will check computed 

tomography scan of the abdomen and pelvis Will consult gastroenterology





At this time will switch to oral Lasix and monitor kidney function closely


IV heparin was discontinued by cardiology, will use subcu Lovenox for DVT 

prophylaxis


Protonix for GI prophylaxis


Will check stool Hemoccult check anemia workup


Will follow in a.m.

## 2020-06-28 NOTE — CT
EXAMINATION TYPE: CT abdomen pelvis wo con

 

DATE OF EXAM: 6/28/2020

 

COMPARISON: Previous study dated 3/3/2020.

 

HISTORY: abdominal pain ,distention

 

CT DLP: 1604.2 mGycm

Automated exposure control for dose reduction was used.

 

FINDINGS: Visualized portions of the lungs are clear. No definite pleural fluid is seen. The heart is
 enlarged. There is a 7 mm pericardial effusion anteriorly.

 

Within the abdomen, there is moderate ascites. The liver is mildly prominent measuring 18.4 cm. The g
allbladder is contracted. There is evidence of old granulomatous disease involving the spleen. The sp
becky is enlarged measuring 19 cm.

 

Both adrenal glands appear normal.

 

There is no hydronephrosis or nephrolithiasis.

 

Very Limited views of the pancreas are unremarkable.

 

There is moderate atheromatous calcification of the visualized arterial tree. There is no significant
 retroperitoneal, iliac or inguinal adenopathy.

 

There is an indirect inguinal hernia on the right containing fluid.

 

The bladder is unremarkable.

 

There is focal dilatation of the proximal sigmoid colon. This is very similar in its appearance to th
e previous examination. The appendix appears unremarkable.

 

Small bowel loops are normal.

 

No free air is seen.

 

There is mild, diffuse anasarca involving the lower abdomen and pelvis.

 

There is degenerative disc disease and facet arthropathy in the lower lumbar spine.

 

IMPRESSION: 

1. INTERVAL DEVELOPMENT OF MODERATE ASCITES.

2. CARDIOMEGALY AND A SMALL, 7 MM PERICARDIAL EFFUSION.

3. MILD HEPATOMEGALY.

4. SPLENOMEGALY AND EVIDENCE OF OLD GRANULOMATOUS DISEASE OF THE SPLEEN.

5. INDIRECT RIGHT-SIDED INGUINAL HERNIA CONTAINING FLUID.

6. FOCAL DILATATION OF THE PROXIMAL SIGMOID COLON WITH A TRANSITION POINT WITHIN THE PROXIMAL SIGMOID
. THIS APPEARS UNCHANGED FROM PREVIOUS. 

7. MILD, DIFFUSE ANASARCA. 

8. DEGENERATIVE CHANGES WITHIN THE SPINE.

## 2020-06-29 LAB
ALBUMIN SERPL-MCNC: 2.3 G/DL (ref 3.5–5)
ALP SERPL-CCNC: 89 U/L (ref 38–126)
ALT SERPL-CCNC: 11 U/L (ref 4–49)
ANION GAP SERPL CALC-SCNC: 3 MMOL/L
AST SERPL-CCNC: 19 U/L (ref 17–59)
BASOPHILS # BLD AUTO: 0 K/UL (ref 0–0.2)
BASOPHILS NFR BLD AUTO: 0 %
BUN SERPL-SCNC: 23 MG/DL (ref 9–20)
CALCIUM SPEC-MCNC: 7.5 MG/DL (ref 8.4–10.2)
CHLORIDE SERPL-SCNC: 99 MMOL/L (ref 98–107)
CO2 SERPL-SCNC: 34 MMOL/L (ref 22–30)
EOSINOPHIL # BLD AUTO: 0.1 K/UL (ref 0–0.7)
EOSINOPHIL NFR BLD AUTO: 1 %
ERYTHROCYTE [DISTWIDTH] IN BLOOD BY AUTOMATED COUNT: 3.58 M/UL (ref 4.3–5.9)
ERYTHROCYTE [DISTWIDTH] IN BLOOD: 14.1 % (ref 11.5–15.5)
FERRITIN SERPL-MCNC: 58.8 NG/ML (ref 22–322)
GLUCOSE BLD-MCNC: 124 MG/DL (ref 75–99)
GLUCOSE BLD-MCNC: 128 MG/DL (ref 75–99)
GLUCOSE BLD-MCNC: 146 MG/DL (ref 75–99)
GLUCOSE BLD-MCNC: 168 MG/DL (ref 75–99)
GLUCOSE BLD-MCNC: 197 MG/DL (ref 75–99)
GLUCOSE SERPL-MCNC: 108 MG/DL (ref 74–99)
HCT VFR BLD AUTO: 30 % (ref 39–53)
HGB BLD-MCNC: 9.6 GM/DL (ref 13–17.5)
IRON SERPL-MCNC: 12 UG/DL (ref 65–175)
LYMPHOCYTES # SPEC AUTO: 0.9 K/UL (ref 1–4.8)
LYMPHOCYTES NFR SPEC AUTO: 17 %
MCH RBC QN AUTO: 26.9 PG (ref 25–35)
MCHC RBC AUTO-ENTMCNC: 32.1 G/DL (ref 31–37)
MCV RBC AUTO: 83.8 FL (ref 80–100)
MONOCYTES # BLD AUTO: 0.4 K/UL (ref 0–1)
MONOCYTES NFR BLD AUTO: 8 %
NEUTROPHILS # BLD AUTO: 3.8 K/UL (ref 1.3–7.7)
NEUTROPHILS NFR BLD AUTO: 72 %
PLATELET # BLD AUTO: 156 K/UL (ref 150–450)
POTASSIUM SERPL-SCNC: 4.1 MMOL/L (ref 3.5–5.1)
PROT SERPL-MCNC: 5.5 G/DL (ref 6.3–8.2)
RHEUMATOID FACT SERPL-ACNC: 6 IU/ML (ref 0–15)
SODIUM SERPL-SCNC: 136 MMOL/L (ref 137–145)
TIBC SERPL-MCNC: 238 UG/DL (ref 228–460)
VIT B12 SERPL-MCNC: 346 PG/ML (ref 200–944)
WBC # BLD AUTO: 5.3 K/UL (ref 3.8–10.6)

## 2020-06-29 RX ADMIN — ASPIRIN 81 MG CHEWABLE TABLET SCH MG: 81 TABLET CHEWABLE at 08:37

## 2020-06-29 RX ADMIN — INSULIN DETEMIR SCH: 100 INJECTION, SOLUTION SUBCUTANEOUS at 06:21

## 2020-06-29 RX ADMIN — TAMSULOSIN HYDROCHLORIDE SCH MG: 0.4 CAPSULE ORAL at 21:04

## 2020-06-29 RX ADMIN — Medication SCH MG: at 08:37

## 2020-06-29 RX ADMIN — FUROSEMIDE SCH MG: 40 TABLET ORAL at 08:38

## 2020-06-29 RX ADMIN — INSULIN ASPART SCH UNIT: 100 INJECTION, SOLUTION INTRAVENOUS; SUBCUTANEOUS at 12:17

## 2020-06-29 RX ADMIN — CYANOCOBALAMIN TAB 500 MCG SCH MCG: 500 TAB at 08:37

## 2020-06-29 RX ADMIN — INSULIN ASPART SCH UNIT: 100 INJECTION, SOLUTION INTRAVENOUS; SUBCUTANEOUS at 21:06

## 2020-06-29 RX ADMIN — CARVEDILOL SCH MG: 6.25 TABLET, FILM COATED ORAL at 21:04

## 2020-06-29 RX ADMIN — ISOSORBIDE MONONITRATE SCH MG: 30 TABLET, EXTENDED RELEASE ORAL at 08:37

## 2020-06-29 RX ADMIN — INSULIN ASPART SCH: 100 INJECTION, SOLUTION INTRAVENOUS; SUBCUTANEOUS at 06:21

## 2020-06-29 RX ADMIN — FUROSEMIDE SCH MG: 40 TABLET ORAL at 18:16

## 2020-06-29 RX ADMIN — SPIRONOLACTONE SCH MG: 25 TABLET, FILM COATED ORAL at 08:37

## 2020-06-29 RX ADMIN — ASPIRIN 81 MG CHEWABLE TABLET SCH: 81 TABLET CHEWABLE at 08:38

## 2020-06-29 RX ADMIN — SODIUM CHLORIDE, PRESERVATIVE FREE SCH: 5 INJECTION INTRAVENOUS at 21:08

## 2020-06-29 RX ADMIN — SODIUM CHLORIDE, PRESERVATIVE FREE SCH ML: 5 INJECTION INTRAVENOUS at 08:38

## 2020-06-29 RX ADMIN — ATORVASTATIN CALCIUM SCH MG: 80 TABLET, FILM COATED ORAL at 21:04

## 2020-06-29 RX ADMIN — CARVEDILOL SCH MG: 6.25 TABLET, FILM COATED ORAL at 08:38

## 2020-06-29 RX ADMIN — INSULIN ASPART SCH UNIT: 100 INJECTION, SOLUTION INTRAVENOUS; SUBCUTANEOUS at 18:16

## 2020-06-29 RX ADMIN — LISINOPRIL SCH MG: 20 TABLET ORAL at 08:37

## 2020-06-29 NOTE — P.PN
Subjective


Progress Note Date: 06/29/20





This is a 75-year-old gentleman with past medical history significant for 

diabetes, prior CVA, hyperlipidemia, hypertension, obesity, who came to the 

hospital mainly with symptoms of generalized weakness, he also has not been 

eating or drinking at home, patient states he's had several falls within the pas

t one week.  He denies any chest discomfort he does get occasionally dizzy 

according to him.  Breathing is overall stable.  He does state that he is more 

swelling in his lower extremities than his normal.  CAT scan of the brain 

performed on arrival here showed degenerative and nonspecific white matter 

changes, most typical of remote microvascular ischemia.  Chest x-ray did not 

reveal any acute process.  EKG shows normal sinus rhythm with a first-degree AV 

block, right bundle branch block pattern.  Blood pressure on arrival here 112/60

with a heart rate in the 60s, 95% on room air.  Blood pressure this morning 

140/60 with a heart rate in the 70s, 94% on room air.  Laboratory data, white 

blood cell count on admission 6.8, 3.0 this morning, hemoglobin 10.6, 9.3 this 

morning.  Platelet count 157.  Sodium 136, potassium 3.6, BUN 31, creatinine 

1.1, troponin 0.13, 0.13, 0.11.  BNP level 14,900.  TSH level 3.0.  A cardiology

consultation has been requested because of the abnormality in troponin as well 

as the elevated BNP level.  On review of the patient's prior admissions, he is 

also noted at that time to have abnormality in troponin.








06/28/2020


Patient was seen and examined this morning, feeling well overall.  Blood 

pressure 136/60, heart rate in the 60s, 94% on room air.  White blood cell count

3.8, hemoglobin 9.2, platelet count 160.  Sodium 137, potassium 3.7, BUN 29, 

creatinine 1.2.  Echocardiogram with Doppler study was performed which revealed 

an ejection fraction of 45-50%, basal inferior and inferior septal wall 

hypokinesia noted.








06/29/2020





Patient seen and examined this morning, appears to be more sleepy today, 

breathing is stable.  Weight is down 2 kg, on by mouth Lasix.  Blood pressure 

108/50 with a heart rate in the 70s, 94% on room air.





Objective





- Vital Signs


Vital signs: 


                                   Vital Signs











Temp  98.6 F   06/29/20 08:00


 


Pulse  73   06/29/20 08:00


 


Resp  16   06/29/20 08:00


 


BP  107/53   06/29/20 08:00


 


Pulse Ox  94 L  06/29/20 08:00








                                 Intake & Output











 06/28/20 06/29/20 06/29/20





 18:59 06:59 18:59


 


Intake Total 1080  240


 


Output Total  1350 


 


Balance 1080 -1350 240


 


Weight  103.3 kg 


 


Intake:   


 


  Oral 1080  240


 


Output:   


 


  Urine  1350 


 


Other:   


 


  Voiding Method Toilet Toilet Toilet


 


  # Voids 4 1 1


 


  # Bowel Movements 2 1 1














- Exam





PHYSICAL EXAMINATION: 





GENERAL: 75-year-old gentleman in no acute distress at the time of my 

examination





HEENT: Head is atraumatic, normocephalic.  Pupils equal, round.  Sclera 

anicteric. Conjunctiva are clear.  Mucous membranes of the mouth are moist.  

Neck is supple.  There is no elevated jugular venous pressure.  No carotid  

bruit is heard.





HEART EXAMINATION: Heart S1, S2 normal.  No murmur or gallop heard.





CHEST EXAMINATION: Lungs are clear with fine crackles to the bases bilaterally





ABDOMEN:  Soft, obese, nontender. Bowel sounds are heard. No organomegaly noted.


 


EXTREMITIES: 2+ peripheral pulses with 1-2+  evidence of peripheral edema and no

calf tenderness noted.





NEUROLOGIC patient is awake, alert and oriented 2.


 


.





- Labs


CBC & Chem 7: 


                                 06/29/20 06:35





                                 06/29/20 06:35


Labs: 


                  Abnormal Lab Results - Last 24 Hours (Table)











  06/27/20 06/28/20 06/28/20 Range/Units





  07:57 11:40 17:00 


 


RBC     (4.30-5.90)  m/uL


 


Hgb     (13.0-17.5)  gm/dL


 


Hct     (39.0-53.0)  %


 


Lymphocytes #     (1.0-4.8)  k/uL


 


Sodium     (137-145)  mmol/L


 


Carbon Dioxide     (22-30)  mmol/L


 


BUN     (9-20)  mg/dL


 


Glucose     (74-99)  mg/dL


 


POC Glucose (mg/dL)   134 H  149 H  (75-99)  mg/dL


 


Calcium     (8.4-10.2)  mg/dL


 


Iron  12 L    ()  ug/dL


 


% Saturation  5.04 L    (15.00-50.00)   


 


Total Protein     (6.3-8.2)  g/dL


 


Albumin     (3.5-5.0)  g/dL














  06/28/20 06/29/20 06/29/20 Range/Units





  20:06 02:07 06:16 


 


RBC     (4.30-5.90)  m/uL


 


Hgb     (13.0-17.5)  gm/dL


 


Hct     (39.0-53.0)  %


 


Lymphocytes #     (1.0-4.8)  k/uL


 


Sodium     (137-145)  mmol/L


 


Carbon Dioxide     (22-30)  mmol/L


 


BUN     (9-20)  mg/dL


 


Glucose     (74-99)  mg/dL


 


POC Glucose (mg/dL)  206 H  128 H  124 H  (75-99)  mg/dL


 


Calcium     (8.4-10.2)  mg/dL


 


Iron     ()  ug/dL


 


% Saturation     (15.00-50.00)   


 


Total Protein     (6.3-8.2)  g/dL


 


Albumin     (3.5-5.0)  g/dL














  06/29/20 06/29/20 Range/Units





  06:35 06:35 


 


RBC  3.58 L   (4.30-5.90)  m/uL


 


Hgb  9.6 L   (13.0-17.5)  gm/dL


 


Hct  30.0 L   (39.0-53.0)  %


 


Lymphocytes #  0.9 L   (1.0-4.8)  k/uL


 


Sodium   136 L  (137-145)  mmol/L


 


Carbon Dioxide   34 H  (22-30)  mmol/L


 


BUN   23 H  (9-20)  mg/dL


 


Glucose   108 H  (74-99)  mg/dL


 


POC Glucose (mg/dL)    (75-99)  mg/dL


 


Calcium   7.5 L  (8.4-10.2)  mg/dL


 


Iron    ()  ug/dL


 


% Saturation    (15.00-50.00)   


 


Total Protein   5.5 L  (6.3-8.2)  g/dL


 


Albumin   2.3 L  (3.5-5.0)  g/dL














Assessment and Plan


Plan: 


Assessment and plan


#1 generalized weakness, frequent falls.  No clear-cut evidence of syncope


#2 abnormality in troponin, not consistent with acute coronary syndrome, with no

significant rise and fall pattern, is also noted on patient's prior admissions 

that he does have abnormality in troponin.  He denies any chest discomfort.


#3 hypertension


#4 prior CVA


#5 diabetes


#6 hyperlipidemia


#7 nicotine dependence


#8 Elevated BNP level, mild congestive heart failure, diastolic acute on 

chronic.


#9 anemia, chronic





Plan


Echocardiogram with Doppler study performed at revealed an ejection fraction of 

45-50%, similar to prior echo.  From cardiology's perspective, we'll continue 

the patient on his current medications.  No evidence of any orthostatic. 





DNP note has been reviewed, I agree with a documented findings and plan of care.

 Patient was seen and examined.

## 2020-06-29 NOTE — P.PN
Subjective


Progress Note Date: 06/29/20








Luis Acosta, is a 75-year-old male who presented to [facility] with multiple vague symptoms, patient has generalized weakness, 

shortness of breath, and had multiple falls over the last 2 weeks, patient was 

evaluated in the emergency room, he was afebrile, blood pressure on presentation

112/64 pulse 64 pulse ox 95% on room air, he had bilateral crackles in the lungs

and 2+ bilateral lower extremity edema, laboratory data was significant for 

elevated troponin level of 0.118 and elevated BNP of 14,900, patient also had 

evidence of anemia with hemoglobin of 10.6.  Due to elevated troponin level 

patient was started on IV heparin and was admitted to telemetry floor, patient 

was also started on IV Lasix due to shortness of breath and elevated BNP level.


Patient has a known history of congestive heart failure, echocardiogram done in 

March revealed an ejection fraction of 45-50%, patient also has known history of

diabetes mellitus, hypertension, hyperlipidemia, and chronic anemia.








On 06/28/2020 patient was seen and examined on the medical floor he is alert and

oriented 3 in no apparent distress wife is raising concern about abdominal 

distention and abdominal discomfort.  There is no fever or chills no headache or

dizziness no chest pain no shortness of breath no cough no nausea or vomiting no

burning was urination no frequency or urgency and no hematuria.





On 06/29/2020 patient was seen and examined on the medical floor he is alert and

oriented 3 in no apparent distress computed tomography scan of the abdomen and 

pelvis revealed evidence of ascites he is scheduled for paracentesis today there

is no fever or chills no headache or dizziness no chest pain no shortness of 

breath no cough no nausea or vomiting no abdominal pain no diarrhea no burning 

with urination no frequency or urgency and no hematuria





Objective





- Vital Signs


Vital signs: 


                                   Vital Signs











Temp  96.7 F L  06/29/20 15:28


 


Pulse  66   06/29/20 17:10


 


Resp  16   06/29/20 16:30


 


BP  113/68   06/29/20 17:10


 


Pulse Ox  95   06/29/20 17:10








                                 Intake & Output











 06/28/20 06/29/20 06/29/20





 18:59 06:59 18:59


 


Intake Total 1080  480


 


Output Total  1350 


 


Balance 1080 -1350 480


 


Weight  103.3 kg 


 


Intake:   


 


  Oral 1080  480


 


Output:   


 


  Urine  1350 


 


Other:   


 


  Voiding Method Toilet Toilet Toilet


 


  # Voids 4 1 2


 


  # Bowel Movements 2 1 2














- Exam











In general patient is alert and oriented 3 in no apparent distress, he has some

difficulty understanding English, his wife is in the room and answering most of 

the questions


HEENT head normocephalic and atraumatic


Neck is supple no JVD no goiter no lymphadenopathy


Chest exam reveals crackles in both lung bases no wheezing


Cardiac exam reveals regular heart sounds S1 and S2 no gallops no murmurs


Abdomen is soft nontender no organomegaly with normal bowel sounds


Extremity exam reveals no edema no cyanosis or clubbing


Neurological examination reveals no gross focal deficits








- Labs


CBC & Chem 7: 


                                 06/29/20 06:35





                                 06/29/20 06:35


Labs: 


                  Abnormal Lab Results - Last 24 Hours (Table)











  06/27/20 06/28/20 06/29/20 Range/Units





  07:57 20:06 02:07 


 


RBC     (4.30-5.90)  m/uL


 


Hgb     (13.0-17.5)  gm/dL


 


Hct     (39.0-53.0)  %


 


Lymphocytes #     (1.0-4.8)  k/uL


 


Sodium     (137-145)  mmol/L


 


Carbon Dioxide     (22-30)  mmol/L


 


BUN     (9-20)  mg/dL


 


Glucose     (74-99)  mg/dL


 


POC Glucose (mg/dL)   206 H  128 H  (75-99)  mg/dL


 


Calcium     (8.4-10.2)  mg/dL


 


Iron  12 L    ()  ug/dL


 


% Saturation  5.04 L    (15.00-50.00)   


 


Total Protein     (6.3-8.2)  g/dL


 


Albumin     (3.5-5.0)  g/dL














  06/29/20 06/29/20 06/29/20 Range/Units





  06:16 06:35 06:35 


 


RBC   3.58 L   (4.30-5.90)  m/uL


 


Hgb   9.6 L   (13.0-17.5)  gm/dL


 


Hct   30.0 L   (39.0-53.0)  %


 


Lymphocytes #   0.9 L   (1.0-4.8)  k/uL


 


Sodium    136 L  (137-145)  mmol/L


 


Carbon Dioxide    34 H  (22-30)  mmol/L


 


BUN    23 H  (9-20)  mg/dL


 


Glucose    108 H  (74-99)  mg/dL


 


POC Glucose (mg/dL)  124 H    (75-99)  mg/dL


 


Calcium    7.5 L  (8.4-10.2)  mg/dL


 


Iron     ()  ug/dL


 


% Saturation     (15.00-50.00)   


 


Total Protein    5.5 L  (6.3-8.2)  g/dL


 


Albumin    2.3 L  (3.5-5.0)  g/dL














  06/29/20 Range/Units





  11:47 


 


RBC   (4.30-5.90)  m/uL


 


Hgb   (13.0-17.5)  gm/dL


 


Hct   (39.0-53.0)  %


 


Lymphocytes #   (1.0-4.8)  k/uL


 


Sodium   (137-145)  mmol/L


 


Carbon Dioxide   (22-30)  mmol/L


 


BUN   (9-20)  mg/dL


 


Glucose   (74-99)  mg/dL


 


POC Glucose (mg/dL)  146 H  (75-99)  mg/dL


 


Calcium   (8.4-10.2)  mg/dL


 


Iron   ()  ug/dL


 


% Saturation   (15.00-50.00)   


 


Total Protein   (6.3-8.2)  g/dL


 


Albumin   (3.5-5.0)  g/dL














Assessment and Plan


Plan: 





1.  Acute on chronic systolic and diastolic congestive heart failure with 

exacerbation, given IV Lasix 40 mg every 8 hours in the emergency room, at this 

time will discontinue IV Lasix, and increase oral Lasix dose from 20 mg twice d

aily to 40 mg twice daily, will monitor kidney function closely.





2.  Elevated troponin level, not consistent with acute myocardial infarction, IV

heparin was discontinued by cardiology, no intervention recommended at this 

time.





3.  Generalized weakness with multiple falls, Will consult physical therapy and 

occupational therapy, and assess need for rehabilitation after this admission.





4.  Anemia, will monitor hemoglobin closely, will check stool Hemoccult, and 

assess need for GI workup, will check iron level and folate and vitamin B12 

levels





5.  Underlying history of hyperlipidemia maintained on Lipitor continue





6.  Underlying history of insulin-dependent diabetes mellitus, continue with 

insulin and check hemoglobin A1c





7.  Underlying history of hypertension





8.  Underlying history of benign prostatic hypertrophy maintained on Flomax





9.  Underlying history of anxiety disorder maintained on Xanax





10.  Symptoms of abdominal distention and discomfort will check computed 

tomography scan of the abdomen and pelvis Will consult gastroenterology





At this time will switch to oral Lasix and monitor kidney function closely


IV heparin was discontinued by cardiology, will use subcu Lovenox for DVT 

prophylaxis


Protonix for GI prophylaxis


Will check stool Hemoccult check anemia workup


Will follow in a.m.

## 2020-06-29 NOTE — P.GSCN
History of Present Illness


Consult date: 06/29/20


Reason for Consult: 





Sigmoid colon narrowing


History of present illness: 





This is a 75-year-old male known to the medical service.  Patient has a known 

history of abdominal wall hernia.  The patient has ascites.  His Recent CAT scan

shows evidence of possible sigmoid colon narrowing.  The patient has a known 

right internal hernia.  And ascites.





Past Medical History


Past Medical History: Chest Pain / Angina, Heart Failure, CVA/TIA, Diabetes 

Mellitus, Hyperlipidemia, Hypertension, Osteoarthritis (OA)


Additional Past Medical History / Comment(s): incontinent of stools intermit

tently. low iron levels, possible GI bleed-dark tarry stools per son, Son stated

"has had recent falls related to blood sugar going low 60s"-HX 2017 and 2018 had

episodes of CHF approx 7-10 days post receiving flu vaccine,TIA,Type2 IDDM


History of Any Multi-Drug Resistant Organisms: None Reported


Past Surgical History: Hernia Repair


Additional Past Surgical History / Comment(s): 9/11/18 robot assisted 

laprascopic umbilical hernia repair with mesh., 6/10/19 repair recurrent 

incarerated hernia


Past Anesthesia/Blood Transfusion Reactions: No Reported Reaction


Additional Past Anesthesia/Blood Transfusion Reaction / Comm: never had 

anesthesia


Past Psychological History: Depression


Additional Psychological History / Comment(s): Pt resides with his spouse.  He 

states he uses no assistive devices.  He no longer drives, his spouse does the 

driving and manages his medications.


Smoking Status: Former smoker


Past Alcohol Use History: None Reported


Additional Past Alcohol Use History / Comment(s): started smoking age teen, 

stopped for 25 years and restarted smoking occasionally/lightly-a pack will last

2 weeks


Past Drug Use History: None Reported





- Past Family History


  ** Mother


Family Medical History: Cancer


Additional Family Medical History / Comment(s): breast cancer





  ** Father


Family Medical History: Unable to Obtain





  ** Sister(s)


Family Medical History: Cancer





  ** Brother(s)


Family Medical History: Diabetes Mellitus





Medications and Allergies


                                Home Medications











 Medication  Instructions  Recorded  Confirmed  Type


 


Benazepril HCl 40 mg PO QAM 12/03/17 06/26/20 History


 


Aspirin 81 mg PO DAILY 12/04/17 06/26/20 History


 


Tamsulosin [Flomax] 0.4 mg PO HS 12/04/17 06/26/20 History


 


Carvedilol [Coreg] 6.25 mg PO BID 09/05/18 06/26/20 History


 


Isosorbide Mononitrate ER [Imdur] 30 mg PO QAM 09/05/18 06/26/20 History


 


Spironolactone [Aldactone] 12.5 mg PO QAM 09/05/18 06/26/20 History


 


Ferrous Sulfate [Iron (65  mg PO DAILY #30 tab 12/03/18 06/26/20 Rx





Elemental)]    


 


Insulin Glargine,Hum.rec.anlog 40 unit SQ QAM 01/08/20 06/26/20 History





[Basaglar Kwikpen U-100]    


 


ALPRAZolam [Xanax] 0.25 mg PO Q6HR PRN  tab 03/07/20 06/26/20 Rx


 


Furosemide [Lasix] 20 mg PO BID  tab 03/07/20 06/26/20 Rx


 


Atorvastatin [Lipitor] 80 mg PO HS 06/26/20 06/26/20 History


 


Cyanocobalamin (Vitamin B-12) 1,000 mcg PO DAILY 06/26/20 06/26/20 History





[Vitamin B-12]    


 


Nitroglycerin Sl Tab (0.3mg) 0.3 mg SUBLINGUAL Q5M PRN 06/26/20 06/26/20 History








                                    Allergies











Allergy/AdvReac Type Severity Reaction Status Date / Time


 


No Known Allergies Allergy   Verified 06/26/20 17:27














Surgical - Exam


                                   Vital Signs











Temp Pulse Resp BP Pulse Ox


 


 97.8 F   64   20   112/64   95 


 


 06/26/20 14:25  06/26/20 14:25  06/26/20 14:25  06/26/20 14:25  06/26/20 14:25














- General


well developed, well nourished, no distress





- Eyes


PERRL





- ENT


normal pinna





- Neck


no masses





- Respiratory


normal expansion





- Cardiovascular


Rhythm: regular





- Abdomen





Abdomen is distended.


Abdomen: soft, non tender





Results





- Labs





                                 06/29/20 06:35





                                 06/29/20 06:35


                  Abnormal Lab Results - Last 24 Hours (Table)











  06/27/20 06/28/20 06/28/20 Range/Units





  07:57 17:00 20:06 


 


RBC     (4.30-5.90)  m/uL


 


Hgb     (13.0-17.5)  gm/dL


 


Hct     (39.0-53.0)  %


 


Lymphocytes #     (1.0-4.8)  k/uL


 


Sodium     (137-145)  mmol/L


 


Carbon Dioxide     (22-30)  mmol/L


 


BUN     (9-20)  mg/dL


 


Glucose     (74-99)  mg/dL


 


POC Glucose (mg/dL)   149 H  206 H  (75-99)  mg/dL


 


Calcium     (8.4-10.2)  mg/dL


 


Iron  12 L    ()  ug/dL


 


% Saturation  5.04 L    (15.00-50.00)   


 


Total Protein     (6.3-8.2)  g/dL


 


Albumin     (3.5-5.0)  g/dL














  06/29/20 06/29/20 06/29/20 Range/Units





  02:07 06:16 06:35 


 


RBC    3.58 L  (4.30-5.90)  m/uL


 


Hgb    9.6 L  (13.0-17.5)  gm/dL


 


Hct    30.0 L  (39.0-53.0)  %


 


Lymphocytes #    0.9 L  (1.0-4.8)  k/uL


 


Sodium     (137-145)  mmol/L


 


Carbon Dioxide     (22-30)  mmol/L


 


BUN     (9-20)  mg/dL


 


Glucose     (74-99)  mg/dL


 


POC Glucose (mg/dL)  128 H  124 H   (75-99)  mg/dL


 


Calcium     (8.4-10.2)  mg/dL


 


Iron     ()  ug/dL


 


% Saturation     (15.00-50.00)   


 


Total Protein     (6.3-8.2)  g/dL


 


Albumin     (3.5-5.0)  g/dL














  06/29/20 06/29/20 Range/Units





  06:35 11:47 


 


RBC    (4.30-5.90)  m/uL


 


Hgb    (13.0-17.5)  gm/dL


 


Hct    (39.0-53.0)  %


 


Lymphocytes #    (1.0-4.8)  k/uL


 


Sodium  136 L   (137-145)  mmol/L


 


Carbon Dioxide  34 H   (22-30)  mmol/L


 


BUN  23 H   (9-20)  mg/dL


 


Glucose  108 H   (74-99)  mg/dL


 


POC Glucose (mg/dL)   146 H  (75-99)  mg/dL


 


Calcium  7.5 L   (8.4-10.2)  mg/dL


 


Iron    ()  ug/dL


 


% Saturation    (15.00-50.00)   


 


Total Protein  5.5 L   (6.3-8.2)  g/dL


 


Albumin  2.3 L   (3.5-5.0)  g/dL








                                 Diabetes panel











  06/29/20 Range/Units





  06:35 


 


Sodium  136 L  (137-145)  mmol/L


 


Potassium  4.1  (3.5-5.1)  mmol/L


 


Chloride  99  ()  mmol/L


 


Carbon Dioxide  34 H  (22-30)  mmol/L


 


BUN  23 H  (9-20)  mg/dL


 


Creatinine  1.16  (0.66-1.25)  mg/dL


 


Glucose  108 H  (74-99)  mg/dL


 


Calcium  7.5 L  (8.4-10.2)  mg/dL


 


AST  19  (17-59)  U/L


 


ALT  11  (4-49)  U/L


 


Alkaline Phosphatase  89  ()  U/L


 


Total Protein  5.5 L  (6.3-8.2)  g/dL


 


Albumin  2.3 L  (3.5-5.0)  g/dL








                                  Calcium panel











  06/29/20 Range/Units





  06:35 


 


Calcium  7.5 L  (8.4-10.2)  mg/dL


 


Albumin  2.3 L  (3.5-5.0)  g/dL








                                 Pituitary panel











  06/29/20 Range/Units





  06:35 


 


Sodium  136 L  (137-145)  mmol/L


 


Potassium  4.1  (3.5-5.1)  mmol/L


 


Chloride  99  ()  mmol/L


 


Carbon Dioxide  34 H  (22-30)  mmol/L


 


BUN  23 H  (9-20)  mg/dL


 


Creatinine  1.16  (0.66-1.25)  mg/dL


 


Glucose  108 H  (74-99)  mg/dL


 


Calcium  7.5 L  (8.4-10.2)  mg/dL








                                  Adrenal panel











  06/29/20 Range/Units





  06:35 


 


Sodium  136 L  (137-145)  mmol/L


 


Potassium  4.1  (3.5-5.1)  mmol/L


 


Chloride  99  ()  mmol/L


 


Carbon Dioxide  34 H  (22-30)  mmol/L


 


BUN  23 H  (9-20)  mg/dL


 


Creatinine  1.16  (0.66-1.25)  mg/dL


 


Glucose  108 H  (74-99)  mg/dL


 


Calcium  7.5 L  (8.4-10.2)  mg/dL


 


Total Bilirubin  0.6  (0.2-1.3)  mg/dL


 


AST  19  (17-59)  U/L


 


ALT  11  (4-49)  U/L


 


Alkaline Phosphatase  89  ()  U/L


 


Total Protein  5.5 L  (6.3-8.2)  g/dL


 


Albumin  2.3 L  (3.5-5.0)  g/dL














Assessment and Plan


Assessment: 





Ascites





Inguinal hernia





Questionable sigmoid colon narrowing.  The patient is stooling and tolerating 

regular diet.  At this point recommend observation.  We will follow with you.

## 2020-06-29 NOTE — US
EXAMINATION TYPE: US abdomen limited

 

DATE OF EXAM: 6/29/2020

 

COMPARISON: CT 6/28/2020

 

CLINICAL HISTORY: assess for fluid pocket. Ascites check abdominal pain and distention

 

Abdominal ascites seen within all 4 quadrants with largest pocket within LLQ

 

 

 

IMPRESSION:  Moderate ascites.

## 2020-06-29 NOTE — P.CONS
History of Present Illness





- Reason for Consult


Consult date: 06/29/20


splenomegaly, ascites


Requesting physician: Marlin Tafoya





- Chief Complaint


CHF





- History of Present Illness


Mr. Acosta is a very pleasant 75-year-old male admitted with signs and 

symptoms of congestive heart failure, who we've been asked to see for new 

ascites and splenomegaly, spleen measuring 19 cm on CT of the abdomen and 

pelvis.  Pt denies previous ascites, does not feel his abd is any larger then 

previously, no blood disorders, wt. loss, hepatitis, dysphagia, bleeding, was a 

drinker years ago, may have had a MI in the past.  





Review of Systems





14 point ROS is negative except as stated in HPI   





Past Medical History


Past Medical History: Chest Pain / Angina, Heart Failure, CVA/TIA, Diabetes Me

llitus, Hyperlipidemia, Hypertension, Osteoarthritis (OA)


Additional Past Medical History / Comment(s): incontinent of stools 

intermittently. low iron levels, possible GI bleed-dark tarry stools per son, 

Son stated "has had recent falls related to blood sugar going low 60s"-HX 2017 

and 2018 had episodes of CHF approx 7-10 days post receiving flu 

vaccine,TIA,Type2 IDDM


History of Any Multi-Drug Resistant Organisms: None Reported


Past Surgical History: Hernia Repair


Additional Past Surgical History / Comment(s): 9/11/18 robot assisted 

laprascopic umbilical hernia repair with mesh., 6/10/19 repair recurrent 

incarerated hernia


Past Anesthesia/Blood Transfusion Reactions: No Reported Reaction


Additional Past Anesthesia/Blood Transfusion Reaction / Comm: never had 

anesthesia


Past Psychological History: Depression


Additional Psychological History / Comment(s): Pt resides with his spouse.  He 

states he uses no assistive devices.  He no longer drives, his spouse does the 

driving and manages his medications.


Smoking Status: Former smoker


Past Alcohol Use History: None Reported


Additional Past Alcohol Use History / Comment(s): started smoking age teen, 

stopped for 25 years and restarted smoking occasionally/lightly-a pack will last

2 weeks


Past Drug Use History: None Reported





- Past Family History


  ** Mother


Family Medical History: Cancer


Additional Family Medical History / Comment(s): breast cancer





  ** Father


Family Medical History: Unable to Obtain





  ** Sister(s)


Family Medical History: Cancer





  ** Brother(s)


Family Medical History: Diabetes Mellitus





Medications and Allergies


                                Home Medications











 Medication  Instructions  Recorded  Confirmed  Type


 


Benazepril HCl 40 mg PO QAM 12/03/17 06/26/20 History


 


Aspirin 81 mg PO DAILY 12/04/17 06/26/20 History


 


Tamsulosin [Flomax] 0.4 mg PO HS 12/04/17 06/26/20 History


 


Carvedilol [Coreg] 6.25 mg PO BID 09/05/18 06/26/20 History


 


Isosorbide Mononitrate ER [Imdur] 30 mg PO QAM 09/05/18 06/26/20 History


 


Spironolactone [Aldactone] 12.5 mg PO QAM 09/05/18 06/26/20 History


 


Ferrous Sulfate [Iron (65  mg PO DAILY #30 tab 12/03/18 06/26/20 Rx





Elemental)]    


 


Insulin Glargine,Hum.rec.anlog 40 unit SQ QAM 01/08/20 06/26/20 History





[Basaglar Kwikpen U-100]    


 


ALPRAZolam [Xanax] 0.25 mg PO Q6HR PRN  tab 03/07/20 06/26/20 Rx


 


Furosemide [Lasix] 20 mg PO BID  tab 03/07/20 06/26/20 Rx


 


Atorvastatin [Lipitor] 80 mg PO HS 06/26/20 06/26/20 History


 


Cyanocobalamin (Vitamin B-12) 1,000 mcg PO DAILY 06/26/20 06/26/20 History





[Vitamin B-12]    


 


Nitroglycerin Sl Tab (0.3mg) 0.3 mg SUBLINGUAL Q5M PRN 06/26/20 06/26/20 History








                                    Allergies











Allergy/AdvReac Type Severity Reaction Status Date / Time


 


No Known Allergies Allergy   Verified 06/26/20 17:27














Physical Exam


Vitals: 


                                   Vital Signs











  Temp Pulse Pulse Resp BP BP Pulse Ox


 


 06/29/20 08:00  98.6 F  73   16  107/53   94 L


 


 06/29/20 03:29  98.2 F  66   16   130/74  95


 


 06/28/20 23:51  98.2 F  79   19  135/69   95


 


 06/28/20 20:00  98.1 F  77   19   143/67  96


 


 06/28/20 16:00  98.5 F  61   16  143/68   95


 


 06/28/20 11:04  98.2 F  62  62  16  136/67   94 L








                                Intake and Output











 06/28/20 06/29/20 06/29/20





 22:59 06:59 14:59


 


Intake Total 360  240


 


Output Total 175 1175 


 


Balance 185 -1175 240


 


Intake:   


 


  Oral 360  240


 


Output:   


 


  Urine 175 1175 


 


Other:   


 


  Voiding Method Toilet Toilet 


 


  # Voids 4 1 1


 


  # Bowel Movements 2 1 1


 


  Weight  103.3 kg 














- Constitutional


General appearance: cooperative, mild distress, morbidly obese





- EENT


Eyes: anicteric sclerae, EOMI


ENT: hearing grossly normal, normal oropharynx





- Neck


Neck: no lymphadenopathy





- Respiratory


Respiratory: bilateral: CTA





- Cardiovascular


Rhythm: regular


Heart sounds: normal: S1, S2





- Gastrointestinal


General gastrointestinal: distended, normal bowel sounds, soft





- Integumentary


Integumentary: normal





- Neurologic


Neurologic: CNII-XII intact





- Musculoskeletal


Musculoskeletal: strength equal bilaterally





- Psychiatric


Psychiatric: A&O x's 3, appropriate affect, intact judgment & insight





Results


CBC & Chem 7: 


                                 06/29/20 06:35





                                 06/29/20 06:35


Labs: 


                  Abnormal Lab Results - Last 24 Hours (Table)











  06/28/20 06/28/20 06/28/20 Range/Units





  11:40 17:00 20:06 


 


RBC     (4.30-5.90)  m/uL


 


Hgb     (13.0-17.5)  gm/dL


 


Hct     (39.0-53.0)  %


 


Lymphocytes #     (1.0-4.8)  k/uL


 


Sodium     (137-145)  mmol/L


 


Carbon Dioxide     (22-30)  mmol/L


 


BUN     (9-20)  mg/dL


 


Glucose     (74-99)  mg/dL


 


POC Glucose (mg/dL)  134 H  149 H  206 H  (75-99)  mg/dL


 


Calcium     (8.4-10.2)  mg/dL


 


Total Protein     (6.3-8.2)  g/dL


 


Albumin     (3.5-5.0)  g/dL














  06/29/20 06/29/20 06/29/20 Range/Units





  02:07 06:16 06:35 


 


RBC    3.58 L  (4.30-5.90)  m/uL


 


Hgb    9.6 L  (13.0-17.5)  gm/dL


 


Hct    30.0 L  (39.0-53.0)  %


 


Lymphocytes #    0.9 L  (1.0-4.8)  k/uL


 


Sodium     (137-145)  mmol/L


 


Carbon Dioxide     (22-30)  mmol/L


 


BUN     (9-20)  mg/dL


 


Glucose     (74-99)  mg/dL


 


POC Glucose (mg/dL)  128 H  124 H   (75-99)  mg/dL


 


Calcium     (8.4-10.2)  mg/dL


 


Total Protein     (6.3-8.2)  g/dL


 


Albumin     (3.5-5.0)  g/dL














  06/29/20 Range/Units





  06:35 


 


RBC   (4.30-5.90)  m/uL


 


Hgb   (13.0-17.5)  gm/dL


 


Hct   (39.0-53.0)  %


 


Lymphocytes #   (1.0-4.8)  k/uL


 


Sodium  136 L  (137-145)  mmol/L


 


Carbon Dioxide  34 H  (22-30)  mmol/L


 


BUN  23 H  (9-20)  mg/dL


 


Glucose  108 H  (74-99)  mg/dL


 


POC Glucose (mg/dL)   (75-99)  mg/dL


 


Calcium  7.5 L  (8.4-10.2)  mg/dL


 


Total Protein  5.5 L  (6.3-8.2)  g/dL


 


Albumin  2.3 L  (3.5-5.0)  g/dL











CT scan - abdomen: report reviewed (splenomegaly, ascites, anasarca)


CT scan - pelvis: report reviewed





Assessment and Plan


(1) Bicytopenia


Current Visit: Yes   Status: Acute   Priority: High   Code(s): D75.89 - OTHER 

SPECIFIED DISEASES OF BLOOD AND BLOOD-FORMING ORGANS   SNOMED Code(s): 59409779


   





(2) Splenomegaly


Current Visit: Yes   Status: Acute   Priority: High   Code(s): R16.1 - 

SPLENOMEGALY, NOT ELSEWHERE CLASSIFIED   SNOMED Code(s): 78183900


   


Plan: 


Bicytopenia workup ordered.


Paracentesis requested with cytology, albumin and full cytometry.


Anemia workup ordered.  Patient is not requiring transfusion at this time for 

hemoglobin of 9.6.











Further recommendations to follow


Doctor attests:  I performed a history and physical examination of this patient,

developed impression and plan of care.  Discussed with dictator.  I agree with 

dictators note, documented as a scribe.

## 2020-06-30 LAB
ANION GAP SERPL CALC-SCNC: 5 MMOL/L
CHLORIDE SERPL-SCNC: 94 MMOL/L (ref 98–107)
CO2 SERPL-SCNC: 34 MMOL/L (ref 22–30)
GLUCOSE BLD-MCNC: 179 MG/DL (ref 75–99)
GLUCOSE BLD-MCNC: 185 MG/DL (ref 75–99)
GLUCOSE BLD-MCNC: 188 MG/DL (ref 75–99)
GLUCOSE BLD-MCNC: 242 MG/DL (ref 75–99)
KAPPA LC FREE SER NEPH-MCNC: 7.61 MG/DL (ref 0.33–1.94)
MAGNESIUM SPEC-SCNC: 1.9 MG/DL (ref 1.6–2.3)
POTASSIUM SERPL-SCNC: 4.1 MMOL/L (ref 3.5–5.1)
SODIUM SERPL-SCNC: 133 MMOL/L (ref 137–145)

## 2020-06-30 RX ADMIN — INSULIN ASPART SCH UNIT: 100 INJECTION, SOLUTION INTRAVENOUS; SUBCUTANEOUS at 20:39

## 2020-06-30 RX ADMIN — SODIUM CHLORIDE, PRESERVATIVE FREE SCH: 5 INJECTION INTRAVENOUS at 20:47

## 2020-06-30 RX ADMIN — LISINOPRIL SCH MG: 20 TABLET ORAL at 09:24

## 2020-06-30 RX ADMIN — Medication SCH MG: at 09:24

## 2020-06-30 RX ADMIN — INSULIN DETEMIR SCH UNIT: 100 INJECTION, SOLUTION SUBCUTANEOUS at 06:34

## 2020-06-30 RX ADMIN — CARVEDILOL SCH MG: 6.25 TABLET, FILM COATED ORAL at 09:26

## 2020-06-30 RX ADMIN — INSULIN ASPART SCH UNIT: 100 INJECTION, SOLUTION INTRAVENOUS; SUBCUTANEOUS at 11:59

## 2020-06-30 RX ADMIN — FUROSEMIDE SCH MG: 40 TABLET ORAL at 12:03

## 2020-06-30 RX ADMIN — ATORVASTATIN CALCIUM SCH MG: 80 TABLET, FILM COATED ORAL at 20:39

## 2020-06-30 RX ADMIN — SODIUM FERRIC GLUCONATE COMPLEX SCH MLS/HR: 12.5 INJECTION INTRAVENOUS at 11:58

## 2020-06-30 RX ADMIN — SPIRONOLACTONE SCH MG: 25 TABLET, FILM COATED ORAL at 09:25

## 2020-06-30 RX ADMIN — CARVEDILOL SCH MG: 12.5 TABLET, FILM COATED ORAL at 17:52

## 2020-06-30 RX ADMIN — INSULIN ASPART SCH UNIT: 100 INJECTION, SOLUTION INTRAVENOUS; SUBCUTANEOUS at 17:52

## 2020-06-30 RX ADMIN — TAMSULOSIN HYDROCHLORIDE SCH MG: 0.4 CAPSULE ORAL at 20:39

## 2020-06-30 RX ADMIN — ASPIRIN 81 MG CHEWABLE TABLET SCH MG: 81 TABLET CHEWABLE at 09:26

## 2020-06-30 RX ADMIN — CYANOCOBALAMIN TAB 500 MCG SCH MCG: 500 TAB at 09:26

## 2020-06-30 RX ADMIN — ISOSORBIDE MONONITRATE SCH MG: 30 TABLET, EXTENDED RELEASE ORAL at 09:25

## 2020-06-30 RX ADMIN — SODIUM CHLORIDE, PRESERVATIVE FREE SCH ML: 5 INJECTION INTRAVENOUS at 12:03

## 2020-06-30 RX ADMIN — INSULIN ASPART SCH UNIT: 100 INJECTION, SOLUTION INTRAVENOUS; SUBCUTANEOUS at 06:34

## 2020-06-30 RX ADMIN — ASPIRIN 81 MG CHEWABLE TABLET SCH MG: 81 TABLET CHEWABLE at 09:25

## 2020-06-30 RX ADMIN — FUROSEMIDE SCH MG: 40 TABLET ORAL at 17:52

## 2020-06-30 NOTE — P.PN
Subjective


Progress Note Date: 06/30/20


Principal diagnosis: 





CHF





In follow-up today, pt is sitting up in a chair, he did have the paracentesis, 

he denies any pain or discomfort from the procedure.  We discussed the results 

of his labs, not exactly sure much he understands, he has asked me to get a hold

of his son at some point.  Patient denies any pain or bleeding





Objective





- Vital Signs


Vital signs: 


                                   Vital Signs











Temp  97.5 F L  06/30/20 16:00


 


Pulse  97   06/30/20 16:00


 


Resp  16   06/30/20 16:00


 


BP  111/55   06/30/20 16:00


 


Pulse Ox  97   06/30/20 16:00








                                 Intake & Output











 06/29/20 06/30/20 06/30/20





 18:59 06:59 18:59


 


Intake Total 720 240 720


 


Output Total  550 100


 


Balance 720 -310 620


 


Weight  96.1 kg 


 


Intake:   


 


  Oral 720 240 720


 


Output:   


 


  Urine  550 100


 


Other:   


 


  Voiding Method Toilet Toilet Toilet


 


  # Voids 2 1 1


 


  # Bowel Movements 2  1














- Constitutional


General appearance: Present: cooperative, no acute distress, obese





- EENT


Eyes: Present: anicteric sclerae, EOMI


ENT: Present: hearing grossly normal





- Respiratory


Details: 





mildly labored breathing, no audible abnormal breath sounds, chest expansion 

symmetrical





- Gastrointestinal


General gastrointestinal: Present: distended





- Neurologic


Neurologic: Present: CNII-XII intact





- Musculoskeletal


Musculoskeletal: Present: strength equal bilaterally





- Psychiatric


Psychiatric: Present: A&O x's 3, appropriate affect





- Labs


CBC & Chem 7: 


                                 06/29/20 06:35





                                 06/29/20 06:35


Labs: 


                  Abnormal Lab Results - Last 24 Hours (Table)











  06/29/20 06/29/20 06/29/20 Range/Units





  06:35 17:58 20:31 


 


POC Glucose (mg/dL)   168 H  197 H  (75-99)  mg/dL


 


Total Protein (PEP)  5.3 L    (6.2-8.2)  g/dL


 


Free Fowlkes LC, Quant  7.61 H    (0.33-1.94)  mg/dL


 


Free Lambda LC, Quant  7.60 H    (0.57-2.63)  mg/dL














  06/30/20 06/30/20 06/30/20 Range/Units





  06:07 11:50 16:51 


 


POC Glucose (mg/dL)  179 H  188 H  242 H  (75-99)  mg/dL


 


Total Protein (PEP)     (6.2-8.2)  g/dL


 


Free Fowlkes LC, Quant     (0.33-1.94)  mg/dL


 


Free Lambda LC, Quant     (0.57-2.63)  mg/dL














Assessment and Plan


(1) Bicytopenia


Current Visit: Yes   Status: Acute   Priority: High   Code(s): D75.89 - OTHER 

SPECIFIED DISEASES OF BLOOD AND BLOOD-FORMING ORGANS   SNOMED Code(s): 19335389


   





(2) Splenomegaly


Current Visit: Yes   Status: Acute   Priority: High   Code(s): R16.1 - 

SPLENOMEGALY, NOT ELSEWHERE CLASSIFIED   SNOMED Code(s): 73057919


   


Plan: 


Bicytopenia workup shows no paraproteinemia, inflammatory markers within normal 

limits, no autoimmune markers positive


Paracentesis is transudative when serum/ascitic albumin is calculated and 

interpreted by [facility], most consistent with chronic liver disease and clinical 

picture-splenomegaly and bicytopenia. Pending cytology and flow cytometry.


Anemia workup showing iron deficiency.  Parenteral iron ordered.  GI consulted. 







Recommend follow up with Dr. Guevara 4-6 weeks to reevaluate iron stores and 

bicytopenia.














Doctor attests:  I performed a history and physical examination of this patient,

developed impression and plan of care.  Discussed with dictator.  I agree with 

dictators note, documented as a scribe.

## 2020-06-30 NOTE — P.CONS
History of Present Illness





- Reason for Consult


Consult date: 06/29/20


Abdominal pain


Requesting physician: Marlin Tafoya





- Chief Complaint


Weakness, shortness of breath





- History of Present Illness











75-year-old male with multiple medical comorbidities including diabetes 

mellitus, hypertension, hyperlipidemia, chronic anemia, diastolic and systolic 

heart failure, colonic polyps and gastric and esophageal varices on previous EGD

who presented to the hospital due to complaints of weakness and shortness of 

breath.  The patient had reported increasing weakness and shortness of breath 

prior to presentation with multiple falls over the past few weeks.  Patient was 

admitted to the hospital and is being treated for exacerbation of his underlying

heart failure.  There were some concerns over the patient complaining of 

abdominal pain during his stay.  He is currently denying any abdominal pain and 

tolerating his diet.  The patient previously underwent EGD and colonoscopy in 

01/2020 with findings on EGD of esophageal and gastric varices and polypectomy 

on colonoscopy.  Patient had a computed tomography scan subsequent to this with 

concerns over sigmoid narrowing with flexible sigmoidoscopy performed in 03/2020

significant for diverticulosis, internal hemorrhoids and a poor prep with no 

mass seen.  The patient denies any significant history of alcohol abuse or liver

disease but has had findings of esophageal and gastric varices as well as 

ascites and hepatomegaly suggestive of underlying cirrhosis.











Review of Systems





REVIEW OF SYSTEMS:


CONSTITUTIONAL: Denies any fevers, chills, weight change but has been reporting 

some fatigue.


CARDIOVASCULAR: Denies any chest pain, palpitations high or low blood pressures


RESPIRATORY: Denies any hemoptysis or cough but did report shortness of breath 

on presentation which is improved.  


GENITOURINARY:  No dysuria or hematuria. 


MUSCULOSKELETAL: No focal weakness reported. 


SKIN: Denies any new rashes or lesions, jaundice or pallor. 


PSYCHIATRIC: Denies any depression or anxiety. 


NEUROLOGY: Denies headache, denies any new focal deficits. 


EARS/NOSE/THROAT: No recent hearing change, congestion, nasal discharge or sore 

throat.


EYES: No pain in eyes, discharge or change in vision. 


GASTROINTESTINAL: As per HPI.





Past Medical History


Past Medical History: Chest Pain / Angina, Heart Failure, CVA/TIA, Diabetes 

Mellitus, Hyperlipidemia, Hypertension, Osteoarthritis (OA)


Additional Past Medical History / Comment(s): incontinent of stools 

intermittently. low iron levels, possible GI bleed-dark tarry stools per son, 

Son stated "has had recent falls related to blood sugar going low 60s"-HX 2017 

and 2018 had episodes of CHF approx 7-10 days post receiving flu v

accine,TIA,Type2 IDDM


History of Any Multi-Drug Resistant Organisms: None Reported


Past Surgical History: Hernia Repair


Additional Past Surgical History / Comment(s): 9/11/18 robot assisted 

laprascopic umbilical hernia repair with mesh., 6/10/19 repair recurrent 

incarerated hernia


Past Anesthesia/Blood Transfusion Reactions: No Reported Reaction


Additional Past Anesthesia/Blood Transfusion Reaction / Comm: never had 

anesthesia


Past Psychological History: Depression


Additional Psychological History / Comment(s): Pt resides with his spouse.  He 

states he uses no assistive devices.  He no longer drives, his spouse does the 

driving and manages his medications.


Smoking Status: Former smoker


Past Alcohol Use History: None Reported


Additional Past Alcohol Use History / Comment(s): started smoking age teen, 

stopped for 25 years and restarted smoking occasionally/lightly-a pack will last

2 weeks


Past Drug Use History: None Reported





- Past Family History


  ** Mother


Family Medical History: Cancer


Additional Family Medical History / Comment(s): breast cancer





  ** Father


Family Medical History: Unable to Obtain





  ** Sister(s)


Family Medical History: Cancer





  ** Brother(s)


Family Medical History: Diabetes Mellitus





Medications and Allergies


                                Home Medications











 Medication  Instructions  Recorded  Confirmed  Type


 


Benazepril HCl 40 mg PO QAM 12/03/17 06/26/20 History


 


Aspirin 81 mg PO DAILY 12/04/17 06/26/20 History


 


Tamsulosin [Flomax] 0.4 mg PO HS 12/04/17 06/26/20 History


 


Carvedilol [Coreg] 6.25 mg PO BID 09/05/18 06/26/20 History


 


Isosorbide Mononitrate ER [Imdur] 30 mg PO QAM 09/05/18 06/26/20 History


 


Spironolactone [Aldactone] 12.5 mg PO QAM 09/05/18 06/26/20 History


 


Ferrous Sulfate [Iron (65  mg PO DAILY #30 tab 12/03/18 06/26/20 Rx





Elemental)]    


 


Insulin Glargine,Hum.rec.anlog 40 unit SQ QAM 01/08/20 06/26/20 History





[Tomekaaglar Kwikpen U-100]    


 


ALPRAZolam [Xanax] 0.25 mg PO Q6HR PRN  tab 03/07/20 06/26/20 Rx


 


Furosemide [Lasix] 20 mg PO BID  tab 03/07/20 06/26/20 Rx


 


Atorvastatin [Lipitor] 80 mg PO HS 06/26/20 06/26/20 History


 


Cyanocobalamin (Vitamin B-12) 1,000 mcg PO DAILY 06/26/20 06/26/20 History





[Vitamin B-12]    


 


Nitroglycerin Sl Tab (0.3mg) 0.3 mg SUBLINGUAL Q5M PRN 06/26/20 06/26/20 History








                                    Allergies











Allergy/AdvReac Type Severity Reaction Status Date / Time


 


No Known Allergies Allergy   Verified 06/26/20 17:27














Physical Exam


Vitals: 


                                   Vital Signs











  Temp Pulse Resp BP BP Pulse Ox


 


 06/29/20 12:00  97.9 F  59 L  16  108/54   94 L


 


 06/29/20 08:00  98.6 F  73  16  107/53   94 L


 


 06/29/20 03:29  98.2 F  66  16   130/74  95


 


 06/28/20 23:51  98.2 F  79  19  135/69   95


 


 06/28/20 20:00  98.1 F  77  19   143/67  96


 


 06/28/20 16:00  98.5 F  61  16  143/68   95








                                Intake and Output











 06/28/20 06/29/20 06/29/20





 22:59 06:59 14:59


 


Intake Total 360  240


 


Output Total 175 1175 


 


Balance 185 -1175 240


 


Intake:   


 


  Oral 360  240


 


Output:   


 


  Urine 175 1175 


 


Other:   


 


  Voiding Method Toilet Toilet Toilet


 


  # Voids 4 1 1


 


  # Bowel Movements 2 1 1


 


  Weight  103.3 kg 














On physical examination, patient appears comfortable in no apparent distress. 


HEAD: Normocephalic, atraumatic. 


EYES: No scleral icterus. No conjunctival injection. 


MOUTH: No lesions, tongue midline. 


NECK: Trachea midline, no gross abnormalities. 


CHEST: Decreased air entry in all lung fields. 


HEART: S1-S2 appreciated. 


ABDOMEN: Soft, mildly distended. Bowel sounds are positive. No organomegaly.  No

guarding or rigidity.


EXTREMITIES: No pedal edema. 


SKIN: No rashes, no jaundice. 


NEUROLOGIC: Alert and oriented to person and place with no focal deficits noted.







Results


CBC & Chem 7: 


                                 06/29/20 06:35





                                 06/29/20 06:35


Labs: 


                  Abnormal Lab Results - Last 24 Hours (Table)











  06/27/20 06/28/20 06/28/20 Range/Units





  07:57 17:00 20:06 


 


RBC     (4.30-5.90)  m/uL


 


Hgb     (13.0-17.5)  gm/dL


 


Hct     (39.0-53.0)  %


 


Lymphocytes #     (1.0-4.8)  k/uL


 


Sodium     (137-145)  mmol/L


 


Carbon Dioxide     (22-30)  mmol/L


 


BUN     (9-20)  mg/dL


 


Glucose     (74-99)  mg/dL


 


POC Glucose (mg/dL)   149 H  206 H  (75-99)  mg/dL


 


Calcium     (8.4-10.2)  mg/dL


 


Iron  12 L    ()  ug/dL


 


% Saturation  5.04 L    (15.00-50.00)   


 


Total Protein     (6.3-8.2)  g/dL


 


Albumin     (3.5-5.0)  g/dL














  06/29/20 06/29/20 06/29/20 Range/Units





  02:07 06:16 06:35 


 


RBC    3.58 L  (4.30-5.90)  m/uL


 


Hgb    9.6 L  (13.0-17.5)  gm/dL


 


Hct    30.0 L  (39.0-53.0)  %


 


Lymphocytes #    0.9 L  (1.0-4.8)  k/uL


 


Sodium     (137-145)  mmol/L


 


Carbon Dioxide     (22-30)  mmol/L


 


BUN     (9-20)  mg/dL


 


Glucose     (74-99)  mg/dL


 


POC Glucose (mg/dL)  128 H  124 H   (75-99)  mg/dL


 


Calcium     (8.4-10.2)  mg/dL


 


Iron     ()  ug/dL


 


% Saturation     (15.00-50.00)   


 


Total Protein     (6.3-8.2)  g/dL


 


Albumin     (3.5-5.0)  g/dL














  06/29/20 06/29/20 Range/Units





  06:35 11:47 


 


RBC    (4.30-5.90)  m/uL


 


Hgb    (13.0-17.5)  gm/dL


 


Hct    (39.0-53.0)  %


 


Lymphocytes #    (1.0-4.8)  k/uL


 


Sodium  136 L   (137-145)  mmol/L


 


Carbon Dioxide  34 H   (22-30)  mmol/L


 


BUN  23 H   (9-20)  mg/dL


 


Glucose  108 H   (74-99)  mg/dL


 


POC Glucose (mg/dL)   146 H  (75-99)  mg/dL


 


Calcium  7.5 L   (8.4-10.2)  mg/dL


 


Iron    ()  ug/dL


 


% Saturation    (15.00-50.00)   


 


Total Protein  5.5 L   (6.3-8.2)  g/dL


 


Albumin  2.3 L   (3.5-5.0)  g/dL











US - abdomen: report reviewed (Ascites noted on ultrasound the abdomen.)





Assessment and Plan


(1) Ascites


Narrative/Plan: 


75-year-old male with multiple medical comorbidities presenting to the hospital 

with worsening shortness of breath and currently being treated for an 

exacerbation of underlying diastolic and systolic heart failure.  The patient 

has previously had endoscopic evaluation at the beginning of 2020 with findings 

of small gastric and esophageal varices.  Currently ultrasound of the abdomen 

has shown a ascites with paracentesis ordered.  Suspicion is for underlying 

liver disease, however cannot rule out heart failure as the cause of underlying 

ascites.  Currently patient is receiving diuretic therapy.


Current Visit: Yes   Status: Acute   Code(s): R18.8 - OTHER ASCITES   SNOMED 

Code(s): 271152795


   





(2) Esophageal varices determined by endoscopy


Current Visit: Yes   Status: Acute   Code(s): I85.00 - ESOPHAGEAL VARICES 

WITHOUT BLEEDING   SNOMED Code(s): 52984790


   





(3) Abdominal pain


Narrative/Plan: 


Likely multifactorial, cannot rule out a component of pain from underlying 

distention with ascites.


Current Visit: Yes   Status: Acute   Code(s): R10.9 - UNSPECIFIED ABDOMINAL PAIN

  SNOMED Code(s): 85241318


   


Plan: 





Supportive care


Continue diuresis with Aldactone 12.5 mg daily and Lasix 40 mg twice a day, can 

titrate medications based on electrolytes and kidney function


Continue management by internal medicine and cardiology services


Paracentesis ordered, await fluid studies


Okay for sodium restricted diet as tolerated


Hematology service consult to see the patient


No complaints of abdominal pain at this time, we'll continue to monitor


Thank you for allowing us to participate in the care of this patient

## 2020-06-30 NOTE — P.PN
Progress Note - Text


Progress Note Date: 06/30/20





The patient underwent paracentesis yesterday.  He really has no abdominal 

complaints.





On exam her vital signs are stable.  Abdomen soft.  There is no significant 

tenderness.





Patient will Receive supportive medical care.  No surgical intervention is 

planned at this point.

## 2020-06-30 NOTE — P.PN
Subjective


Progress Note Date: 06/30/20





This is a 75-year-old gentleman with past medical history significant for 

diabetes, prior CVA, hyperlipidemia, hypertension, obesity, who came to the 

hospital mainly with symptoms of generalized weakness, he also has not been 

eating or drinking at home, patient states he's had several falls within the pas

t one week.  He denies any chest discomfort he does get occasionally dizzy 

according to him.  Breathing is overall stable.  He does state that he is more 

swelling in his lower extremities than his normal.  CAT scan of the brain 

performed on arrival here showed degenerative and nonspecific white matter 

changes, most typical of remote microvascular ischemia.  Chest x-ray did not 

reveal any acute process.  EKG shows normal sinus rhythm with a first-degree AV 

block, right bundle branch block pattern.  Blood pressure on arrival here 112/60

with a heart rate in the 60s, 95% on room air.  Blood pressure this morning 

140/60 with a heart rate in the 70s, 94% on room air.  Laboratory data, white 

blood cell count on admission 6.8, 3.0 this morning, hemoglobin 10.6, 9.3 this 

morning.  Platelet count 157.  Sodium 136, potassium 3.6, BUN 31, creatinine 

1.1, troponin 0.13, 0.13, 0.11.  BNP level 14,900.  TSH level 3.0.  A cardiology

consultation has been requested because of the abnormality in troponin as well 

as the elevated BNP level.  On review of the patient's prior admissions, he is 

also noted at that time to have abnormality in troponin.








06/28/2020


Patient was seen and examined this morning, feeling well overall.  Blood 

pressure 136/60, heart rate in the 60s, 94% on room air.  White blood cell count

3.8, hemoglobin 9.2, platelet count 160.  Sodium 137, potassium 3.7, BUN 29, 

creatinine 1.2.  Echocardiogram with Doppler study was performed which revealed 

an ejection fraction of 45-50%, basal inferior and inferior septal wall 

hypokinesia noted.








06/29/2020





Patient seen and examined this morning, appears to be more sleepy today, 

breathing is stable.  Weight is down 2 kg, on by mouth Lasix.  Blood pressure 

108/50 with a heart rate in the 70s, 94% on room air.








06/30/2020





Patient seen and examined this morning, he underwent a paracentesis yesterday.  

Blood pressure this morning 114/50 with a heart rate in the 60s, 94% on room 

air.





Objective





- Vital Signs


Vital signs: 


                                   Vital Signs











Temp  97 F L  06/30/20 08:00


 


Pulse  60   06/30/20 08:00


 


Resp  18   06/30/20 08:00


 


BP  114/58   06/30/20 08:00


 


Pulse Ox  94 L  06/30/20 08:00








                                 Intake & Output











 06/29/20 06/30/20 06/30/20





 18:59 06:59 18:59


 


Intake Total 720 240 240


 


Output Total  550 100


 


Balance 720 -310 140


 


Weight  96.1 kg 


 


Intake:   


 


  Oral 720 240 240


 


Output:   


 


  Urine  550 100


 


Other:   


 


  Voiding Method Toilet Toilet Toilet


 


  # Voids 2 1 1


 


  # Bowel Movements 2  1














- Exam





PHYSICAL EXAMINATION: 





GENERAL: 75-year-old gentleman in no acute distress at the time of my 

examination





HEENT: Head is atraumatic, normocephalic.  Pupils equal, round.  Sclera 

anicteric. Conjunctiva are clear.  Mucous membranes of the mouth are moist.  

Neck is supple.  There is no elevated jugular venous pressure.  No carotid  

bruit is heard.





HEART EXAMINATION: Heart S1, S2 normal.  No murmur or gallop heard.





CHEST EXAMINATION: Lungs are clear with fine crackles to the bases bilaterally





ABDOMEN:  Soft, obese, nontender. Bowel sounds are heard. No organomegaly noted.


 


EXTREMITIES: 2+ peripheral pulses with 1-2+  evidence of peripheral edema and no

calf tenderness noted.





NEUROLOGIC patient is awake, alert and oriented 2.


 


.





- Labs


CBC & Chem 7: 


                                 06/29/20 06:35





                                 06/29/20 06:35


Labs: 


                  Abnormal Lab Results - Last 24 Hours (Table)











  06/29/20 06/29/20 06/29/20 Range/Units





  06:35 17:58 20:31 


 


POC Glucose (mg/dL)   168 H  197 H  (75-99)  mg/dL


 


Total Protein (PEP)  5.3 L    (6.2-8.2)  g/dL














  06/30/20 06/30/20 Range/Units





  06:07 11:50 


 


POC Glucose (mg/dL)  179 H  188 H  (75-99)  mg/dL


 


Total Protein (PEP)    (6.2-8.2)  g/dL














Assessment and Plan


Plan: 


Assessment and plan


#1 generalized weakness, frequent falls.  No clear-cut evidence of syncope


#2 abnormality in troponin, not consistent with acute coronary syndrome, with no

significant rise and fall pattern, is also noted on patient's prior admissions 

that he does have abnormality in troponin.  He denies any chest discomfort.


#3 hypertension


#4 prior CVA


#5 diabetes


#6 hyperlipidemia


#7 nicotine dependence


#8 Elevated BNP level, mild congestive heart failure, diastolic acute on 

chronic.


#9 anemia, chronic





Plan


Echocardiogram with Doppler study performed at revealed an ejection fraction of 

45-50%, similar to prior echo.  From cardiology's perspective, we'll continue 

the patient on his current medications.  No evidence of any orthostatic.  

Discharged once cleared by primary.





DNP note has been reviewed, I agree with a documented findings and plan of care.

 Patient was seen and examined.

## 2020-06-30 NOTE — P.PN
Subjective


Progress Note Date: 06/30/20








Luis Acosta, is a 75-year-old male who presented to [facility] with multiple vague symptoms, patient has generalized weakness, 

shortness of breath, and had multiple falls over the last 2 weeks, patient was 

evaluated in the emergency room, he was afebrile, blood pressure on presentation

112/64 pulse 64 pulse ox 95% on room air, he had bilateral crackles in the lungs

and 2+ bilateral lower extremity edema, laboratory data was significant for 

elevated troponin level of 0.118 and elevated BNP of 14,900, patient also had 

evidence of anemia with hemoglobin of 10.6.  Due to elevated troponin level 

patient was started on IV heparin and was admitted to telemetry floor, patient 

was also started on IV Lasix due to shortness of breath and elevated BNP level.


Patient has a known history of congestive heart failure, echocardiogram done in 

March revealed an ejection fraction of 45-50%, patient also has known history of

diabetes mellitus, hypertension, hyperlipidemia, and chronic anemia.








On 06/28/2020 patient was seen and examined on the medical floor he is alert and

oriented 3 in no apparent distress wife is raising concern about abdominal 

distention and abdominal discomfort.  There is no fever or chills no headache or

dizziness no chest pain no shortness of breath no cough no nausea or vomiting no

burning was urination no frequency or urgency and no hematuria.





On 06/29/2020 patient was seen and examined on the medical floor he is alert and

oriented 3 in no apparent distress computed tomography scan of the abdomen and 

pelvis revealed evidence of ascites he is scheduled for paracentesis today there

is no fever or chills no headache or dizziness no chest pain no shortness of 

breath no cough no nausea or vomiting no abdominal pain no diarrhea no burning 

with urination no frequency or urgency and no hematuria





On 06/30/2020 patient was seen and examined on the medical floor he is alert and

oriented in no apparent distress he denies any symptoms at this time there is no

fever or chills no headache or dizziness no chest pain no shortness of breath no

cough no nausea or vomiting no abdominal pain no diarrhea no burning with 

urination no frequency or urgency and no hematuria.  Patient had large amount of

ascites fluid removed yesterday, results on fluid analysis is still pending, 

will continue to monitor most likely patient has liver cirrhosis, possible dis

charge to home tomorrow with outpatient follow-up with gastroenterology and 

cardiology





Objective





- Vital Signs


Vital signs: 


                                   Vital Signs











Temp  97.5 F L  06/30/20 16:00


 


Pulse  97   06/30/20 16:00


 


Resp  16   06/30/20 16:00


 


BP  111/55   06/30/20 16:00


 


Pulse Ox  97   06/30/20 16:00








                                 Intake & Output











 06/30/20 06/30/20 07/01/20





 06:59 18:59 06:59


 


Intake Total 240 720 


 


Output Total 550 100 


 


Balance -310 620 


 


Weight 96.1 kg  


 


Intake:   


 


  Oral 240 720 


 


Output:   


 


  Urine 550 100 


 


Other:   


 


  Voiding Method Toilet Toilet 


 


  # Voids 1 1 


 


  # Bowel Movements  1 














- Exam











In general patient is alert and oriented 3 in no apparent distress, he has some

difficulty understanding English, his wife is in the room and answering most of 

the questions


HEENT head normocephalic and atraumatic


Neck is supple no JVD no goiter no lymphadenopathy


Chest exam reveals crackles in both lung bases no wheezing


Cardiac exam reveals regular heart sounds S1 and S2 no gallops no murmurs


Abdomen is soft nontender no organomegaly with normal bowel sounds


Extremity exam reveals no edema no cyanosis or clubbing


Neurological examination reveals no gross focal deficits








- Labs


CBC & Chem 7: 


                                 06/29/20 06:35





                                 06/29/20 06:35


Labs: 


                  Abnormal Lab Results - Last 24 Hours (Table)











  06/29/20 06/29/20 06/30/20 Range/Units





  06:35 20:31 06:07 


 


POC Glucose (mg/dL)   197 H  179 H  (75-99)  mg/dL


 


Total Protein (PEP)  5.3 L    (6.2-8.2)  g/dL


 


Free [place] LC, Quant  7.61 H    (0.33-1.94)  mg/dL


 


Free Lambda LC, Quant  7.60 H    (0.57-2.63)  mg/dL














  06/30/20 06/30/20 Range/Units





  11:50 16:51 


 


POC Glucose (mg/dL)  188 H  242 H  (75-99)  mg/dL


 


Total Protein (PEP)    (6.2-8.2)  g/dL


 


Free [place] LC, Quant    (0.33-1.94)  mg/dL


 


Free Lambda LC, Quant    (0.57-2.63)  mg/dL














Assessment and Plan


Plan: 





1.  Acute on chronic systolic and diastolic congestive heart failure with 

exacerbation, given IV Lasix 40 mg every 8 hours in the emergency room, at this 

time will discontinue IV Lasix, and increase oral Lasix dose from 20 mg twice 

daily to 40 mg twice daily, will monitor kidney function closely.





2.  Elevated troponin level, not consistent with acute myocardial infarction, IV

heparin was discontinued by cardiology, no intervention recommended at this 

time.





3.  Generalized weakness with multiple falls, Will consult physical therapy and 

occupational therapy, and assess need for rehabilitation after this admission.





4.  Anemia, will monitor hemoglobin closely, will check stool Hemoccult, and 

assess need for GI workup, will check iron level and folate and vitamin B12 

levels





5.  Underlying history of hyperlipidemia maintained on Lipitor continue





6.  Underlying history of insulin-dependent diabetes mellitus, continue with 

insulin and check hemoglobin A1c





7.  Underlying history of hypertension





8.  Underlying history of benign prostatic hypertrophy maintained on Flomax





9.  Underlying history of anxiety disorder maintained on Xanax





10.  Symptoms of abdominal distention and discomfort will check computed 

tomography scan of the abdomen and pelvis Will consult gastroenterology





At this time will switch to oral Lasix and monitor kidney function closely


IV heparin was discontinued by cardiology, will use subcu Lovenox for DVT 

prophylaxis


Protonix for GI prophylaxis


Will check stool Hemoccult check anemia workup


Will follow in a.m.

## 2020-07-01 VITALS — DIASTOLIC BLOOD PRESSURE: 61 MMHG | SYSTOLIC BLOOD PRESSURE: 128 MMHG

## 2020-07-01 VITALS — RESPIRATION RATE: 16 BRPM

## 2020-07-01 VITALS — TEMPERATURE: 97.9 F

## 2020-07-01 VITALS — HEART RATE: 69 BPM

## 2020-07-01 LAB
ALBUMIN SERPL ELPH-MCNC: 2.03 G/DL (ref 3.8–4.9)
GAMMA GLOB SERPL ELPH-MCNC: 1.3 G/DL (ref 0.7–1.5)
GLUCOSE BLD-MCNC: 134 MG/DL (ref 75–99)
GLUCOSE BLD-MCNC: 155 MG/DL (ref 75–99)
GLUCOSE BLD-MCNC: 215 MG/DL (ref 75–99)

## 2020-07-01 PROCEDURE — 0W9G3ZZ DRAINAGE OF PERITONEAL CAVITY, PERCUTANEOUS APPROACH: ICD-10-PCS

## 2020-07-01 RX ADMIN — INSULIN ASPART SCH UNIT: 100 INJECTION, SOLUTION INTRAVENOUS; SUBCUTANEOUS at 06:35

## 2020-07-01 RX ADMIN — INSULIN ASPART SCH: 100 INJECTION, SOLUTION INTRAVENOUS; SUBCUTANEOUS at 16:55

## 2020-07-01 RX ADMIN — INSULIN DETEMIR SCH UNIT: 100 INJECTION, SOLUTION SUBCUTANEOUS at 06:35

## 2020-07-01 RX ADMIN — CARVEDILOL SCH MG: 12.5 TABLET, FILM COATED ORAL at 16:33

## 2020-07-01 RX ADMIN — ASPIRIN 81 MG CHEWABLE TABLET SCH MG: 81 TABLET CHEWABLE at 08:45

## 2020-07-01 RX ADMIN — LISINOPRIL SCH MG: 20 TABLET ORAL at 08:45

## 2020-07-01 RX ADMIN — ISOSORBIDE MONONITRATE SCH MG: 30 TABLET, EXTENDED RELEASE ORAL at 08:45

## 2020-07-01 RX ADMIN — SODIUM CHLORIDE, PRESERVATIVE FREE SCH ML: 5 INJECTION INTRAVENOUS at 08:46

## 2020-07-01 RX ADMIN — FUROSEMIDE SCH MG: 40 TABLET ORAL at 16:33

## 2020-07-01 RX ADMIN — CYANOCOBALAMIN TAB 500 MCG SCH MCG: 500 TAB at 08:45

## 2020-07-01 RX ADMIN — SODIUM FERRIC GLUCONATE COMPLEX SCH MLS/HR: 12.5 INJECTION INTRAVENOUS at 10:08

## 2020-07-01 RX ADMIN — INSULIN ASPART SCH UNIT: 100 INJECTION, SOLUTION INTRAVENOUS; SUBCUTANEOUS at 12:58

## 2020-07-01 RX ADMIN — FUROSEMIDE SCH MG: 40 TABLET ORAL at 08:45

## 2020-07-01 RX ADMIN — CARVEDILOL SCH MG: 12.5 TABLET, FILM COATED ORAL at 06:35

## 2020-07-01 RX ADMIN — Medication SCH MG: at 08:45

## 2020-07-01 RX ADMIN — SPIRONOLACTONE SCH MG: 25 TABLET, FILM COATED ORAL at 08:46

## 2020-07-01 NOTE — P.DS
Providers


Date of admission: 


06/28/20 10:27





Expected date of discharge: 07/01/20


Attending physician: 


Marlin Tafoya





Consults: 





                                        





06/26/20 16:26


Consult Physician Urgent 


   Consulting Provider: Maximilian Hoffman


   Consult Reason/Comments: chf, cardiac strain


   Do you want consulting provider notified?: Yes





06/28/20 09:14


Consult Physician Routine 


   Consulting Provider: Franklin Marcus


   Consult Reason/Comments: abdminal pain


   Do you want consulting provider notified?: Yes





06/28/20 15:01


Consult Physician Routine 


   Consulting Provider: Ish Gaitan


   Consult Reason/Comments: abnormal CT scan of the abdomen


   Do you want consulting provider notified?: Yes





06/28/20 15:02


Consult Physician Routine 


   Consulting Provider: Ryan Guevara


   Consult Reason/Comments: ascites, splenomegaly


   Do you want consulting provider notified?: Yes











Primary care physician: 


Merry Grimaldo





Hospital Course: 








Diagnosis on discharge:














1.  Acute on chronic systolic and diastolic congestive heart failure with 

exacerbation, given IV Lasix 40 mg every 8 hours in the emergency room, at this 

time will discontinue IV Lasix, and increase oral Lasix dose from 20 mg twice 

daily to 40 mg twice daily, will monitor kidney function closely.  

Echocardiogram was done during this admission and was reviewed by cardiology, no

evidence of pericardial effusion, computed tomography scan of the abdomen showed

possible evidence of pericardial effusion cardiology reviewed echocardiogram 

again and assured patient that there is no pericardial effusion.





2.  Elevated troponin level, not consistent with acute myocardial infarction, IV

heparin was discontinued by cardiology, no intervention recommended at this 

time.





3.  Generalized weakness with multiple falls, Will consult physical therapy and 

occupational therapy, and assess need for rehabilitation after this admission.





4.  Anemia, will monitor hemoglobin closely, will check stool Hemoccult, and 

assess need for GI workup, will check iron level and folate and vitamin B12 

levels





5.  Underlying history of hyperlipidemia maintained on Lipitor continue





6.  Underlying history of insulin-dependent diabetes mellitus, continue with 

insulin and check hemoglobin A1c





7.  Underlying history of hypertension





8.  Underlying history of benign prostatic hypertrophy maintained on Flomax





9.  Underlying history of anxiety disorder maintained on Xanax





10.  Symptoms of abdominal distention and discomfort will check computed 

tomography scan of the abdomen and pelvis Will consult gastroenterology.  

Patient underwent paracentesis during this admission and fluid is compatible 

with liver disease possible liver cirrhosis related to old alcohol use from many

years ago. 




















Hospital course:








Luis Acosta, is a 75-year-old male who presented to [facility] with multiple vague symptoms, patient has generalized weakness, 

shortness of breath, and had multiple falls over the last 2 weeks, patient was 

evaluated in the emergency room, he was afebrile, blood pressure on presentation

112/64 pulse 64 pulse ox 95% on room air, he had bilateral crackles in the lungs

and 2+ bilateral lower extremity edema, laboratory data was significant for 

elevated troponin level of 0.118 and elevated BNP of 14,900, patient also had 

evidence of anemia with hemoglobin of 10.6.  Due to elevated troponin level 

patient was started on IV heparin and was admitted to telemetry floor, patient 

was also started on IV Lasix due to shortness of breath and elevated BNP level.


Patient has a known history of congestive heart failure, echocardiogram done in 

March revealed an ejection fraction of 45-50%, patient also has known history of

diabetes mellitus, hypertension, hyperlipidemia, and chronic anemia.








On 06/28/2020 patient was seen and examined on the medical floor he is alert and

oriented 3 in no apparent distress wife is raising concern about abdominal 

distention and abdominal discomfort.  There is no fever or chills no headache or

dizziness no chest pain no shortness of breath no cough no nausea or vomiting no

burning was urination no frequency or urgency and no hematuria.





On 06/29/2020 patient was seen and examined on the medical floor he is alert and

oriented 3 in no apparent distress computed tomography scan of the abdomen and 

pelvis revealed evidence of ascites he is scheduled for paracentesis today there

is no fever or chills no headache or dizziness no chest pain no shortness of 

breath no cough no nausea or vomiting no abdominal pain no diarrhea no burning 

with urination no frequency or urgency and no hematuria





On 06/30/2020 patient was seen and examined on the medical floor he is alert and

oriented in no apparent distress he denies any symptoms at this time there is no

fever or chills no headache or dizziness no chest pain no shortness of breath no

cough no nausea or vomiting no abdominal pain no diarrhea no burning with 

urination no frequency or urgency and no hematuria.  Patient had large amount of

ascites fluid removed yesterday, results on fluid analysis is still pending, 

will continue to monitor most likely patient has liver cirrhosis, possible 

discharge to home tomorrow with outpatient follow-up with gastroenterology and 

cardiology








On 07/01/2020 patient was seen and examined on the medical floor he is alert and

oriented 3 in no apparent distress there is no fever or chills no headache or 

dizziness no chest pain no shortness of breath no cough no nausea or vomiting no

abdominal pain no diarrhea no burning with urination no frequency or urgency and

no hematuria.  Patient was seen by specialist today and was cleared for 

discharge


He will need follow-up appointment with


-Primary care physician Dr. Grimaldo


-Gastroenterologist Dr. Davis


-[facility]


-Hematology Dr. Guevara


Overall prognosis is guarded due to advanced liver disease and congestive heart 

failure.


Patient Condition at Discharge: Serious





Plan - Discharge Summary


Discharge Rx Participant: No


New Discharge Prescriptions: 


New


   Spironolactone [Aldactone] 25 mg PO DAILY  tab


   Carvedilol [Coreg*] 12.5 mg PO BID-W/MEALS  tab


   Furosemide [Lasix] 40 mg PO BID@0900,1600  tab


   INSULIN ASPART (NovoLOG) [NovoLOG (formulary)] 4 unit SQ ACHS #2 vial





Continue


   Benazepril HCl 40 mg PO QAM


   Aspirin 81 mg PO DAILY


   Tamsulosin [Flomax] 0.4 mg PO HS


   Isosorbide Mononitrate ER [Imdur] 30 mg PO QAM


   Ferrous Sulfate [Iron (65 MG Elemental)] 325 mg PO DAILY #30 tab


   ALPRAZolam [Xanax] 0.25 mg PO Q6HR PRN  tab


     PRN Reason: Mild Anxiety


   Nitroglycerin Sl Tab (0.3mg) 0.3 mg SUBLINGUAL Q5M PRN


     PRN Reason: Chest Pain


   Cyanocobalamin (Vitamin B-12) [Vitamin B-12] 1,000 mcg PO DAILY





Changed


   Insulin Glargine,Hum.rec.anlog [Basaglar Kwikpen U-100] 35 unit SQ QAM #0





Discontinued


   Carvedilol [Coreg] 6.25 mg PO BID


   Spironolactone [Aldactone] 12.5 mg PO QAM


   Furosemide [Lasix] 20 mg PO BID  tab


   Atorvastatin [Lipitor] 80 mg PO HS


Discharge Medication List





Benazepril HCl 40 mg PO QAM 12/03/17 [History]


Aspirin 81 mg PO DAILY 12/04/17 [History]


Tamsulosin [Flomax] 0.4 mg PO HS 12/04/17 [History]


Isosorbide Mononitrate ER [Imdur] 30 mg PO QAM 09/05/18 [History]


Ferrous Sulfate [Iron (65 MG Elemental)] 325 mg PO DAILY #30 tab 12/03/18 [Rx]


ALPRAZolam [Xanax] 0.25 mg PO Q6HR PRN  tab 03/07/20 [Rx]


Cyanocobalamin (Vitamin B-12) [Vitamin B-12] 1,000 mcg PO DAILY 06/26/20 

[History]


Nitroglycerin Sl Tab (0.3mg) 0.3 mg SUBLINGUAL Q5M PRN 06/26/20 [History]


Carvedilol [Coreg*] 12.5 mg PO BID-W/MEALS  tab 07/01/20 [Rx]


Furosemide [Lasix] 40 mg PO BID@0900,1600  tab 07/01/20 [Rx]


INSULIN ASPART (NovoLOG) [NovoLOG (formulary)] 4 unit SQ ACHS #2 vial 07/01/20 

[Rx]


Insulin Glargine,Hum.rec.anlog [Basaglar Kwikpen U-100] 35 unit SQ QAM #0 

07/01/20 [Rx]


Spironolactone [Aldactone] 25 mg PO DAILY  tab 07/01/20 [Rx]








Follow up Appointment(s)/Referral(s): 


Ryan Guevara MD [STAFF PHYSICIAN] - 4 Weeks


Whitley Perez PAC [REFERRING] - 2 Weeks (2-4 weeks)


Merry Grimaldo MD [Primary Care Provider] - 1-2 days

## 2020-07-01 NOTE — US
Ultrasound-guided paracentesis.

 

DATE OF EXAM: 6/29/2020

 

CLINICAL HISTORY:  Ascites

 

The procedure was discussed with the patient. The risks, complications, benefits, and alternatives we
re discussed and any questions were answered. Informed consent was obtained. The patient was placed s
upine on the ultrasound table and prepped and draped in the usual sterile fashion. 

All elements of maximal barrier technique were utilized.  Under ultrasound guidance, access into the 
left lower quadrant was obtained, via the paracentesis catheter system and direct ultrasound guidance
.

 

Approximately 4 liters of straw-colored fluid was removed. The patient was stable throughout the proc
edure and remained stable upon discharge from Department of Radiology.

 

IMPRESSION: 

Successful  paracentesis under ultrasound guidance.

## 2020-07-01 NOTE — P.PN
Progress Note - Text


Progress Note Date: 07/01/20





Patient maintained stable.  His tolerating diet and stooling.





On exam vital signs appear stable.  Abdomen soft.





The patient will be observed.  No surgical intervention is planned.

## 2020-07-01 NOTE — P.PN
Subjective


Progress Note Date: 07/01/20


Principal diagnosis: 





CHF, iron deficient anemia





In follow-up today, pt had a shower, he denies any c/o, he is wanting to go 

home.  Patient denies any pain or bleeding





Objective





- Vital Signs


Vital signs: 


                                   Vital Signs











Temp  97.9 F   07/01/20 08:00


 


Pulse  69   07/01/20 12:00


 


Resp  16   07/01/20 12:00


 


BP  110/57   07/01/20 11:57


 


Pulse Ox  98   07/01/20 11:57








                                 Intake & Output











 06/30/20 07/01/20 07/01/20





 18:59 06:59 18:59


 


Intake Total 720 240 120


 


Output Total 100 1125 250


 


Balance 620 -885 -130


 


Weight  99.1 kg 


 


Intake:   


 


  Oral 720 240 120


 


Output:   


 


  Urine 100 1125 250


 


Other:   


 


  Voiding Method Toilet Toilet Toilet


 


  # Voids 1  1


 


  # Bowel Movements 1 2 














- Constitutional


General appearance: Present: cooperative, morbidly obese, no acute distress





- EENT


Eyes: Present: anicteric sclerae, EOMI


ENT: Present: hearing grossly normal





- Respiratory


Details: 





resp even and unlabored





- Cardiovascular


Details: 





skin warm and dry





- Gastrointestinal


General gastrointestinal: Present: distended, soft





- Neurologic


Neurologic: Present: CNII-XII intact





- Musculoskeletal


Musculoskeletal: Present: strength equal bilaterally





- Psychiatric


Psychiatric: Present: A&O x's 3, appropriate affect, intact judgment & insight





- Labs


CBC & Chem 7: 


                                 06/29/20 06:35





                                 06/30/20 18:42


Labs: 


                  Abnormal Lab Results - Last 24 Hours (Table)











  06/29/20 06/29/20 06/30/20 Range/Units





  06:35 06:35 16:51 


 


Sodium     (137-145)  mmol/L


 


Chloride     ()  mmol/L


 


Carbon Dioxide     (22-30)  mmol/L


 


POC Glucose (mg/dL)    242 H  (75-99)  mg/dL


 


Methylmalonic Acid   0.64 H   (<0.40)  umol/L


 


Free [place] LC, Quant  7.61 H    (0.33-1.94)  mg/dL


 


Free Lambda LC, Quant  7.60 H    (0.57-2.63)  mg/dL














  06/30/20 06/30/20 07/01/20 Range/Units





  18:42 20:07 06:10 


 


Sodium  133 L    (137-145)  mmol/L


 


Chloride  94 L    ()  mmol/L


 


Carbon Dioxide  34 H    (22-30)  mmol/L


 


POC Glucose (mg/dL)   185 H  215 H  (75-99)  mg/dL


 


Methylmalonic Acid     (<0.40)  umol/L


 


Free [place] LC, Quant     (0.33-1.94)  mg/dL


 


Free Lambda LC, Quant     (0.57-2.63)  mg/dL














  07/01/20 Range/Units





  11:50 


 


Sodium   (137-145)  mmol/L


 


Chloride   ()  mmol/L


 


Carbon Dioxide   (22-30)  mmol/L


 


POC Glucose (mg/dL)  155 H  (75-99)  mg/dL


 


Methylmalonic Acid   (<0.40)  umol/L


 


Free [place] LC, Quant   (0.33-1.94)  mg/dL


 


Free Lambda LC, Quant   (0.57-2.63)  mg/dL














Assessment and Plan


(1) Bicytopenia


Current Visit: Yes   Status: Acute   Priority: High   Code(s): D75.89 - OTHER 

SPECIFIED DISEASES OF BLOOD AND BLOOD-FORMING ORGANS   SNOMED Code(s): 83042721


   





(2) Splenomegaly


Current Visit: Yes   Status: Acute   Priority: High   Code(s): R16.1 - 

SPLENOMEGALY, NOT ELSEWHERE CLASSIFIED   SNOMED Code(s): 72080978


   


Plan: 


Bicytopenia workup shows no paraproteinemia, inflammatory markers within normal 

limits, no autoimmune markers positive


Paracentesis is transudative when serum/ascitic albumin is calculated and 

interpreted by [facility], most consistent with chronic liver disease and clinical 

picture-splenomegaly and bicytopenia. Pending cytology and flow cytometry as of 

7/1, will cont to follow until resulted.


Anemia workup showing iron deficiency.  Parenteral iron started.  Can be 

completed outpatient.  GI consulted.  





Recommend follow up with Dr. Guevara 4-6 weeks to reevaluate iron stores and 

bicytopenia.





Plan from Hem same

## 2020-07-01 NOTE — P.PN
Subjective


Progress Note Date: 06/30/20


Principal diagnosis: 





Ascites, esophageal varices, abdominal pain





The patient seen lying in bed in no acute complaints.  Tolerating diet.  No 

bowel movement today.





Objective





- Vital Signs


Vital signs: 


                                   Vital Signs











Temp  97.3 F L  06/30/20 12:00


 


Pulse  56 L  06/30/20 12:00


 


Resp  16   06/30/20 12:00


 


BP  103/51   06/30/20 12:00


 


Pulse Ox  94 L  06/30/20 12:00








                                 Intake & Output











 06/29/20 06/30/20 06/30/20





 18:59 06:59 18:59


 


Intake Total 720 240 240


 


Output Total  550 100


 


Balance 720 -310 140


 


Weight  96.1 kg 


 


Intake:   


 


  Oral 720 240 240


 


Output:   


 


  Urine  550 100


 


Other:   


 


  Voiding Method Toilet Toilet Toilet


 


  # Voids 2 1 1


 


  # Bowel Movements 2  1














- Exam





On physical examination, patient appears comfortable in no apparent distress. 


HEAD: Normocephalic, atraumatic. 


EYES: No scleral icterus. No conjunctival injection. 


MOUTH: No lesions, tongue midline. 


NECK: Trachea midline, no gross abnormalities. 


ABDOMEN: Soft, obese, nontender to palpation. Bowel sounds are positive. No 

organomegaly.  No guarding or rigidity.


EXTREMITIES: No pedal edema. 


SKIN: No rashes, no jaundice. 


NEUROLOGIC: Alert and oriented x3.  No focal deficits. 





- Labs


CBC & Chem 7: 


                                 06/29/20 06:35





                                 06/30/20 18:42


Labs: 


                  Abnormal Lab Results - Last 24 Hours (Table)











  06/29/20 06/29/20 06/29/20 Range/Units





  06:35 17:58 20:31 


 


POC Glucose (mg/dL)   168 H  197 H  (75-99)  mg/dL


 


Total Protein (PEP)  5.3 L    (6.2-8.2)  g/dL


 


Free [place] LC, Quant  7.61 H    (0.33-1.94)  mg/dL


 


Free Lambda LC, Quant  7.60 H    (0.57-2.63)  mg/dL














  06/30/20 06/30/20 Range/Units





  06:07 11:50 


 


POC Glucose (mg/dL)  179 H  188 H  (75-99)  mg/dL


 


Total Protein (PEP)    (6.2-8.2)  g/dL


 


Free [place] LC, Quant    (0.33-1.94)  mg/dL


 


Free Lambda LC, Quant    (0.57-2.63)  mg/dL














Assessment and Plan


(1) Ascites


Narrative/Plan: 


75-year-old male with multiple medical comorbidities presenting to the hospital 

with worsening shortness of breath and currently being treated for an 

exacerbation of underlying diastolic and systolic heart failure.  The patient 

has previously had endoscopic evaluation at the beginning of 2020 with findings 

of small gastric and esophageal varices.  Currently ultrasound of the abdomen 

has shown a ascites with paracentesis ordered.  Suspicion is for underlying 

liver disease, however cannot rule out heart failure as the cause of underlying 

ascites.  Currently patient is receiving diuretic therapy.


Current Visit: Yes   Status: Acute   Code(s): R18.8 - OTHER ASCITES   SNOMED 

Code(s): 337391251


   





(2) Esophageal varices determined by endoscopy


Current Visit: Yes   Status: Acute   Code(s): I85.00 - ESOPHAGEAL VARICES 

WITHOUT BLEEDING   SNOMED Code(s): 43584178


   





(3) Abdominal pain


Narrative/Plan: 


Likely multifactorial, cannot rule out a component of pain from underlying 

distention with ascites.


Current Visit: Yes   Status: Acute   Code(s): R10.9 - UNSPECIFIED ABDOMINAL PAIN

  SNOMED Code(s): 05266208


   


Plan: 





Supportive care


Continue diuresis with Aldactone 12.5 mg daily and Lasix 40 mg twice a day, can 

titrate medications based on electrolytes and kidney function


Continue management by internal medicine and cardiology services


Paracentesis ordered, albumin consistent with cardiac or underlying liver source

of the ascites, await protein and cell count


Okay for sodium restricted diet as tolerated


Hematology service consulted


No complaints of abdominal pain at this time, we'll continue to monitor


Thank you for allowing us to participate in the care of this patient

## 2020-07-01 NOTE — P.PN
Subjective


Progress Note Date: 07/01/20


Principal diagnosis: 





Ascites, esophageal varices, abdominal pain





The patient seen sitting bedside.  He is tolerating his diet.  No abdominal pain

reported.





Objective





- Vital Signs


Vital signs: 


                                   Vital Signs











Temp  97.9 F   07/01/20 08:00


 


Pulse  69   07/01/20 12:00


 


Resp  16   07/01/20 12:00


 


BP  110/57   07/01/20 11:57


 


Pulse Ox  98   07/01/20 11:57








                                 Intake & Output











 06/30/20 07/01/20 07/01/20





 18:59 06:59 18:59


 


Intake Total 720 240 360


 


Output Total 100 1125 550


 


Balance 620 -695 -190


 


Weight  99.1 kg 


 


Intake:   


 


  Oral 720 240 360


 


Output:   


 


  Urine 100 1125 550


 


Other:   


 


  Voiding Method Toilet Toilet Toilet


 


  # Voids 1  1


 


  # Bowel Movements 1 2 1














- Exam





On physical examination, patient appears comfortable in no apparent distress. 


HEAD: Normocephalic, atraumatic. 


EYES: No scleral icterus. No conjunctival injection. 


MOUTH: No lesions, tongue midline. 


NECK: Trachea midline, no gross abnormalities. 


ABDOMEN: Soft, obese, nontender to palpation. Bowel sounds are positive. No 

organomegaly.  No guarding or rigidity.


EXTREMITIES: No pedal edema. 


SKIN: No rashes, no jaundice. 


NEUROLOGIC: Alert and oriented x3.  No focal deficits. 





- Labs


CBC & Chem 7: 


                                 06/29/20 06:35





                                 06/30/20 18:42


Labs: 


                  Abnormal Lab Results - Last 24 Hours (Table)











  06/29/20 06/30/20 06/30/20 Range/Units





  06:35 16:51 18:42 


 


Sodium    133 L  (137-145)  mmol/L


 


Chloride    94 L  ()  mmol/L


 


Carbon Dioxide    34 H  (22-30)  mmol/L


 


POC Glucose (mg/dL)   242 H   (75-99)  mg/dL


 


Methylmalonic Acid  0.64 H    (<0.40)  umol/L














  06/30/20 07/01/20 07/01/20 Range/Units





  20:07 06:10 11:50 


 


Sodium     (137-145)  mmol/L


 


Chloride     ()  mmol/L


 


Carbon Dioxide     (22-30)  mmol/L


 


POC Glucose (mg/dL)  185 H  215 H  155 H  (75-99)  mg/dL


 


Methylmalonic Acid     (<0.40)  umol/L














Assessment and Plan


(1) Ascites


Narrative/Plan: 


75-year-old male with multiple medical comorbidities presenting to the hospital 

with worsening shortness of breath and currently being treated for an 

exacerbation of underlying diastolic and systolic heart failure.  The patient 

has previously had endoscopic evaluation at the beginning of 2020 with findings 

of small gastric and esophageal varices.  Currently ultrasound of the abdomen 

has shown a ascites with paracentesis ordered.  Suspicion is for underlying 

liver disease, however cannot rule out heart failure as the cause of underlying 

ascites.  Currently patient is receiving diuretic therapy.


Status: Acute   Code(s): R18.8 - OTHER ASCITES   SNOMED Code(s): 093087417


   





(2) Esophageal varices determined by endoscopy


Status: Acute   Code(s): I85.00 - ESOPHAGEAL VARICES WITHOUT BLEEDING   SNOMED 

Code(s): 57696303


   





(3) Abdominal pain


Narrative/Plan: 


Likely multifactorial, cannot rule out a component of pain from underlying 

distention with ascites.


Status: Acute   Code(s): R10.9 - UNSPECIFIED ABDOMINAL PAIN   SNOMED Code(s): 

30532275


   


Plan: 





Supportive care


Continue diuresis with Aldactone and Lasix therapy


Sodium restricted diet discussed at length with patient's son


Continue management by internal medicine and cardiology services


Paracentesis ordered, albumin consistent with cardiac or underlying liver source

of the ascites, await protein and cell count


Okay for sodium restricted diet as tolerated


Hematology service consulted


Thank you for allowing us to participate in the care of this patient

## 2020-07-27 ENCOUNTER — HOSPITAL ENCOUNTER (INPATIENT)
Dept: HOSPITAL 47 - EC | Age: 75
LOS: 4 days | Discharge: HOME | DRG: 372 | End: 2020-07-31
Attending: INTERNAL MEDICINE | Admitting: INTERNAL MEDICINE
Payer: MEDICARE

## 2020-07-27 DIAGNOSIS — F32.9: ICD-10-CM

## 2020-07-27 DIAGNOSIS — Z83.3: ICD-10-CM

## 2020-07-27 DIAGNOSIS — R18.8: ICD-10-CM

## 2020-07-27 DIAGNOSIS — I50.42: ICD-10-CM

## 2020-07-27 DIAGNOSIS — Z80.3: ICD-10-CM

## 2020-07-27 DIAGNOSIS — Z79.82: ICD-10-CM

## 2020-07-27 DIAGNOSIS — Z98.890: ICD-10-CM

## 2020-07-27 DIAGNOSIS — Z20.828: ICD-10-CM

## 2020-07-27 DIAGNOSIS — K63.5: ICD-10-CM

## 2020-07-27 DIAGNOSIS — Z79.4: ICD-10-CM

## 2020-07-27 DIAGNOSIS — D64.9: ICD-10-CM

## 2020-07-27 DIAGNOSIS — I85.10: ICD-10-CM

## 2020-07-27 DIAGNOSIS — F41.9: ICD-10-CM

## 2020-07-27 DIAGNOSIS — N40.0: ICD-10-CM

## 2020-07-27 DIAGNOSIS — F17.200: ICD-10-CM

## 2020-07-27 DIAGNOSIS — K65.2: Primary | ICD-10-CM

## 2020-07-27 DIAGNOSIS — K74.60: ICD-10-CM

## 2020-07-27 DIAGNOSIS — E78.5: ICD-10-CM

## 2020-07-27 DIAGNOSIS — M19.90: ICD-10-CM

## 2020-07-27 DIAGNOSIS — Z79.899: ICD-10-CM

## 2020-07-27 DIAGNOSIS — E11.9: ICD-10-CM

## 2020-07-27 DIAGNOSIS — E87.6: ICD-10-CM

## 2020-07-27 DIAGNOSIS — Z86.73: ICD-10-CM

## 2020-07-27 DIAGNOSIS — I11.0: ICD-10-CM

## 2020-07-27 LAB
ALBUMIN SERPL-MCNC: 2.4 G/DL (ref 3.5–5)
ALP SERPL-CCNC: 168 U/L (ref 38–126)
ALT SERPL-CCNC: 9 U/L (ref 4–49)
ANION GAP SERPL CALC-SCNC: 8 MMOL/L
APTT BLD: 23.1 SEC (ref 22–30)
AST SERPL-CCNC: 14 U/L (ref 17–59)
BASOPHILS # BLD AUTO: 0 K/UL (ref 0–0.2)
BASOPHILS NFR BLD AUTO: 0 %
BUN SERPL-SCNC: 27 MG/DL (ref 9–20)
CALCIUM SPEC-MCNC: 7.7 MG/DL (ref 8.4–10.2)
CHLORIDE SERPL-SCNC: 101 MMOL/L (ref 98–107)
CO2 SERPL-SCNC: 26 MMOL/L (ref 22–30)
EOSINOPHIL # BLD AUTO: 0.1 K/UL (ref 0–0.7)
EOSINOPHIL NFR BLD AUTO: 1 %
ERYTHROCYTE [DISTWIDTH] IN BLOOD BY AUTOMATED COUNT: 3.96 M/UL (ref 4.3–5.9)
ERYTHROCYTE [DISTWIDTH] IN BLOOD: 15.2 % (ref 11.5–15.5)
GLUCOSE BLD-MCNC: 103 MG/DL (ref 75–99)
GLUCOSE SERPL-MCNC: 93 MG/DL (ref 74–99)
HCT VFR BLD AUTO: 32.4 % (ref 39–53)
HGB BLD-MCNC: 10.7 GM/DL (ref 13–17.5)
HYALINE CASTS UR QL AUTO: 18 /LPF (ref 0–2)
INR PPP: 1.1 (ref ?–1.2)
LDH SPEC-CCNC: 216 U/L (ref 313–618)
LYMPHOCYTES # SPEC AUTO: 0.2 K/UL (ref 1–4.8)
LYMPHOCYTES NFR SPEC AUTO: 2 %
MAGNESIUM SPEC-SCNC: 1.7 MG/DL (ref 1.6–2.3)
MCH RBC QN AUTO: 27.1 PG (ref 25–35)
MCHC RBC AUTO-ENTMCNC: 33.2 G/DL (ref 31–37)
MCV RBC AUTO: 81.8 FL (ref 80–100)
MONOCYTES # BLD AUTO: 0.2 K/UL (ref 0–1)
MONOCYTES NFR BLD AUTO: 2 %
NEUTROPHILS # BLD AUTO: 8.1 K/UL (ref 1.3–7.7)
NEUTROPHILS NFR BLD AUTO: 95 %
PH UR: 5.5 [PH] (ref 5–8)
PLATELET # BLD AUTO: 188 K/UL (ref 150–450)
POTASSIUM SERPL-SCNC: 2.9 MMOL/L (ref 3.5–5.1)
PROT SERPL-MCNC: 5.6 G/DL (ref 6.3–8.2)
PROT UR QL: (no result)
PT BLD: 11.2 SEC (ref 9–12)
RBC UR QL: <1 /HPF (ref 0–5)
SODIUM SERPL-SCNC: 135 MMOL/L (ref 137–145)
SP GR UR: 1.01 (ref 1–1.03)
SQUAMOUS UR QL AUTO: 1 /HPF (ref 0–4)
UROBILINOGEN UR QL STRIP: 2 MG/DL (ref ?–2)
WBC # BLD AUTO: 8.6 K/UL (ref 3.8–10.6)
WBC # UR AUTO: 2 /HPF (ref 0–5)

## 2020-07-27 PROCEDURE — 87070 CULTURE OTHR SPECIMN AEROBIC: CPT

## 2020-07-27 PROCEDURE — 49083 ABD PARACENTESIS W/IMAGING: CPT

## 2020-07-27 PROCEDURE — 82728 ASSAY OF FERRITIN: CPT

## 2020-07-27 PROCEDURE — 85610 PROTHROMBIN TIME: CPT

## 2020-07-27 PROCEDURE — 85730 THROMBOPLASTIN TIME PARTIAL: CPT

## 2020-07-27 PROCEDURE — 87075 CULTR BACTERIA EXCEPT BLOOD: CPT

## 2020-07-27 PROCEDURE — 80053 COMPREHEN METABOLIC PANEL: CPT

## 2020-07-27 PROCEDURE — 99291 CRITICAL CARE FIRST HOUR: CPT

## 2020-07-27 PROCEDURE — 87340 HEPATITIS B SURFACE AG IA: CPT

## 2020-07-27 PROCEDURE — 71045 X-RAY EXAM CHEST 1 VIEW: CPT

## 2020-07-27 PROCEDURE — 83735 ASSAY OF MAGNESIUM: CPT

## 2020-07-27 PROCEDURE — 82042 OTHER SOURCE ALBUMIN QUAN EA: CPT

## 2020-07-27 PROCEDURE — 81001 URINALYSIS AUTO W/SCOPE: CPT

## 2020-07-27 PROCEDURE — 89050 BODY FLUID CELL COUNT: CPT

## 2020-07-27 PROCEDURE — 76700 US EXAM ABDOM COMPLETE: CPT

## 2020-07-27 PROCEDURE — 96368 THER/DIAG CONCURRENT INF: CPT

## 2020-07-27 PROCEDURE — 83605 ASSAY OF LACTIC ACID: CPT

## 2020-07-27 PROCEDURE — 84157 ASSAY OF PROTEIN OTHER: CPT

## 2020-07-27 PROCEDURE — 86706 HEP B SURFACE ANTIBODY: CPT

## 2020-07-27 PROCEDURE — 86140 C-REACTIVE PROTEIN: CPT

## 2020-07-27 PROCEDURE — 87040 BLOOD CULTURE FOR BACTERIA: CPT

## 2020-07-27 PROCEDURE — 86803 HEPATITIS C AB TEST: CPT

## 2020-07-27 PROCEDURE — 87205 SMEAR GRAM STAIN: CPT

## 2020-07-27 PROCEDURE — 96365 THER/PROPH/DIAG IV INF INIT: CPT

## 2020-07-27 PROCEDURE — 36415 COLL VENOUS BLD VENIPUNCTURE: CPT

## 2020-07-27 PROCEDURE — 83615 LACTATE (LD) (LDH) ENZYME: CPT

## 2020-07-27 PROCEDURE — 82105 ALPHA-FETOPROTEIN SERUM: CPT

## 2020-07-27 PROCEDURE — 82140 ASSAY OF AMMONIA: CPT

## 2020-07-27 PROCEDURE — 84145 PROCALCITONIN (PCT): CPT

## 2020-07-27 PROCEDURE — 93005 ELECTROCARDIOGRAM TRACING: CPT

## 2020-07-27 PROCEDURE — 85025 COMPLETE CBC W/AUTO DIFF WBC: CPT

## 2020-07-27 PROCEDURE — 86704 HEP B CORE ANTIBODY TOTAL: CPT

## 2020-07-27 NOTE — XR
EXAMINATION TYPE: XR chest 1V portable

 

DATE OF EXAM: 7/27/2020

 

COMPARISON: 6/26/2020

 

HISTORY: Pneumonia. Fever.

 

TECHNIQUE: 2 views

 

FINDINGS: There is no heart failure nor confluent pneumonic infiltrate. Thoracic aorta is atheromatou
s. There are chest leads. Costophrenic angles are clear.

 

IMPRESSION: No active cardiopulmonary disease. Atheromatous aorta. No change compared to old exam.

## 2020-07-27 NOTE — ED
General Adult HPI





- General


Stated complaint: fever


Time Seen by Provider: 07/27/20 17:59


Source: patient, EMS, RN notes reviewed


Mode of arrival: EMS


Limitations: no limitations





- History of Present Illness


Initial comments: 





Patient is a pleasant 75-year-old male presenting to the emergency department 

with fever.  Onset was this morning.  Patient is a poor historian and provides 

little information.  Patient did have some general weakness.  Patient states he 

feels fine and has no specific complaints.  No abdominal pain.  No cough or 

dyspnea.  No urinary symptoms.





- Related Data


                                Home Medications











 Medication  Instructions  Recorded  Confirmed


 


Benazepril HCl 40 mg PO QAM 12/03/17 06/26/20


 


Aspirin 81 mg PO DAILY 12/04/17 06/26/20


 


Tamsulosin [Flomax] 0.4 mg PO HS 12/04/17 06/26/20


 


Isosorbide Mononitrate ER [Imdur] 30 mg PO QAM 09/05/18 06/26/20


 


Cyanocobalamin (Vitamin B-12) 1,000 mcg PO DAILY 06/26/20 06/26/20





[Vitamin B-12]   


 


Nitroglycerin Sl Tab (0.3mg) 0.3 mg SUBLINGUAL Q5M PRN 06/26/20 06/26/20








                                  Previous Rx's











 Medication  Instructions  Recorded


 


Ferrous Sulfate [Iron (65  mg PO DAILY #30 tab 12/03/18





Elemental)]  


 


ALPRAZolam [Xanax] 0.25 mg PO Q6HR PRN  tab 03/07/20


 


Furosemide [Lasix] 40 mg PO BID@0900,1600  tab 07/01/20


 


INSULIN ASPART (NovoLOG) [NovoLOG 4 unit SQ ACHS #2 vial 07/01/20





(formulary)]  


 


Insulin Glargine,Hum.rec.anlog 35 unit SQ QAM #0 07/01/20





[Basaglar Kwikpen U-100]  


 


Spironolactone [Aldactone] 25 mg PO DAILY  tab 07/01/20


 


carvediloL [Coreg*] 12.5 mg PO BID-W/MEALS  tab 07/01/20











                                    Allergies











Allergy/AdvReac Type Severity Reaction Status Date / Time


 


No Known Allergies Allergy   Verified 07/27/20 20:26














Review of Systems


ROS Statement: 


Those systems with pertinent positive or pertinent negative responses have been 

documented in the HPI.





ROS Other: All systems not noted in ROS Statement are negative.


Constitutional: Reports: fever


Eyes: Denies: eye pain


ENT: Denies: ear pain


Respiratory: Denies: cough, dyspnea


Cardiovascular: Denies: chest pain


Endocrine: Reports: fatigue


Gastrointestinal: Denies: abdominal pain


Genitourinary: Denies: dysuria


Musculoskeletal: Denies: back pain


Skin: Denies: rash


Neurological: Reports: weakness (Patient did have general weakness).  Denies: 

headache





Past Medical History


Past Medical History: Chest Pain / Angina, Heart Failure, CVA/TIA, Diabetes 

Mellitus, Hyperlipidemia, Hypertension, Osteoarthritis (OA)


Additional Past Medical History / Comment(s): incontinent of stools 

intermittently. low iron levels, possible GI bleed-dark tarry stools per son, 

Son stated "has had recent falls related to blood sugar going low 60s"-HX 2017 

and 2018 had episodes of CHF approx 7-10 days post receiving flu 

vaccine,TIA,Type2 IDDM


History of Any Multi-Drug Resistant Organisms: None Reported


Past Surgical History: Hernia Repair


Additional Past Surgical History / Comment(s): 9/11/18 robot assisted 

laprascopic umbilical hernia repair with mesh., 6/10/19 repair recurrent 

incarerated hernia


Past Anesthesia/Blood Transfusion Reactions: No Reported Reaction


Additional Past Anesthesia/Blood Transfusion Reaction / Comment(s): never had 

anesthesia


Past Psychological History: Depression


Additional Psychological History / Comment(s): Pt resides with his spouse.  He 

states he uses no assistive devices.  He no longer drives, his spouse does the 

driving and manages his medications.


Past Alcohol Use History: None Reported


Additional Past Alcohol Use History / Comment(s): started smoking age teen, 

stopped for 25 years and restarted smoking occasionally/lightly-a pack will last

 2 weeks


Past Drug Use History: None Reported





- Past Family History


  ** Mother


Family Medical History: Cancer


Additional Family Medical History / Comment(s): breast cancer





  ** Father


Family Medical History: Unable to Obtain





  ** Sister(s)


Family Medical History: Cancer





  ** Brother(s)


Family Medical History: Diabetes Mellitus





General Exam


Limitations: no limitations


General appearance: alert, in no apparent distress


Head exam: Present: normocephalic


Eye exam: Present: normal appearance, PERRL


ENT exam: Present: normal oropharynx


Neck exam: Present: normal inspection.  Absent: tenderness, meningismus


Respiratory exam: Present: normal lung sounds bilaterally


Cardiovascular Exam: Present: regular rate, normal rhythm


GI/Abdominal exam: Present: soft.  Absent: distended, tenderness


Extremities exam: Present: normal inspection


Neurological exam: Present: alert, CN II-XII intact.  Absent: motor sensory 

deficit


  ** Expanded


Neurological exam: Present: protecting the airway


Patient oriented to: Present: person, place.  Absent: time


Motor strength exam: RUE: 5, LUE: 5, RLE: 5, LLE: 5


Psychiatric exam: Present: normal affect, normal mood


Skin exam: Present: normal color





Course


                                   Vital Signs











  07/27/20 07/27/20 07/27/20





  18:07 19:00 19:37


 


Temperature 102.6 F H  99.5 F


 


Pulse Rate 93 87 


 


Respiratory 18 24 





Rate   


 


Blood Pressure 122/58 100/40 


 


O2 Sat by Pulse 95 97 





Oximetry   














- Reevaluation(s)


Reevaluation #1: 





07/27/20 20:16


Patient does have fever and there is concern for infection however etiology is 

unclear at this time.  There is concern for sepsis diagnosed at 2015.  Blood 

culture and lactic acid and IV antibiotics ordered.  Fluid bolus ordered.  Ideal

 body weight of 76 kg 4 fluid bolus of 2300 mL





EKG Findings





- EKG Comments:


EKG Findings:: Sinus rhythm at 90.  For screening AV block MN of 260.  . 

 .  QTC 61.  Left axis.  Right bundle branch block.  Inferior Q waves.  Le

ft anterior fascicular block.  Nonspecific ST-T.





Medical Decision Making





- Medical Decision Making





Patient reevaluated.  Son is present and helps provide history.  Patient states 

he is feeling fine.  Systolic blood pressure 95.  Patient and family updated on 

results and plan.





Case was discussed in detail with Dr. Tafoya, who will admit for Dr. mon.

















- Lab Data


Result diagrams: 


                                 07/27/20 18:23





                                 07/27/20 18:23


                                   Lab Results











  07/27/20 07/27/20 07/27/20 Range/Units





  18:23 18:23 18:23 


 


WBC  8.6    (3.8-10.6)  k/uL


 


RBC  3.96 L    (4.30-5.90)  m/uL


 


Hgb  10.7 L    (13.0-17.5)  gm/dL


 


Hct  32.4 L    (39.0-53.0)  %


 


MCV  81.8    (80.0-100.0)  fL


 


MCH  27.1    (25.0-35.0)  pg


 


MCHC  33.2    (31.0-37.0)  g/dL


 


RDW  15.2    (11.5-15.5)  %


 


Plt Count  188    (150-450)  k/uL


 


Neutrophils %  95    %


 


Lymphocytes %  2    %


 


Monocytes %  2    %


 


Eosinophils %  1    %


 


Basophils %  0    %


 


Neutrophils #  8.1 H    (1.3-7.7)  k/uL


 


Lymphocytes #  0.2 L    (1.0-4.8)  k/uL


 


Monocytes #  0.2    (0-1.0)  k/uL


 


Eosinophils #  0.1    (0-0.7)  k/uL


 


Basophils #  0.0    (0-0.2)  k/uL


 


PT   11.2   (9.0-12.0)  sec


 


INR   1.1   (<1.2)  


 


APTT   23.1   (22.0-30.0)  sec


 


Sodium    135 L  (137-145)  mmol/L


 


Potassium    2.9 L  (3.5-5.1)  mmol/L


 


Chloride    101  ()  mmol/L


 


Carbon Dioxide    26  (22-30)  mmol/L


 


Anion Gap    8  mmol/L


 


BUN    27 H  (9-20)  mg/dL


 


Creatinine    1.28 H  (0.66-1.25)  mg/dL


 


Est GFR (CKD-EPI)AfAm    63  (>60 ml/min/1.73 sqM)  


 


Est GFR (CKD-EPI)NonAf    54  (>60 ml/min/1.73 sqM)  


 


Glucose    93  (74-99)  mg/dL


 


Plasma Lactic Acid Jesse     (0.7-2.0)  mmol/L


 


Calcium    7.7 L  (8.4-10.2)  mg/dL


 


Magnesium    1.7  (1.6-2.3)  mg/dL


 


Total Bilirubin    0.8  (0.2-1.3)  mg/dL


 


AST    14 L  (17-59)  U/L


 


ALT    9  (4-49)  U/L


 


Alkaline Phosphatase    168 H  ()  U/L


 


Lactate Dehydrogenase    216 L  (313-618)  U/L


 


C-Reactive Protein    69.1 H  (<10.0)  mg/L


 


Total Protein    5.6 L  (6.3-8.2)  g/dL


 


Albumin    2.4 L  (3.5-5.0)  g/dL


 


Urine Color     


 


Urine Appearance     (Clear)  


 


Urine pH     (5.0-8.0)  


 


Ur Specific Gravity     (1.001-1.035)  


 


Urine Protein     (Negative)  


 


Urine Glucose (UA)     (Negative)  


 


Urine Ketones     (Negative)  


 


Urine Blood     (Negative)  


 


Urine Nitrite     (Negative)  


 


Urine Bilirubin     (Negative)  


 


Urine Urobilinogen     (<2.0)  mg/dL


 


Ur Leukocyte Esterase     (Negative)  


 


Urine RBC     (0-5)  /hpf


 


Urine WBC     (0-5)  /hpf


 


Ur Squamous Epith Cells     (0-4)  /hpf


 


Urine Bacteria     (None)  /hpf


 


Hyaline Casts     (0-2)  /lpf


 


Urine Mucus     (None)  /hpf














  07/27/20 07/27/20 Range/Units





  18:23 18:54 


 


WBC    (3.8-10.6)  k/uL


 


RBC    (4.30-5.90)  m/uL


 


Hgb    (13.0-17.5)  gm/dL


 


Hct    (39.0-53.0)  %


 


MCV    (80.0-100.0)  fL


 


MCH    (25.0-35.0)  pg


 


MCHC    (31.0-37.0)  g/dL


 


RDW    (11.5-15.5)  %


 


Plt Count    (150-450)  k/uL


 


Neutrophils %    %


 


Lymphocytes %    %


 


Monocytes %    %


 


Eosinophils %    %


 


Basophils %    %


 


Neutrophils #    (1.3-7.7)  k/uL


 


Lymphocytes #    (1.0-4.8)  k/uL


 


Monocytes #    (0-1.0)  k/uL


 


Eosinophils #    (0-0.7)  k/uL


 


Basophils #    (0-0.2)  k/uL


 


PT    (9.0-12.0)  sec


 


INR    (<1.2)  


 


APTT    (22.0-30.0)  sec


 


Sodium    (137-145)  mmol/L


 


Potassium    (3.5-5.1)  mmol/L


 


Chloride    ()  mmol/L


 


Carbon Dioxide    (22-30)  mmol/L


 


Anion Gap    mmol/L


 


BUN    (9-20)  mg/dL


 


Creatinine    (0.66-1.25)  mg/dL


 


Est GFR (CKD-EPI)AfAm    (>60 ml/min/1.73 sqM)  


 


Est GFR (CKD-EPI)NonAf    (>60 ml/min/1.73 sqM)  


 


Glucose    (74-99)  mg/dL


 


Plasma Lactic Acid Jesse  1.2   (0.7-2.0)  mmol/L


 


Calcium    (8.4-10.2)  mg/dL


 


Magnesium    (1.6-2.3)  mg/dL


 


Total Bilirubin    (0.2-1.3)  mg/dL


 


AST    (17-59)  U/L


 


ALT    (4-49)  U/L


 


Alkaline Phosphatase    ()  U/L


 


Lactate Dehydrogenase    (313-618)  U/L


 


C-Reactive Protein    (<10.0)  mg/L


 


Total Protein    (6.3-8.2)  g/dL


 


Albumin    (3.5-5.0)  g/dL


 


Urine Color   Yellow  


 


Urine Appearance   Clear  (Clear)  


 


Urine pH   5.5  (5.0-8.0)  


 


Ur Specific Gravity   1.014  (1.001-1.035)  


 


Urine Protein   1+ H  (Negative)  


 


Urine Glucose (UA)   Negative  (Negative)  


 


Urine Ketones   Negative  (Negative)  


 


Urine Blood   Negative  (Negative)  


 


Urine Nitrite   Negative  (Negative)  


 


Urine Bilirubin   Negative  (Negative)  


 


Urine Urobilinogen   2.0  (<2.0)  mg/dL


 


Ur Leukocyte Esterase   Negative  (Negative)  


 


Urine RBC   <1  (0-5)  /hpf


 


Urine WBC   2  (0-5)  /hpf


 


Ur Squamous Epith Cells   1  (0-4)  /hpf


 


Urine Bacteria   Rare H  (None)  /hpf


 


Hyaline Casts   18 H  (0-2)  /lpf


 


Urine Mucus   Rare H  (None)  /hpf














- Radiology Data


Radiology results: image reviewed (Chest x-ray shows no acute process)





Critical Care Time


Critical Care Time: Yes


Total Critical Care Time: 32





Disposition


Clinical Impression: 


 Fever, Sepsis, Hypokalemia





Disposition: ADMITTED AS IP TO THIS [facility]


Condition: Serious


Is patient prescribed a controlled substance at d/c from ED?: No


Referrals: 


Merry Grimaldo MD [Primary Care Provider] - 1-2 days


Decision Time: 20:18

## 2020-07-28 LAB
ALBUMIN SERPL-MCNC: 2 G/DL (ref 3.5–5)
ALP SERPL-CCNC: 114 U/L (ref 38–126)
ALT SERPL-CCNC: 9 U/L (ref 4–49)
ANION GAP SERPL CALC-SCNC: 7 MMOL/L
AST SERPL-CCNC: 20 U/L (ref 17–59)
BASOPHILS # BLD AUTO: 0 K/UL (ref 0–0.2)
BASOPHILS NFR BLD AUTO: 0 %
BUN SERPL-SCNC: 29 MG/DL (ref 9–20)
CALCIUM SPEC-MCNC: 7.4 MG/DL (ref 8.4–10.2)
CELL CNT PNL FLD: 100
CHLORIDE SERPL-SCNC: 107 MMOL/L (ref 98–107)
CO2 SERPL-SCNC: 23 MMOL/L (ref 22–30)
DEPRECATED POLYS # FLD: 42 %
EOSINOPHIL # BLD AUTO: 0.1 K/UL (ref 0–0.7)
EOSINOPHIL NFR BLD AUTO: 1 %
ERYTHROCYTE [DISTWIDTH] IN BLOOD BY AUTOMATED COUNT: 3.48 M/UL (ref 4.3–5.9)
ERYTHROCYTE [DISTWIDTH] IN BLOOD: 15.3 % (ref 11.5–15.5)
FERRITIN SERPL-MCNC: 128.2 NG/ML (ref 22–322)
GLUCOSE BLD-MCNC: 71 MG/DL (ref 75–99)
GLUCOSE BLD-MCNC: 77 MG/DL (ref 75–99)
GLUCOSE BLD-MCNC: 78 MG/DL (ref 75–99)
GLUCOSE BLD-MCNC: 97 MG/DL (ref 75–99)
GLUCOSE SERPL-MCNC: 90 MG/DL (ref 74–99)
HCT VFR BLD AUTO: 29.2 % (ref 39–53)
HGB BLD-MCNC: 9.1 GM/DL (ref 13–17.5)
LYMPHOCYTES # SPEC AUTO: 0.6 K/UL (ref 1–4.8)
LYMPHOCYTES NFR SPEC AUTO: 9 %
MCH RBC QN AUTO: 26 PG (ref 25–35)
MCHC RBC AUTO-ENTMCNC: 31.1 G/DL (ref 31–37)
MCV RBC AUTO: 83.8 FL (ref 80–100)
MONOCYTES # BLD AUTO: 0.5 K/UL (ref 0–1)
MONOCYTES NFR BLD AUTO: 8 %
MONONUC CELLS # FLD: 58 %
NEUTROPHILS # BLD AUTO: 5.4 K/UL (ref 1.3–7.7)
NEUTROPHILS NFR BLD AUTO: 80 %
NUC CELL # FLD: 195 /UL
PLATELET # BLD AUTO: 159 K/UL (ref 150–450)
POTASSIUM SERPL-SCNC: 3.5 MMOL/L (ref 3.5–5.1)
PROT SERPL-MCNC: 4.9 G/DL (ref 6.3–8.2)
SODIUM SERPL-SCNC: 137 MMOL/L (ref 137–145)
WBC # BLD AUTO: 6.8 K/UL (ref 3.8–10.6)

## 2020-07-28 PROCEDURE — 0W9G3ZZ DRAINAGE OF PERITONEAL CAVITY, PERCUTANEOUS APPROACH: ICD-10-PCS

## 2020-07-28 RX ADMIN — TAMSULOSIN HYDROCHLORIDE SCH MG: 0.4 CAPSULE ORAL at 21:15

## 2020-07-28 RX ADMIN — INSULIN DETEMIR SCH UNIT: 100 INJECTION, SOLUTION SUBCUTANEOUS at 09:50

## 2020-07-28 RX ADMIN — THERA TABS SCH EACH: TAB at 09:51

## 2020-07-28 RX ADMIN — FUROSEMIDE SCH MG: 40 TABLET ORAL at 21:15

## 2020-07-28 RX ADMIN — INSULIN ASPART SCH: 100 INJECTION, SOLUTION INTRAVENOUS; SUBCUTANEOUS at 09:21

## 2020-07-28 RX ADMIN — CYANOCOBALAMIN TAB 500 MCG SCH MCG: 500 TAB at 09:50

## 2020-07-28 RX ADMIN — INSULIN ASPART SCH: 100 INJECTION, SOLUTION INTRAVENOUS; SUBCUTANEOUS at 17:47

## 2020-07-28 RX ADMIN — Medication SCH MG: at 09:50

## 2020-07-28 RX ADMIN — ASPIRIN 81 MG CHEWABLE TABLET SCH MG: 81 TABLET CHEWABLE at 09:50

## 2020-07-28 RX ADMIN — SPIRONOLACTONE SCH MG: 25 TABLET, FILM COATED ORAL at 21:15

## 2020-07-28 RX ADMIN — ISOSORBIDE MONONITRATE SCH MG: 30 TABLET, EXTENDED RELEASE ORAL at 09:50

## 2020-07-28 RX ADMIN — SPIRONOLACTONE SCH MG: 25 TABLET, FILM COATED ORAL at 09:51

## 2020-07-28 RX ADMIN — FUROSEMIDE SCH MG: 40 TABLET ORAL at 09:50

## 2020-07-28 RX ADMIN — INSULIN ASPART SCH: 100 INJECTION, SOLUTION INTRAVENOUS; SUBCUTANEOUS at 12:26

## 2020-07-28 RX ADMIN — FUROSEMIDE SCH MG: 40 TABLET ORAL at 17:47

## 2020-07-28 NOTE — US
EXAMINATION TYPE: US paracentesis abd w/image

 

DATE OF EXAM: 7/28/2020

 

CLINICAL HISTORY:  Ascites

 

Preprocedure preliminary imaging demonstrated moderate volume ascites of the bilateral lower quadrant
s, and small volume ascites of the upper quadrant.

 

The procedure was discussed with the patient and his son. The risks, complications, benefits, and alt
ernatives were discussed and any questions were answered. Informed consent was obtained. The patient 
requested his son signed consent form. Verbal consent was also confirmed with the patient himself. Th
e patient was placed supine on the ultrasound table and prepped and draped in the usual sterile fashi
on. 

 

All elements of maximal barrier technique were utilized.  Under ultrasound guidance, access into the 
left lower quadrant was obtained with a 5 [place] one-step centesis catheter. 

 

Approximately 2.2 liters of clear serous fluid was removed. Catheter was removed and sterile bandage 
was applied. The patient was stable throughout the procedure and remained stable upon discharge from 
Department of Radiology.

 

IMPRESSION: 

Successful ultrasound-guided diagnostic and therapeutic paracentesis, with removal of 2.2 liters of c
lear serous fluid.

## 2020-07-28 NOTE — P.HPIM
History of Present Illness


H&P Date: 07/28/20





[facility] of

fever he was evaluated in the emergency room, his temperature on presentation 

was 102.6 pulse 93 respiration 18 blood pressure 122/58 pulse ox 95% on 3 L 

nasal cannula, patient had no other complaints, he had abdominal distention with

evidence of sinusitis, otherwise he denies any complaints there was no chills no

headache no dizziness no chest pain no shortness of breath no cough no nausea or

vomiting no abdominal pain no diarrhea no burning with urination no frequency or

urgency and no hematuria, he states his fevers started about 24 hours prior to 

presentation, chest x-ray was done in the emergency room and did not reveal any 

evidence of pneumonia, urine analysis was done and did not reveal any evidence 

of urinary tract infection, patient was admitted to medical floor, radiology 

consult was requested for paracentesis, gastroenterology consult and infectious 

disease consultation were requested.  Patient was admitted recently to [facility] at that time he was diagnosed with sinusitis and possible liver 

cirrhosis.





Past Medical History


Past Medical History: Chest Pain / Angina, Heart Failure, CVA/TIA, Diabetes 

Mellitus, Hyperlipidemia, Hypertension, Osteoarthritis (OA)


Additional Past Medical History / Comment(s): incontinent of stools 

intermittently. low iron levels, possible GI bleed-dark tarry stools per son, 

Son stated "has had recent falls related to blood sugar going low 60s"-HX 2017 

and 2018 had episodes of CHF approx 7-10 days post receiving flu 

vaccine,TIA,Type2 IDDM


History of Any Multi-Drug Resistant Organisms: None Reported


Past Surgical History: Hernia Repair


Additional Past Surgical History / Comment(s): 9/11/18 robot assisted lap

rascopic umbilical hernia repair with mesh., 6/10/19 repair recurrent 

incarerated hernia


Past Anesthesia/Blood Transfusion Reactions: No Reported Reaction


Additional Past Anesthesia/Blood Transfusion Reaction / Comment(s): never had 

anesthesia


Smoking Status: Current every day smoker





- Past Family History


  ** Mother


Family Medical History: Cancer


Additional Family Medical History / Comment(s): breast cancer





  ** Father


Family Medical History: Unable to Obtain





  ** Sister(s)


Family Medical History: Cancer





  ** Brother(s)


Family Medical History: Diabetes Mellitus





Medications and Allergies


                                Home Medications











 Medication  Instructions  Recorded  Confirmed  Type


 


Benazepril HCl 40 mg PO QAM 12/03/17 07/27/20 History


 


Aspirin 81 mg PO DAILY 12/04/17 07/27/20 History


 


Tamsulosin [Flomax] 0.4 mg PO HS 12/04/17 07/27/20 History


 


Isosorbide Mononitrate ER [Imdur] 30 mg PO QAM 09/05/18 07/27/20 History


 


Ferrous Sulfate [Iron (65  mg PO DAILY #30 tab 12/03/18 07/27/20 Rx





Elemental)]    


 


ALPRAZolam [Xanax] 0.25 mg PO Q6HR PRN  tab 03/07/20 07/27/20 Rx


 


Cyanocobalamin (Vitamin B-12) 1,000 mcg PO DAILY 06/26/20 07/27/20 History





[Vitamin B-12]    


 


Nitroglycerin Sl Tab (0.3mg) 0.3 mg SUBLINGUAL Q5M PRN 06/26/20 07/27/20 History


 


carvediloL [Coreg*] 12.5 mg PO BID-W/MEALS  tab 07/01/20 07/27/20 Rx


 


Furosemide [Lasix] 40 mg PO TID 07/27/20 07/27/20 History


 


INSULIN ASPART (NovoLOG) [NovoLOG 4 unit SQ AC-TID 07/27/20 07/27/20 History





(formulary)]    


 


Insulin Glargine,Hum.rec.anlog 40 unit SQ QAM 07/27/20 07/27/20 History





[Basaglar Kwikpen U-100]    


 


Multivitamins, Thera [Multivitamin 1 tab PO DAILY 07/27/20 07/27/20 History





(formulary)]    


 


Spironolactone [Aldactone] 25 mg PO BID 07/27/20 07/27/20 History








                                    Allergies











Allergy/AdvReac Type Severity Reaction Status Date / Time


 


No Known Allergies Allergy   Verified 07/27/20 20:26














Physical Exam


Vitals: 


                                   Vital Signs











  Temp Pulse Pulse Resp BP BP Pulse Ox


 


 07/28/20 04:00  97.6 F   58 L  18   103/61  97


 


 07/27/20 23:37    78  18   


 


 07/27/20 23:34  98 F   78  18   109/58  96


 


 07/27/20 21:30  97.5 F L   81  18   114/56  98


 


 07/27/20 21:02  99 F      


 


 07/27/20 20:30   78   20  95/44   95


 


 07/27/20 20:00   75   20  92/36   93 L


 


 07/27/20 19:37  99.5 F      


 


 07/27/20 19:30      101/40  


 


 07/27/20 19:00   87   24  100/40   97


 


 07/27/20 18:07  102.6 F H  93   18  122/58   95








                                Intake and Output











 07/27/20 07/28/20 07/28/20





 22:59 06:59 14:59


 


Output Total 50 100 


 


Balance -50 -100 


 


Output:   


 


  Urine 50 100 


 


Other:   


 


  # Voids  1 


 


  # Bowel Movements  2 


 


  Weight 101.605 kg 99.5 kg 














In general patient is alert and oriented 3 in no apparent distress


HEENT head normocephalic and atraumatic


Neck is supple no JVD no goiter no lymphadenopathy


Chest exam reveals a few scattered rhonchi no wheezing


Cardiac exam reveals regular heart sounds S1 and S2 no gallops no murmurs


Abdomen is soft nontender distended no organomegaly no palpable masses with 

normal bowel sounds


Extremity exam reveals no edema no cyanosis or clubbing





Results


CBC & Chem 7: 


                                 07/28/20 06:12





                                 07/28/20 06:12


Labs: 


                  Abnormal Lab Results - Last 24 Hours (Table)











  07/27/20 07/27/20 07/27/20 Range/Units





  18:23 18:23 18:23 


 


RBC  3.96 L    (4.30-5.90)  m/uL


 


Hgb  10.7 L    (13.0-17.5)  gm/dL


 


Hct  32.4 L    (39.0-53.0)  %


 


Neutrophils #  8.1 H    (1.3-7.7)  k/uL


 


Lymphocytes #  0.2 L    (1.0-4.8)  k/uL


 


Sodium   135 L   (137-145)  mmol/L


 


Potassium   2.9 L   (3.5-5.1)  mmol/L


 


BUN   27 H   (9-20)  mg/dL


 


Creatinine   1.28 H   (0.66-1.25)  mg/dL


 


POC Glucose (mg/dL)     (75-99)  mg/dL


 


Calcium   7.7 L   (8.4-10.2)  mg/dL


 


AST   14 L   (17-59)  U/L


 


Alkaline Phosphatase   168 H   ()  U/L


 


Lactate Dehydrogenase   216 L   (313-618)  U/L


 


C-Reactive Protein   69.1 H   (<10.0)  mg/L


 


Total Protein   5.6 L   (6.3-8.2)  g/dL


 


Albumin   2.4 L   (3.5-5.0)  g/dL


 


Procalcitonin    2.72 H  (0.02-0.09)  ng/mL


 


Urine Protein     (Negative)  


 


Urine Bacteria     (None)  /hpf


 


Hyaline Casts     (0-2)  /lpf


 


Urine Mucus     (None)  /hpf














  07/27/20 07/27/20 07/28/20 Range/Units





  18:54 21:59 06:12 


 


RBC    3.48 L  (4.30-5.90)  m/uL


 


Hgb    9.1 L D  (13.0-17.5)  gm/dL


 


Hct    29.2 L  (39.0-53.0)  %


 


Neutrophils #     (1.3-7.7)  k/uL


 


Lymphocytes #    0.6 L  (1.0-4.8)  k/uL


 


Sodium     (137-145)  mmol/L


 


Potassium     (3.5-5.1)  mmol/L


 


BUN     (9-20)  mg/dL


 


Creatinine     (0.66-1.25)  mg/dL


 


POC Glucose (mg/dL)   103 H   (75-99)  mg/dL


 


Calcium     (8.4-10.2)  mg/dL


 


AST     (17-59)  U/L


 


Alkaline Phosphatase     ()  U/L


 


Lactate Dehydrogenase     (313-618)  U/L


 


C-Reactive Protein     (<10.0)  mg/L


 


Total Protein     (6.3-8.2)  g/dL


 


Albumin     (3.5-5.0)  g/dL


 


Procalcitonin     (0.02-0.09)  ng/mL


 


Urine Protein  1+ H    (Negative)  


 


Urine Bacteria  Rare H    (None)  /hpf


 


Hyaline Casts  18 H    (0-2)  /lpf


 


Urine Mucus  Rare H    (None)  /hpf














  07/28/20 Range/Units





  06:12 


 


RBC   (4.30-5.90)  m/uL


 


Hgb   (13.0-17.5)  gm/dL


 


Hct   (39.0-53.0)  %


 


Neutrophils #   (1.3-7.7)  k/uL


 


Lymphocytes #   (1.0-4.8)  k/uL


 


Sodium   (137-145)  mmol/L


 


Potassium   (3.5-5.1)  mmol/L


 


BUN  29 H  (9-20)  mg/dL


 


Creatinine   (0.66-1.25)  mg/dL


 


POC Glucose (mg/dL)   (75-99)  mg/dL


 


Calcium  7.4 L  (8.4-10.2)  mg/dL


 


AST   (17-59)  U/L


 


Alkaline Phosphatase   ()  U/L


 


Lactate Dehydrogenase   (313-618)  U/L


 


C-Reactive Protein   (<10.0)  mg/L


 


Total Protein  4.9 L  (6.3-8.2)  g/dL


 


Albumin  2.0 L  (3.5-5.0)  g/dL


 


Procalcitonin   (0.02-0.09)  ng/mL


 


Urine Protein   (Negative)  


 


Urine Bacteria   (None)  /hpf


 


Hyaline Casts   (0-2)  /lpf


 


Urine Mucus   (None)  /hpf














Thrombosis Risk Factor Assmnt





- Choose All That Apply


Each Risk Factor Represents 3 Points: Age 75 years or older


Thrombosis Risk Factor Assessment Total Risk Factor Score: 3


Thrombosis Risk Factor Assessment Level: Moderate Risk





Assessment and Plan


Plan: 





1.  Febrile illness cause is unclear, no evidence of pneumonia no evidence of 

urinary tract infection, Covid 19 testing pending


2.  Ascites radiology consult requested for paracentesis 


3.  Underlying history of congestive heart failure systolic and diastolic, 

stable at this time.  with recent admission to the hospital 1 months ago for 

congestive heart failure exacerbation.


4.  Underlying history of hypertension 


5.  Underlying history of hyperlipidemia 


6.  Underlying history of BPH maintained on Flomax 


7.  Underlying history of anxiety disorder





 at this time medication and labs were reviewed consultation for 

gastroenterology and infectious disease requested will follow closely

## 2020-07-28 NOTE — US
EXAMINATION TYPE: US abdomen complete

 

DATE OF EXAM: 7/28/2020

 

COMPARISON: CT & US 2020

 

CLINICAL HISTORY: distended abd . Distended abdomen, exam done portable.

 

EXAM MEASUREMENTS:

 

Liver Length:  19.5 cm   

Gallbladder Wall:  0.4 cm   

CBD:  0.5 cm

Spleen:  16.7 cm   

Right Kidney:  12.8 x 6.4 x 5.7 cm 

Left Kidney:  12.6 x 4.7 x 6.0 cm   

 

 

Pancreas:  The visualized head is unremarkable. Remainder of pancreas obscured due to overlying bowel
 gas.

Liver:  Hepatomegaly. There is coarsened hepatic echotexture and peripheral nodularity.  

Gallbladder:  There is minimal wall thickening likely related to cirrhotic liver. No pericholecystic 
edema.

Sonographer reports a negative sonographic Hayden sign.

CBD:  Normal. No intrahepatic or extrahepatic biliary ductal dilatation. 

Spleen:  Splenomegaly. Calcified granulomas redemonstrated.   

Right Kidney:  Normal, though slightly obscured inferior pole.   

Left Kidney:  Normal.   

Upper IVC:  Normal.  

Abd Aorta:  No proximal abdominal aortic aneurysm. Mid and distal portions obscured due to overlying 
bowel gas.

 

Other: Ascites.

 

 

IMPRESSION: 

Cirrhotic liver with signs of portal hypertension including splenomegaly and ascites.

## 2020-07-29 VITALS — RESPIRATION RATE: 18 BRPM

## 2020-07-29 LAB
ALBUMIN SERPL-MCNC: 2.1 G/DL (ref 3.5–5)
ALP SERPL-CCNC: 130 U/L (ref 38–126)
ALT SERPL-CCNC: 10 U/L (ref 4–49)
ANION GAP SERPL CALC-SCNC: 8 MMOL/L
AST SERPL-CCNC: 20 U/L (ref 17–59)
BASOPHILS # BLD AUTO: 0 K/UL (ref 0–0.2)
BASOPHILS NFR BLD AUTO: 0 %
BUN SERPL-SCNC: 25 MG/DL (ref 9–20)
CALCIUM SPEC-MCNC: 7.2 MG/DL (ref 8.4–10.2)
CHLORIDE SERPL-SCNC: 103 MMOL/L (ref 98–107)
CO2 SERPL-SCNC: 24 MMOL/L (ref 22–30)
EOSINOPHIL # BLD AUTO: 0 K/UL (ref 0–0.7)
EOSINOPHIL NFR BLD AUTO: 1 %
ERYTHROCYTE [DISTWIDTH] IN BLOOD BY AUTOMATED COUNT: 3.48 M/UL (ref 4.3–5.9)
ERYTHROCYTE [DISTWIDTH] IN BLOOD: 15.4 % (ref 11.5–15.5)
GLUCOSE BLD-MCNC: 134 MG/DL (ref 75–99)
GLUCOSE BLD-MCNC: 152 MG/DL (ref 75–99)
GLUCOSE BLD-MCNC: 202 MG/DL (ref 75–99)
GLUCOSE BLD-MCNC: 55 MG/DL (ref 75–99)
GLUCOSE BLD-MCNC: 72 MG/DL (ref 75–99)
GLUCOSE BLD-MCNC: 92 MG/DL (ref 75–99)
GLUCOSE SERPL-MCNC: 82 MG/DL (ref 74–99)
HCT VFR BLD AUTO: 29 % (ref 39–53)
HGB BLD-MCNC: 9.1 GM/DL (ref 13–17.5)
LYMPHOCYTES # SPEC AUTO: 0.8 K/UL (ref 1–4.8)
LYMPHOCYTES NFR SPEC AUTO: 20 %
MCH RBC QN AUTO: 26.3 PG (ref 25–35)
MCHC RBC AUTO-ENTMCNC: 31.5 G/DL (ref 31–37)
MCV RBC AUTO: 83.4 FL (ref 80–100)
MONOCYTES # BLD AUTO: 0.4 K/UL (ref 0–1)
MONOCYTES NFR BLD AUTO: 9 %
NEUTROPHILS # BLD AUTO: 2.6 K/UL (ref 1.3–7.7)
NEUTROPHILS NFR BLD AUTO: 67 %
PLATELET # BLD AUTO: 138 K/UL (ref 150–450)
POTASSIUM SERPL-SCNC: 3.5 MMOL/L (ref 3.5–5.1)
PROT FLD-MCNC: 1980 MG/DL
PROT SERPL-MCNC: 5.1 G/DL (ref 6.3–8.2)
SODIUM SERPL-SCNC: 135 MMOL/L (ref 137–145)
WBC # BLD AUTO: 3.9 K/UL (ref 3.8–10.6)

## 2020-07-29 RX ADMIN — INSULIN DETEMIR SCH: 100 INJECTION, SOLUTION SUBCUTANEOUS at 06:27

## 2020-07-29 RX ADMIN — SPIRONOLACTONE SCH MG: 25 TABLET, FILM COATED ORAL at 19:58

## 2020-07-29 RX ADMIN — TAMSULOSIN HYDROCHLORIDE SCH MG: 0.4 CAPSULE ORAL at 19:58

## 2020-07-29 RX ADMIN — SPIRONOLACTONE SCH MG: 25 TABLET, FILM COATED ORAL at 10:47

## 2020-07-29 RX ADMIN — ASPIRIN 81 MG CHEWABLE TABLET SCH MG: 81 TABLET CHEWABLE at 10:45

## 2020-07-29 RX ADMIN — FUROSEMIDE SCH MG: 40 TABLET ORAL at 10:47

## 2020-07-29 RX ADMIN — FUROSEMIDE SCH MG: 40 TABLET ORAL at 19:58

## 2020-07-29 RX ADMIN — CYANOCOBALAMIN TAB 500 MCG SCH MCG: 500 TAB at 10:45

## 2020-07-29 RX ADMIN — ISOSORBIDE MONONITRATE SCH MG: 30 TABLET, EXTENDED RELEASE ORAL at 10:46

## 2020-07-29 RX ADMIN — INSULIN ASPART SCH: 100 INJECTION, SOLUTION INTRAVENOUS; SUBCUTANEOUS at 06:27

## 2020-07-29 RX ADMIN — FUROSEMIDE SCH MG: 40 TABLET ORAL at 16:41

## 2020-07-29 RX ADMIN — INSULIN ASPART SCH: 100 INJECTION, SOLUTION INTRAVENOUS; SUBCUTANEOUS at 12:25

## 2020-07-29 RX ADMIN — THERA TABS SCH EACH: TAB at 10:46

## 2020-07-29 RX ADMIN — INSULIN ASPART SCH UNIT: 100 INJECTION, SOLUTION INTRAVENOUS; SUBCUTANEOUS at 16:41

## 2020-07-29 RX ADMIN — Medication SCH MG: at 10:46

## 2020-07-29 NOTE — P.PN
Subjective


Progress Note Date: 07/29/20








[facility]

fever he was evaluated in the emergency room, his temperature on presentation 

was 102.6 pulse 93 respiration 18 blood pressure 122/58 pulse ox 95% on 3 L 

nasal cannula, patient had no other complaints, he had abdominal distention with

evidence of sinusitis, otherwise he denies any complaints there was no chills no

headache no dizziness no chest pain no shortness of breath no cough no nausea or

vomiting no abdominal pain no diarrhea no burning with urination no frequency or

urgency and no hematuria, he states his fevers started about 24 hours prior to 

presentation, chest x-ray was done in the emergency room and did not reveal any 

evidence of pneumonia, urine analysis was done and did not reveal any evidence 

of urinary tract infection, patient was admitted to medical floor, radiology 

consult was requested for paracentesis, gastroenterology consult and infectious 

disease consultation were requested.  Patient was admitted recently to [facility] at that time he was diagnosed with sinusitis and possible liver 

cirrhosis.








On 07/29/2020 patient was seen and examined on the medical floor he is alert and

oriented 3 in no apparent distress there is no new episodes of fever no chills 

no headache or dizziness no chest pain no shortness of breath no cough no nausea

or vomiting no abdominal pain no diarrhea no burning with urination no frequency

or urgency and no hematuria, which are from the ascites fluid is still pending, 

patient is maintained on empiric antibiotic with Rocephin





Objective





- Vital Signs


Vital signs: 


                                   Vital Signs











Temp  98.1 F   07/29/20 16:00


 


Pulse  59 L  07/29/20 16:00


 


Resp  18   07/29/20 16:00


 


BP  137/65   07/29/20 16:00


 


Pulse Ox  96   07/29/20 16:00








                                 Intake & Output











 07/28/20 07/29/20 07/29/20





 18:59 06:59 18:59


 


Intake Total 240 50 952


 


Output Total 250 950 


 


Balance -10 -900 952


 


Weight  102 kg 


 


Intake:   


 


  Intake, IV Titration  50 





  Amount   


 


    cefTRIAXone 1 gm In  50 





    Sodium Chloride 0.9% 50   





    ml @ 100 mls/hr IVPB   





    Q12HR [facility] Rx#:[id number]   


 


  Oral 240  952


 


Output:   


 


  Urine 250 950 


 


Other:   


 


  Voiding Method  Urinal Urinal


 


  # Voids 1 2 2


 


  # Bowel Movements 1  














- Exam








In general patient is alert and oriented 3 in no apparent distress


HEENT head normocephalic and atraumatic


Neck is supple no JVD no goiter no lymphadenopathy


Chest exam reveals a few scattered rhonchi no wheezing


Cardiac exam reveals regular heart sounds S1 and S2 no gallops no murmurs


Abdomen is soft nontender distended no organomegaly no palpable masses with 

normal bowel sounds


Extremity exam reveals no edema no cyanosis or clubbing








- Labs


CBC & Chem 7: 


                                 07/29/20 06:32





                                 07/29/20 06:32


Labs: 


                  Abnormal Lab Results - Last 24 Hours (Table)











  07/29/20 07/29/20 07/29/20 Range/Units





  01:57 02:12 06:32 


 


RBC     (4.30-5.90)  m/uL


 


Hgb     (13.0-17.5)  gm/dL


 


Hct     (39.0-53.0)  %


 


Plt Count     (150-450)  k/uL


 


Lymphocytes #     (1.0-4.8)  k/uL


 


Sodium    135 L  (137-145)  mmol/L


 


BUN    25 H  (9-20)  mg/dL


 


POC Glucose (mg/dL)  55 L  72 L   (75-99)  mg/dL


 


Calcium    7.2 L  (8.4-10.2)  mg/dL


 


Alkaline Phosphatase    130 H  ()  U/L


 


Total Protein    5.1 L  (6.3-8.2)  g/dL


 


Albumin    2.1 L  (3.5-5.0)  g/dL














  07/29/20 07/29/20 07/29/20 Range/Units





  06:32 12:16 16:34 


 


RBC  3.48 L    (4.30-5.90)  m/uL


 


Hgb  9.1 L    (13.0-17.5)  gm/dL


 


Hct  29.0 L    (39.0-53.0)  %


 


Plt Count  138 L    (150-450)  k/uL


 


Lymphocytes #  0.8 L    (1.0-4.8)  k/uL


 


Sodium     (137-145)  mmol/L


 


BUN     (9-20)  mg/dL


 


POC Glucose (mg/dL)   134 H  202 H  (75-99)  mg/dL


 


Calcium     (8.4-10.2)  mg/dL


 


Alkaline Phosphatase     ()  U/L


 


Total Protein     (6.3-8.2)  g/dL


 


Albumin     (3.5-5.0)  g/dL








                      Microbiology - Last 24 Hours (Table)











 07/28/20 14:59 Gram Stain - Preliminary





 Ascites Fluid Body Fluid Culture - Preliminary


 


 07/28/20 14:59 Anaerobic Culture - Preliminary





 Ascites Fluid 


 


 07/27/20 18:23 Blood Culture - Preliminary





 Blood    No Growth after 24 hours














Assessment and Plan


Plan: 





1.  Febrile illness cause is unclear, no evidence of pneumonia no evidence of 

urinary tract infection, Covid 19 testing pending


2.  Ascites radiology consult requested for paracentesis 


3.  Underlying history of congestive heart failure systolic and diastolic, 

stable at this time.  with recent admission to the hospital 1 months ago for 

congestive heart failure exacerbation.


4.  Underlying history of hypertension 


5.  Underlying history of hyperlipidemia 


6.  Underlying history of BPH maintained on Flomax 


7.  Underlying history of anxiety disorder





 at this time medication and labs were reviewed consultation for 

gastroenterology and infectious disease requested will follow closely

## 2020-07-29 NOTE — P.PN
Subjective


Progress Note Date: 07/28/20


Principal diagnosis: 





Cirrhosis





Attempt was made to see the patient, however the patient was unavailable and she

was undergoing paracentesis.  We'll see the patient tomorrow.





Objective





- Vital Signs


Vital signs: 


                                   Vital Signs











Temp  98.1 F   07/29/20 12:00


 


Pulse  58 L  07/29/20 12:00


 


Resp  16   07/29/20 12:00


 


BP  136/63   07/29/20 12:00


 


Pulse Ox  95   07/29/20 12:00








                                 Intake & Output











 07/28/20 07/29/20 07/29/20





 18:59 06:59 18:59


 


Intake Total 240 50 476


 


Output Total 250 950 


 


Balance -10 -900 476


 


Weight  102 kg 


 


Intake:   


 


  Intake, IV Titration  50 





  Amount   


 


    cefTRIAXone 1 gm In  50 





    Sodium Chloride 0.9% 50   





    ml @ 100 mls/hr IVPB   





    Q12HR [facility] Rx#:[id number]   


 


  Oral 240  476


 


Output:   


 


  Urine 250 950 


 


Other:   


 


  Voiding Method  Urinal Urinal


 


  # Voids 1 2 1


 


  # Bowel Movements 1  














- Labs


CBC & Chem 7: 


                                 07/29/20 06:32





                                 07/29/20 06:32


Labs: 


                  Abnormal Lab Results - Last 24 Hours (Table)











  07/29/20 07/29/20 07/29/20 Range/Units





  01:57 02:12 06:32 


 


RBC     (4.30-5.90)  m/uL


 


Hgb     (13.0-17.5)  gm/dL


 


Hct     (39.0-53.0)  %


 


Plt Count     (150-450)  k/uL


 


Lymphocytes #     (1.0-4.8)  k/uL


 


Sodium    135 L  (137-145)  mmol/L


 


BUN    25 H  (9-20)  mg/dL


 


POC Glucose (mg/dL)  55 L  72 L   (75-99)  mg/dL


 


Calcium    7.2 L  (8.4-10.2)  mg/dL


 


Alkaline Phosphatase    130 H  ()  U/L


 


Total Protein    5.1 L  (6.3-8.2)  g/dL


 


Albumin    2.1 L  (3.5-5.0)  g/dL














  07/29/20 07/29/20 Range/Units





  06:32 12:16 


 


RBC  3.48 L   (4.30-5.90)  m/uL


 


Hgb  9.1 L   (13.0-17.5)  gm/dL


 


Hct  29.0 L   (39.0-53.0)  %


 


Plt Count  138 L   (150-450)  k/uL


 


Lymphocytes #  0.8 L   (1.0-4.8)  k/uL


 


Sodium    (137-145)  mmol/L


 


BUN    (9-20)  mg/dL


 


POC Glucose (mg/dL)   134 H  (75-99)  mg/dL


 


Calcium    (8.4-10.2)  mg/dL


 


Alkaline Phosphatase    ()  U/L


 


Total Protein    (6.3-8.2)  g/dL


 


Albumin    (3.5-5.0)  g/dL








                      Microbiology - Last 24 Hours (Table)











 07/28/20 14:59 Gram Stain - Preliminary





 Ascites Fluid Body Fluid Culture - Preliminary


 


 07/28/20 14:59 Anaerobic Culture - Preliminary





 Ascites Fluid 


 


 07/27/20 18:23 Blood Culture - Preliminary





 Blood    No Growth after 24 hours

## 2020-07-29 NOTE — P.CONS
History of Present Illness





- Reason for Consult


Consult date: 07/29/20


Fever 


Requesting physician: Marlin Tafoya





- Chief Complaint


Fever x 1 day





- History of Present Illness


Patient is a 75-year-old  male Past medical history significant for 

cirrhosis of the liver this patient has been brought into the ER on 07/27/2020 

for evaluation of fever that the pain started that day the sed rate here the 

patient did have symptoms of some confusion and mental status, patient was 

brought into the ER on the wound.  The patient did have a fever of 102 

Fahrenheit patient did have initial workup  Including a chest x-ray that was 

reported negative patient did have a negative UA abdominal ultrasound was done 

with evidence of ascites the patient is status post abdominal paracentesis with 

removal of 2.2 L of serous fluid, fluid was also sent for analysis and it shows 

a white count of 195 with mostly neutrophils patient has been empirically t

reated with Rocephin 1 g every 12 hours infectious disease was consulted last 

evening for further management of antibiotic therapy, since the patient has been

started on Rocephin the patient fever has resolved patient is feeling better 

mentation is back to baseline, the patient denies having any headache nor URI 

symptoms with chest pain shortness of breath or cough denies any abdominal pain 

and no urinary symptoms and no  worsening diarrhea








Review of Systems


Positive point has been  mentioned in the HPI rest of the systems are negative








Past Medical History


Past Medical History: Chest Pain / Angina, Heart Failure, CVA/TIA, Diabetes 

Mellitus, Hyperlipidemia, Hypertension, Osteoarthritis (OA)


Additional Past Medical History / Comment(s): incontinent of stools 

intermittently. low iron levels, possible GI bleed-dark tarry stools per son, 

Son stated "has had recent falls related to blood sugar going low 60s"- 2017 

and 2018 had episodes of CHF approx 7-10 days post receiving flu 

vaccine,TIA,Type2 IDDM


History of Any Multi-Drug Resistant Organisms: None Reported


Past Surgical History: Hernia Repair


Additional Past Surgical History / Comment(s): 9/11/18 robot assisted 

laprascopic umbilical hernia repair with mesh., 6/10/19 repair recurrent 

incarerated hernia


Past Anesthesia/Blood Transfusion Reactions: No Reported Reaction


Additional Past Anesthesia/Blood Transfusion Reaction / Comm: never had 

anesthesia


Smoking Status: Current every day smoker





- Past Family History


  ** Mother


Family Medical History: Cancer


Additional Family Medical History / Comment(s): breast cancer





  ** Father


Family Medical History: Unable to Obtain





  ** Sister(s)


Family Medical History: Cancer





  ** Brother(s)


Family Medical History: Diabetes Mellitus





Medications and Allergies


                                Home Medications











 Medication  Instructions  Recorded  Confirmed  Type


 


Benazepril HCl 40 mg PO QAM 12/03/17 07/27/20 History


 


Aspirin 81 mg PO DAILY 12/04/17 07/27/20 History


 


Tamsulosin [Flomax] 0.4 mg PO HS 12/04/17 07/27/20 History


 


Isosorbide Mononitrate ER [Imdur] 30 mg PO QAM 09/05/18 07/27/20 History


 


Ferrous Sulfate [Iron (65  mg PO DAILY #30 tab 12/03/18 07/27/20 Rx





Elemental)]    


 


ALPRAZolam [Xanax] 0.25 mg PO Q6HR PRN  tab 03/07/20 07/27/20 Rx


 


Cyanocobalamin (Vitamin B-12) 1,000 mcg PO DAILY 06/26/20 07/27/20 History





[Vitamin B-12]    


 


Nitroglycerin Sl Tab (0.3mg) 0.3 mg SUBLINGUAL Q5M PRN 06/26/20 07/27/20 History


 


carvediloL [Coreg*] 12.5 mg PO BID-W/MEALS  tab 07/01/20 07/27/20 Rx


 


Furosemide [Lasix] 40 mg PO TID 07/27/20 07/27/20 History


 


INSULIN ASPART (NovoLOG) [NovoLOG 4 unit SQ AC-TID 07/27/20 07/27/20 History





(formulary)]    


 


Insulin Glargine,Hum.rec.anlog 40 unit SQ QAM 07/27/20 07/27/20 History





[Basaglar Kwikpen U-100]    


 


Multivitamins, Thera [Multivitamin 1 tab PO DAILY 07/27/20 07/27/20 History





(formulary)]    


 


Spironolactone [Aldactone] 25 mg PO BID 07/27/20 07/27/20 History








                                    Allergies











Allergy/AdvReac Type Severity Reaction Status Date / Time


 


No Known Allergies Allergy   Verified 07/27/20 20:26














Physical Exam


Vitals: 


                                   Vital Signs











  Temp Pulse Resp BP Pulse Ox


 


 07/29/20 12:00  98.1 F  58 L  16  136/63  95


 


 07/29/20 09:40  98.0 F  63  16  118/54  97


 


 07/29/20 04:00  98.7 F  74  16  126/61  96


 


 07/28/20 23:49   70  16  


 


 07/28/20 23:47  99.2 F  70  16  126/61  94 L


 


 07/28/20 21:45   63  18  


 


 07/28/20 20:45  97.9 F  63  18  117/59  98


 


 07/28/20 16:00    16  








                                Intake and Output











 07/29/20 07/29/20 07/29/20





 06:59 14:59 22:59


 


Intake Total 50 952 


 


Output Total 950  


 


Balance -900 952 


 


Intake:   


 


  Intake, IV Titration 50  





  Amount   


 


    cefTRIAXone 1 gm In 50  





    Sodium Chloride 0.9% 50   





    ml @ 100 mls/hr IVPB   





    Q12HR [facility] Rx#:[id number]   


 


  Oral  952 


 


Output:   


 


  Urine 950  


 


Other:   


 


  Voiding Method Urinal Urinal 


 


  # Voids 2 1 2


 


  Weight 102 kg  











GENERAL DESCRIPTION: An elderly male lying in bed, no distress. No tachypnea or 

accessory muscle of respiration use.


HEENT: Shows Pallor , no scleral icterus. Oral mucous membrane is dry. No 

pharyngeal erythema or thrush


NECK: Trachea central, no thyromegaly.


LUNGS: Unlabored breathing. Clear to auscultation anteriorly. No wheeze or 

crackle.


HEART: S1, S2, regular rate and rhythm. No loud murmur


ABDOMEN: Soft, no tenderness , guarding or rigidity, no organomegaly


EXTREMITIES: 2+ edema feet


SKIN: No rash, no masses palpable.


NEUROLOGICAL: The patient is awake, alert, oriented x3, mood and affect normal.

















Results


CBC & Chem 7: 


                                 07/29/20 06:32





                                 07/29/20 06:32


Labs: 


                  Abnormal Lab Results - Last 24 Hours (Table)











  07/29/20 07/29/20 07/29/20 Range/Units





  01:57 02:12 06:32 


 


RBC     (4.30-5.90)  m/uL


 


Hgb     (13.0-17.5)  gm/dL


 


Hct     (39.0-53.0)  %


 


Plt Count     (150-450)  k/uL


 


Lymphocytes #     (1.0-4.8)  k/uL


 


Sodium    135 L  (137-145)  mmol/L


 


BUN    25 H  (9-20)  mg/dL


 


POC Glucose (mg/dL)  55 L  72 L   (75-99)  mg/dL


 


Calcium    7.2 L  (8.4-10.2)  mg/dL


 


Alkaline Phosphatase    130 H  ()  U/L


 


Total Protein    5.1 L  (6.3-8.2)  g/dL


 


Albumin    2.1 L  (3.5-5.0)  g/dL














  07/29/20 07/29/20 Range/Units





  06:32 12:16 


 


RBC  3.48 L   (4.30-5.90)  m/uL


 


Hgb  9.1 L   (13.0-17.5)  gm/dL


 


Hct  29.0 L   (39.0-53.0)  %


 


Plt Count  138 L   (150-450)  k/uL


 


Lymphocytes #  0.8 L   (1.0-4.8)  k/uL


 


Sodium    (137-145)  mmol/L


 


BUN    (9-20)  mg/dL


 


POC Glucose (mg/dL)   134 H  (75-99)  mg/dL


 


Calcium    (8.4-10.2)  mg/dL


 


Alkaline Phosphatase    ()  U/L


 


Total Protein    (6.3-8.2)  g/dL


 


Albumin    (3.5-5.0)  g/dL








                      Microbiology - Last 24 Hours (Table)











 07/28/20 14:59 Gram Stain - Preliminary





 Ascites Fluid Body Fluid Culture - Preliminary


 


 07/28/20 14:59 Anaerobic Culture - Preliminary





 Ascites Fluid 


 


 07/27/20 18:23 Blood Culture - Preliminary





 Blood    No Growth after 24 hours














Assessment and Plan


Assessment: 


1- patient presented to hospital with fever along with mental status changes 

this patient with underlying cirrhosis so far workup including a chest x-ray 

that has been negative and the patient did not have any respiratory symptoms 

patient also have a negative UA patient did have abdominal ascites and status 

post paracentesis with elevated white count clinical suspicious for spontaneous 

bacterial peritonitis likely from enteric gram-negative pathogen and the patient

 seemed to clinically respond to the Rocephin 





(1) Spontaneous bacterial peritonitis


Current Visit: Yes   Status: Acute   Code(s): K65.2 - SPONTANEOUS BACTERIAL 

PERITONITIS   SNOMED Code(s): 51052008


   





(2) Fever


Current Visit: Yes   Status: Acute   Code(s): R50.9 - FEVER, UNSPECIFIED   

SNOMED Code(s): 303521960


   


Plan: 


1- we will switch Rocephin to 2 g daily and the patient has clinically responded

 to it


2- if continued to improve clinically therapy with oral Ceftin


We will follow on clinical condition and cultures to further adjust medication 

if needed


Thank you for this consultation will follow this patient with you

## 2020-07-30 LAB
AFP-TM SERPL-MCNC: <2.5 NG/ML (ref 0–7.9)
ALBUMIN SERPL-MCNC: 2.3 G/DL (ref 3.5–5)
ALP SERPL-CCNC: 125 U/L (ref 38–126)
ALT SERPL-CCNC: 9 U/L (ref 4–49)
ANION GAP SERPL CALC-SCNC: 5 MMOL/L
AST SERPL-CCNC: 15 U/L (ref 17–59)
BASOPHILS # BLD AUTO: 0 K/UL (ref 0–0.2)
BASOPHILS NFR BLD AUTO: 0 %
BUN SERPL-SCNC: 20 MG/DL (ref 9–20)
CALCIUM SPEC-MCNC: 7.6 MG/DL (ref 8.4–10.2)
CHLORIDE SERPL-SCNC: 100 MMOL/L (ref 98–107)
CO2 SERPL-SCNC: 30 MMOL/L (ref 22–30)
EOSINOPHIL # BLD AUTO: 0.1 K/UL (ref 0–0.7)
EOSINOPHIL NFR BLD AUTO: 1 %
ERYTHROCYTE [DISTWIDTH] IN BLOOD BY AUTOMATED COUNT: 3.69 M/UL (ref 4.3–5.9)
ERYTHROCYTE [DISTWIDTH] IN BLOOD: 15.2 % (ref 11.5–15.5)
GLUCOSE BLD-MCNC: 115 MG/DL (ref 75–99)
GLUCOSE BLD-MCNC: 126 MG/DL (ref 75–99)
GLUCOSE BLD-MCNC: 136 MG/DL (ref 75–99)
GLUCOSE BLD-MCNC: 173 MG/DL (ref 75–99)
GLUCOSE BLD-MCNC: 192 MG/DL (ref 75–99)
GLUCOSE SERPL-MCNC: 115 MG/DL (ref 74–99)
HBV SURFACE AB SERPL IA-ACNC: 3.5 MIU/ML
HCT VFR BLD AUTO: 30.2 % (ref 39–53)
HGB BLD-MCNC: 9.6 GM/DL (ref 13–17.5)
INR PPP: 1.1 (ref ?–1.2)
LYMPHOCYTES # SPEC AUTO: 0.7 K/UL (ref 1–4.8)
LYMPHOCYTES NFR SPEC AUTO: 11 %
MCH RBC QN AUTO: 25.9 PG (ref 25–35)
MCHC RBC AUTO-ENTMCNC: 31.6 G/DL (ref 31–37)
MCV RBC AUTO: 81.9 FL (ref 80–100)
MONOCYTES # BLD AUTO: 0.3 K/UL (ref 0–1)
MONOCYTES NFR BLD AUTO: 6 %
NEUTROPHILS # BLD AUTO: 4.8 K/UL (ref 1.3–7.7)
NEUTROPHILS NFR BLD AUTO: 81 %
PLATELET # BLD AUTO: 158 K/UL (ref 150–450)
POTASSIUM SERPL-SCNC: 3.6 MMOL/L (ref 3.5–5.1)
PROCALCITONIN SERPL-MCNC: 13.66 NG/ML (ref 0.02–0.09)
PROT SERPL-MCNC: 5.4 G/DL (ref 6.3–8.2)
PT BLD: 11.1 SEC (ref 9–12)
SODIUM SERPL-SCNC: 135 MMOL/L (ref 137–145)
WBC # BLD AUTO: 6 K/UL (ref 3.8–10.6)

## 2020-07-30 RX ADMIN — Medication SCH MG: at 08:05

## 2020-07-30 RX ADMIN — INSULIN DETEMIR SCH UNIT: 100 INJECTION, SOLUTION SUBCUTANEOUS at 12:06

## 2020-07-30 RX ADMIN — FUROSEMIDE SCH MG: 40 TABLET ORAL at 08:05

## 2020-07-30 RX ADMIN — INSULIN ASPART SCH: 100 INJECTION, SOLUTION INTRAVENOUS; SUBCUTANEOUS at 07:59

## 2020-07-30 RX ADMIN — THERA TABS SCH EACH: TAB at 08:05

## 2020-07-30 RX ADMIN — ISOSORBIDE MONONITRATE SCH MG: 30 TABLET, EXTENDED RELEASE ORAL at 08:05

## 2020-07-30 RX ADMIN — SPIRONOLACTONE SCH MG: 25 TABLET, FILM COATED ORAL at 08:05

## 2020-07-30 RX ADMIN — FUROSEMIDE SCH MG: 40 TABLET ORAL at 17:41

## 2020-07-30 RX ADMIN — FUROSEMIDE SCH MG: 40 TABLET ORAL at 19:48

## 2020-07-30 RX ADMIN — CYANOCOBALAMIN TAB 500 MCG SCH MCG: 500 TAB at 08:05

## 2020-07-30 RX ADMIN — TAMSULOSIN HYDROCHLORIDE SCH MG: 0.4 CAPSULE ORAL at 19:48

## 2020-07-30 RX ADMIN — ASPIRIN 81 MG CHEWABLE TABLET SCH MG: 81 TABLET CHEWABLE at 08:05

## 2020-07-30 RX ADMIN — SPIRONOLACTONE SCH MG: 25 TABLET, FILM COATED ORAL at 19:48

## 2020-07-30 RX ADMIN — INSULIN ASPART SCH UNIT: 100 INJECTION, SOLUTION INTRAVENOUS; SUBCUTANEOUS at 12:06

## 2020-07-30 RX ADMIN — INSULIN ASPART SCH UNIT: 100 INJECTION, SOLUTION INTRAVENOUS; SUBCUTANEOUS at 17:41

## 2020-07-30 NOTE — P.PN
Subjective


Progress Note Date: 07/30/20








[facility]

fever he was evaluated in the emergency room, his temperature on presentation 

was 102.6 pulse 93 respiration 18 blood pressure 122/58 pulse ox 95% on 3 L 

nasal cannula, patient had no other complaints, he had abdominal distention with

evidence of sinusitis, otherwise he denies any complaints there was no chills no

headache no dizziness no chest pain no shortness of breath no cough no nausea or

vomiting no abdominal pain no diarrhea no burning with urination no frequency or

urgency and no hematuria, he states his fevers started about 24 hours prior to 

presentation, chest x-ray was done in the emergency room and did not reveal any 

evidence of pneumonia, urine analysis was done and did not reveal any evidence 

of urinary tract infection, patient was admitted to medical floor, radiology 

consult was requested for paracentesis, gastroenterology consult and infectious 

disease consultation were requested.  Patient was admitted recently to [facility] at that time he was diagnosed with sinusitis and possible liver 

cirrhosis.








On 07/29/2020 patient was seen and examined on the medical floor he is alert and

oriented 3 in no apparent distress there is no new episodes of fever no chills 

no headache or dizziness no chest pain no shortness of breath no cough no nausea

or vomiting no abdominal pain no diarrhea no burning with urination no frequency

or urgency and no hematuria, which are from the ascites fluid is still pending, 

patient is maintained on empiric antibiotic with Rocephin








On 07/30/2020 patient was seen and examined on the medical floor he is alert and

oriented 3 in no distress there is no fever or chills no headache or dizziness 

no chest pain no shortness of breath no cough no nausea or vomiting no abdominal

pain no diarrhea no burning with urination no frequency or urgency no hematuria.

 Plan to continue IV Rocephin until tomorrow discharge tomorrow on oral Ceftin 

for 10 more days follow-up with Dr. Blank he does outpatient





Objective





- Vital Signs


Vital signs: 


                                   Vital Signs











Temp  97.8 F   07/30/20 16:00


 


Pulse  63   07/30/20 16:00


 


Resp  18   07/30/20 16:00


 


BP  117/56   07/30/20 16:00


 


Pulse Ox  96   07/30/20 16:00








                                 Intake & Output











 07/29/20 07/30/20 07/30/20





 18:59 06:59 18:59


 


Intake Total 952 1250 445


 


Output Total  1500 


 


Balance 952 -250 445


 


Weight  95.4 kg 


 


Intake:   


 


  Intake, IV Titration  50 





  Amount   


 


    cefTRIAXone 1 gm In  50 





    Sodium Chloride 0.9% 50   





    ml @ 100 mls/hr IVPB   





    Q12HR [facility] Rx#:[id number]   


 


  Oral 952 1200 445


 


Output:   


 


  Urine  1500 


 


Other:   


 


  Voiding Method Urinal Urinal Urinal


 


  # Voids 2 2 














- Exam








In general patient is alert and oriented 3 in no apparent distress


HEENT head normocephalic and atraumatic


Neck is supple no JVD no goiter no lymphadenopathy


Chest exam reveals a few scattered rhonchi no wheezing


Cardiac exam reveals regular heart sounds S1 and S2 no gallops no murmurs


Abdomen is soft nontender distended no organomegaly no palpable masses with 

normal bowel sounds


Extremity exam reveals no edema no cyanosis or clubbing








- Labs


CBC & Chem 7: 


                                 07/30/20 06:13





                                 07/30/20 06:13


Labs: 


                  Abnormal Lab Results - Last 24 Hours (Table)











  07/29/20 07/30/20 07/30/20 Range/Units





  20:31 02:00 06:01 


 


RBC     (4.30-5.90)  m/uL


 


Hgb     (13.0-17.5)  gm/dL


 


Hct     (39.0-53.0)  %


 


Lymphocytes #     (1.0-4.8)  k/uL


 


Sodium     (137-145)  mmol/L


 


Glucose     (74-99)  mg/dL


 


POC Glucose (mg/dL)  152 H  136 H  115 H  (75-99)  mg/dL


 


Calcium     (8.4-10.2)  mg/dL


 


AST     (17-59)  U/L


 


C-Reactive Protein     (<10.0)  mg/L


 


Total Protein     (6.3-8.2)  g/dL


 


Albumin     (3.5-5.0)  g/dL


 


Procalcitonin     (0.02-0.09)  ng/mL














  07/30/20 07/30/20 07/30/20 Range/Units





  06:13 06:13 06:13 


 


RBC    3.69 L  (4.30-5.90)  m/uL


 


Hgb    9.6 L  (13.0-17.5)  gm/dL


 


Hct    30.2 L  (39.0-53.0)  %


 


Lymphocytes #    0.7 L  (1.0-4.8)  k/uL


 


Sodium   135 L   (137-145)  mmol/L


 


Glucose   115 H   (74-99)  mg/dL


 


POC Glucose (mg/dL)     (75-99)  mg/dL


 


Calcium   7.6 L   (8.4-10.2)  mg/dL


 


AST   15 L   (17-59)  U/L


 


C-Reactive Protein   74.1 H   (<10.0)  mg/L


 


Total Protein   5.4 L   (6.3-8.2)  g/dL


 


Albumin   2.3 L   (3.5-5.0)  g/dL


 


Procalcitonin  13.66 H    (0.02-0.09)  ng/mL














  07/30/20 07/30/20 Range/Units





  11:51 16:50 


 


RBC    (4.30-5.90)  m/uL


 


Hgb    (13.0-17.5)  gm/dL


 


Hct    (39.0-53.0)  %


 


Lymphocytes #    (1.0-4.8)  k/uL


 


Sodium    (137-145)  mmol/L


 


Glucose    (74-99)  mg/dL


 


POC Glucose (mg/dL)  192 H  173 H  (75-99)  mg/dL


 


Calcium    (8.4-10.2)  mg/dL


 


AST    (17-59)  U/L


 


C-Reactive Protein    (<10.0)  mg/L


 


Total Protein    (6.3-8.2)  g/dL


 


Albumin    (3.5-5.0)  g/dL


 


Procalcitonin    (0.02-0.09)  ng/mL








                      Microbiology - Last 24 Hours (Table)











 07/27/20 18:23 Blood Culture - Preliminary





 Blood    No Growth after 48 hours


 


 07/28/20 14:59 Gram Stain - Preliminary





 Ascites Fluid Body Fluid Culture - Preliminary














Assessment and Plan


Plan: 





1.  Febrile illness cause is unclear, no evidence of pneumonia no evidence of 

urinary tract infection, Covid 19 testing pending


2.  Ascites radiology consult requested for paracentesis 


3.  Underlying history of congestive heart failure systolic and diastolic, 

stable at this time.  with recent admission to the hospital 1 months ago for 

congestive heart failure exacerbation.


4.  Underlying history of hypertension 


5.  Underlying history of hyperlipidemia 


6.  Underlying history of BPH maintained on Flomax 


7.  Underlying history of anxiety disorder





 at this time medication and labs were reviewed consultation for gastroenterol

ogy and infectious disease requested will follow closely 


Case discussed was Dr. Handy, plan for discharge to home tomorrow with oral 

Ceftin 500 mg twice daily for 10 more days

## 2020-07-30 NOTE — PN
PROGRESS NOTE



DATE OF SERVICE:

07/30/2020



REASON FOR FOLLOWUP:

Fever; possible spontaneous bacterial peritonitis.



INTERVAL HISTORY:

The patient is currently afebrile.  The patient is breathing comfortably. The patient

denies having any chest pain or shortness of breath.  Minimal cough.  No nausea, no

vomiting.  No abdominal pain or diarrhea.



PHYSICAL EXAMINATION:

Blood pressure 113/54 with a pulse of 64, temperature 98.2. He is 93% on room air.

General description is an elderly male lying in bed in no distress.

RESPIRATORY SYSTEM: Unlabored breathing with decreased breath sounds at the base. No

wheeze.

HEART: S1, S2.  Regular rate and rhythm.

ABDOMEN: Soft. No tenderness.



LABS:

Hemoglobin 9.6, white count 6.0, BUN of 20, creatinine 1.08.



DIAGNOSTIC IMPRESSION AND PLAN:

Patient with fever, possible spontaneous bacterial peritonitis, as the patient

currently does not have any _____ focus of infection.  Patient seems to clinically

respond to the Rocephin with a plan to finish therapy with oral Ceftin _____ mg twice a

day for another 10 days with close outpatient followup.





MMODL / IJN: [id number] / Job#: [id number]

## 2020-07-30 NOTE — P.CONS
History of Present Illness





- Reason for Consult


Consult date: 07/29/20


Cirrhosis, ascites


Requesting physician: Marlin Tafoya





- Chief Complaint


Altered mental status





- History of Present Illness








75-year-old male with multiple medical comorbidities including diabetes 

mellitus, hypertension, hyperlipidemia, chronic anemia, diastolic and systolic 

heart failure, colonic polyps, nonalcoholic cirrhosis with ascites and gastric 

and esophageal varices on EGD who presented to the hospital due to concerns over

fever and altered mental status.  History is been taken in discussion with the 

patient and his son who is seated bedside.  According to their history the 

patient had been doing well since recent discharge from the hospital.  The 

patient had been compliant with diuretic therapy with Aldactone and Lasix in the

outpatient setting.  As per reports from the patient's son he had been doing 

well the morning prior to developing symptoms, however after the son returned 

from picking up the patient's wife he was found to be confused and febrile.  

Previously evaluation has been significant for hepatomegaly and cirrhosis on 

imaging and the patient also has been found to have varices on EGD performed in 

01/2020 both in the esophagus and stomach and colonoscopy at that time was 

significant for a poor colon prep, diverticulosis and internal hemorrhoids.  On 

current presentation laboratory evaluation was significant for WBC 6.8, 

hemoglobin 9.1, platelet count 259,000, INR 1.1, total bilirubin 0.8, alkaline 

phosphatase 168, AST 14 and ALT 9 with a CRP of 69.  Ammonia level was found to 

be normal 22.  Ultrasound of the abdomen was significant for a coarsened liver 

echotexture suggestive of cirrhosis with splenomegaly, ascites and evidence of 

portal hypertension.  Currently the patient is being seen by the infectious 

disease service with paracentesis performed with fluid studies pending.














Review of Systems





REVIEW OF SYSTEMS:


CONSTITUTIONAL: Denies any weight change or fatigue but patient did have fevers 

prior to presentation.


CARDIOVASCULAR: Denies any chest pain, palpitations high or low blood pressures


RESPIRATORY: Denies any shortness of breath, hemoptysis or cough.  


GENITOURINARY:  No dysuria or hematuria. 


MUSCULOSKELETAL: No focal weakness reported. 


SKIN: Denies any new rashes or lesions, jaundice or pallor. 


PSYCHIATRIC: Denies any depression or anxiety. 


NEUROLOGY: Denies headache, denies any new focal deficits, patient was confused 

on presentation which is improved. 


EARS/NOSE/THROAT: No recent hearing change, congestion, nasal discharge or sore 

throat.


EYES: No pain in eyes, discharge or change in vision. 


GASTROINTESTINAL: As per HPI.





Past Medical History


Past Medical History: Chest Pain / Angina, Heart Failure, CVA/TIA, Diabetes 

Mellitus, Hyperlipidemia, Hypertension, Osteoarthritis (OA)


Additional Past Medical History / Comment(s): incontinent of stools 

intermittently. low iron levels, possible GI bleed-dark tarry stools per son, 

Son stated "has had recent falls related to blood sugar going low 60s"-HX 2017 

and 2018 had episodes of CHF approx 7-10 days post receiving flu 

vaccine,TIA,Type2 IDDM


History of Any Multi-Drug Resistant Organisms: None Reported


Past Surgical History: Hernia Repair


Additional Past Surgical History / Comment(s): 9/11/18 robot assisted 

laprascopic umbilical hernia repair with mesh., 6/10/19 repair recurrent 

incarerated hernia


Past Anesthesia/Blood Transfusion Reactions: No Reported Reaction


Additional Past Anesthesia/Blood Transfusion Reaction / Comm: never had 

anesthesia


Smoking Status: Current every day smoker





- Past Family History


  ** Mother


Family Medical History: Cancer


Additional Family Medical History / Comment(s): breast cancer





  ** Father


Family Medical History: Unable to Obtain





  ** Sister(s)


Family Medical History: Cancer





  ** Brother(s)


Family Medical History: Diabetes Mellitus





Medications and Allergies


                                Home Medications











 Medication  Instructions  Recorded  Confirmed  Type


 


Benazepril HCl 40 mg PO QAM 12/03/17 07/27/20 History


 


Aspirin 81 mg PO DAILY 12/04/17 07/27/20 History


 


Tamsulosin [Flomax] 0.4 mg PO HS 12/04/17 07/27/20 History


 


Isosorbide Mononitrate ER [Imdur] 30 mg PO QAM 09/05/18 07/27/20 History


 


Ferrous Sulfate [Iron (65  mg PO DAILY #30 tab 12/03/18 07/27/20 Rx





Elemental)]    


 


ALPRAZolam [Xanax] 0.25 mg PO Q6HR PRN  tab 03/07/20 07/27/20 Rx


 


Cyanocobalamin (Vitamin B-12) 1,000 mcg PO DAILY 06/26/20 07/27/20 History





[Vitamin B-12]    


 


Nitroglycerin Sl Tab (0.3mg) 0.3 mg SUBLINGUAL Q5M PRN 06/26/20 07/27/20 History


 


carvediloL [Coreg*] 12.5 mg PO BID-W/MEALS  tab 07/01/20 07/27/20 Rx


 


Furosemide [Lasix] 40 mg PO TID 07/27/20 07/27/20 History


 


INSULIN ASPART (NovoLOG) [NovoLOG 4 unit SQ AC-TID 07/27/20 07/27/20 History





(formulary)]    


 


Insulin Glargine,Hum.rec.anlog 40 unit SQ QAM 07/27/20 07/27/20 History





[Basaglar Kwikpen U-100]    


 


Multivitamins, Thera [Multivitamin 1 tab PO DAILY 07/27/20 07/27/20 History





(formulary)]    


 


Spironolactone [Aldactone] 25 mg PO BID 07/27/20 07/27/20 History








                                    Allergies











Allergy/AdvReac Type Severity Reaction Status Date / Time


 


No Known Allergies Allergy   Verified 07/27/20 20:26














Physical Exam


Vitals: 


                                   Vital Signs











  Temp Pulse Resp BP Pulse Ox


 


 07/29/20 12:00  98.1 F  58 L  16  136/63  95


 


 07/29/20 09:40  98.0 F  63  16  118/54  97


 


 07/29/20 04:00  98.7 F  74  16  126/61  96


 


 07/28/20 23:49   70  16  


 


 07/28/20 23:47  99.2 F  70  16  126/61  94 L


 


 07/28/20 21:45   63  18  


 


 07/28/20 20:45  97.9 F  63  18  117/59  98


 


 07/28/20 16:00    16  


 


 07/28/20 15:40   52 L  16  116/62  99


 


 07/28/20 15:31   54 L  14  115/60 


 


 07/28/20 15:20   54 L  16  110/61  98


 


 07/28/20 15:10   54 L  16  121/62  99


 


 07/28/20 14:59   54 L  14  130/60  98








                                Intake and Output











 07/28/20 07/29/20 07/29/20





 22:59 06:59 14:59


 


Intake Total 240 50 476


 


Output Total 250 950 


 


Balance -10 -900 476


 


Intake:   


 


  Intake, IV Titration  50 





  Amount   


 


    cefTRIAXone 1 gm In  50 





    Sodium Chloride 0.9% 50   





    ml @ 100 mls/hr IVPB   





    Q12HR [facility] Rx#:[id number]   


 


  Oral 240  476


 


Output:   


 


  Urine 250 950 


 


Other:   


 


  Voiding Method Urinal Urinal Urinal


 


  # Voids 1 2 1


 


  Weight  102 kg 














On physical examination, patient appears comfortable in no apparent distress. 


HEAD: Normocephalic, atraumatic. 


EYES: No scleral icterus. No conjunctival injection. 


MOUTH: No lesions, tongue midline. 


NECK: Trachea midline, no gross abnormalities. 


CHEST: Decreased air entry in all lung fields. 


HEART: S1-S2 appreciated. 


ABDOMEN: Soft, obese, nontender to palpation. Bowel sounds are positive. No 

organomegaly.  No guarding or rigidity.


EXTREMITIES: No pedal edema. 


SKIN: No rashes, no jaundice. 


NEUROLOGIC: Alert and oriented x3 with no asterixis noted on physical exam. 





Results


CBC & Chem 7: 


                                 07/29/20 06:32





                                 07/29/20 06:32


Labs: 


                  Abnormal Lab Results - Last 24 Hours (Table)











  07/29/20 07/29/20 07/29/20 Range/Units





  01:57 02:12 06:32 


 


RBC     (4.30-5.90)  m/uL


 


Hgb     (13.0-17.5)  gm/dL


 


Hct     (39.0-53.0)  %


 


Plt Count     (150-450)  k/uL


 


Lymphocytes #     (1.0-4.8)  k/uL


 


Sodium    135 L  (137-145)  mmol/L


 


BUN    25 H  (9-20)  mg/dL


 


POC Glucose (mg/dL)  55 L  72 L   (75-99)  mg/dL


 


Calcium    7.2 L  (8.4-10.2)  mg/dL


 


Alkaline Phosphatase    130 H  ()  U/L


 


Total Protein    5.1 L  (6.3-8.2)  g/dL


 


Albumin    2.1 L  (3.5-5.0)  g/dL














  07/29/20 07/29/20 Range/Units





  06:32 12:16 


 


RBC  3.48 L   (4.30-5.90)  m/uL


 


Hgb  9.1 L   (13.0-17.5)  gm/dL


 


Hct  29.0 L   (39.0-53.0)  %


 


Plt Count  138 L   (150-450)  k/uL


 


Lymphocytes #  0.8 L   (1.0-4.8)  k/uL


 


Sodium    (137-145)  mmol/L


 


BUN    (9-20)  mg/dL


 


POC Glucose (mg/dL)   134 H  (75-99)  mg/dL


 


Calcium    (8.4-10.2)  mg/dL


 


Alkaline Phosphatase    ()  U/L


 


Total Protein    (6.3-8.2)  g/dL


 


Albumin    (3.5-5.0)  g/dL








                      Microbiology - Last 24 Hours (Table)











 07/28/20 14:59 Gram Stain - Preliminary





 Ascites Fluid Body Fluid Culture - Preliminary


 


 07/28/20 14:59 Anaerobic Culture - Preliminary





 Ascites Fluid 


 


 07/27/20 18:23 Blood Culture - Preliminary





 Blood    No Growth after 24 hours











US - abdomen: report reviewed (Ultrasound of the abdomen was significant for a 

coarsened liver echotexture suggestive of cirrhosis with splenomegaly, ascites 

and evidence of portal hypertension.)





Assessment and Plan


(1) Cirrhosis


Narrative/Plan: 


75-year-old male with multiple medical comorbidities including findings of 

esophageal and gastric varices on EGD, ascites and cirrhosis recently diagnosed 

and secondary to suspected nonalcoholic steatohepatitis who presented due to 

altered mental status and fevers.  No elevation in ammonia our clinical signs of

 encephalopathy.  Patient has been started on antibiotic therapy.  Unclear if 

presentation is secondary to spontaneous bacterial peritonitis or other source 

of infection.  Patient currently being seen by the infectious disease service.


Current Visit: Yes   Status: Acute   Code(s): K74.60 - UNSPECIFIED CIRRHOSIS OF 

LIVER   SNOMED Code(s): 60872945


   





(2) Ascites


Current Visit: No   Status: Acute   Code(s): R18.8 - OTHER ASCITES   SNOMED 

Code(s): 295355919


   





(3) Esophageal varices determined by endoscopy


Current Visit: No   Status: Acute   Code(s): I85.00 - ESOPHAGEAL VARICES WITHOUT

 BLEEDING   SNOMED Code(s): 78539569


   


Plan: 





Supportive care


Okay for sodium restricted diet


Continue to monitor CBC, BMP, LFTs and clinically


Infectious disease service following


Continue antibiotic therapy as per the infectious disease service


Fluid cultures and studies from paracentesis pending


Continue Lasix 40 mg 3 times a day and Aldactone 25 mg twice a day


Thank you for allowing us to participate in the care of the patient we will 

continue to follow [place]

## 2020-07-31 VITALS — TEMPERATURE: 98.1 F | DIASTOLIC BLOOD PRESSURE: 63 MMHG | SYSTOLIC BLOOD PRESSURE: 132 MMHG | HEART RATE: 61 BPM

## 2020-07-31 LAB
GLUCOSE BLD-MCNC: 146 MG/DL (ref 75–99)
GLUCOSE BLD-MCNC: 195 MG/DL (ref 75–99)

## 2020-07-31 RX ADMIN — INSULIN DETEMIR SCH UNIT: 100 INJECTION, SOLUTION SUBCUTANEOUS at 07:10

## 2020-07-31 RX ADMIN — ASPIRIN 81 MG CHEWABLE TABLET SCH MG: 81 TABLET CHEWABLE at 09:29

## 2020-07-31 RX ADMIN — THERA TABS SCH EACH: TAB at 09:28

## 2020-07-31 RX ADMIN — SPIRONOLACTONE SCH MG: 25 TABLET, FILM COATED ORAL at 09:29

## 2020-07-31 RX ADMIN — INSULIN ASPART SCH UNIT: 100 INJECTION, SOLUTION INTRAVENOUS; SUBCUTANEOUS at 12:17

## 2020-07-31 RX ADMIN — CYANOCOBALAMIN TAB 500 MCG SCH MCG: 500 TAB at 09:29

## 2020-07-31 RX ADMIN — INSULIN ASPART SCH UNIT: 100 INJECTION, SOLUTION INTRAVENOUS; SUBCUTANEOUS at 07:10

## 2020-07-31 RX ADMIN — FUROSEMIDE SCH MG: 40 TABLET ORAL at 09:28

## 2020-07-31 RX ADMIN — ISOSORBIDE MONONITRATE SCH MG: 30 TABLET, EXTENDED RELEASE ORAL at 09:29

## 2020-07-31 RX ADMIN — Medication SCH MG: at 09:29

## 2020-07-31 NOTE — PN
PROGRESS NOTE



DATE OF SERVICE:

07/31/2020



REASON FOR FOLLOWUP:

Fever, possible medical peritonitis.



INTERVAL HISTORY:

Patient is currently afebrile.  The patient overall is feeling better.  Breathing

comfortably.  The patient denies having any chest pain.  No shortness of breath or

cough.  No nausea, no vomiting.  No abdominal pain or diarrhea.  Overall feeling better

and wants to go home.



PHYSICAL EXAMINATION:

Blood pressure is 132/63 with a pulse of 61, temperature is 98.1.

General description is an elderly male, lying in bed in no distress.

RESPIRATORY SYSTEM: Unlabored breathing, clear to auscultation anteriorly.

HEART:  S1, S2.  Regular rate and rhythm.

ABDOMEN:  Soft, no tenderness.



LABS:

No new labs have been obtained today.  Blood cultures have been negative.



DIAGNOSTIC IMPRESSION AND PLAN:

Patient admitted to the hospital with fever, concern likely for a medical peritonitis.

Plan at this time is to continue with Rocephin while the patient finished therapy with

Ceftin 500 mg twice a day for another 10 days and close outpatient followup.





MMODL / IJN: [id number] / Job#: [id number]

## 2020-07-31 NOTE — P.PN
Subjective


Progress Note Date: 07/30/20


Principal diagnosis: 





Cirrhosis





Patient is seen lying in bed today reporting that he is feeling well.  No 

abdominal pain.  No fevers.  Tolerating diet.





Objective





- Vital Signs


Vital signs: 


                                   Vital Signs











Temp  98.2 F   07/30/20 19:51


 


Pulse  66   07/30/20 19:52


 


Resp  18   07/30/20 19:52


 


BP  146/64   07/30/20 19:51


 


Pulse Ox  97   07/30/20 19:51








                                 Intake & Output











 07/30/20 07/30/20 07/31/20





 06:59 18:59 06:59


 


Intake Total 1250 670 


 


Output Total 1500  


 


Balance -250 670 


 


Weight 95.4 kg  


 


Intake:   


 


  Intake, IV Titration 50  





  Amount   


 


    cefTRIAXone 1 gm In 50  





    Sodium Chloride 0.9% 50   





    ml @ 100 mls/hr IVPB   





    Q12HR [facility] Rx#:[id number]   


 


  Oral 1200 670 


 


Output:   


 


  Urine 1500  


 


Other:   


 


  Voiding Method Urinal Urinal Urinal


 


  # Voids 2  














- Exam





On physical examination, patient appears comfortable in no apparent distress. 


HEAD: Normocephalic, atraumatic. 


EYES: No scleral icterus. No conjunctival injection. 


MOUTH: No lesions, tongue midline. 


NECK: Trachea midline, no gross abnormalities. 


ABDOMEN: Soft, obese. Bowel sounds are positive. No organomegaly.  No guarding 

or rigidity.


EXTREMITIES: No pedal edema. 


SKIN: No rashes, no jaundice. 


NEUROLOGIC: Alert and oriented x3.  No focal deficits. 





- Labs


CBC & Chem 7: 


                                 07/30/20 06:13





                                 07/30/20 06:13


Labs: 


                  Abnormal Lab Results - Last 24 Hours (Table)











  07/29/20 07/30/20 07/30/20 Range/Units





  20:31 02:00 06:01 


 


RBC     (4.30-5.90)  m/uL


 


Hgb     (13.0-17.5)  gm/dL


 


Hct     (39.0-53.0)  %


 


Lymphocytes #     (1.0-4.8)  k/uL


 


Sodium     (137-145)  mmol/L


 


Glucose     (74-99)  mg/dL


 


POC Glucose (mg/dL)  152 H  136 H  115 H  (75-99)  mg/dL


 


Calcium     (8.4-10.2)  mg/dL


 


AST     (17-59)  U/L


 


C-Reactive Protein     (<10.0)  mg/L


 


Total Protein     (6.3-8.2)  g/dL


 


Albumin     (3.5-5.0)  g/dL


 


Procalcitonin     (0.02-0.09)  ng/mL














  07/30/20 07/30/20 07/30/20 Range/Units





  06:13 06:13 06:13 


 


RBC    3.69 L  (4.30-5.90)  m/uL


 


Hgb    9.6 L  (13.0-17.5)  gm/dL


 


Hct    30.2 L  (39.0-53.0)  %


 


Lymphocytes #    0.7 L  (1.0-4.8)  k/uL


 


Sodium   135 L   (137-145)  mmol/L


 


Glucose   115 H   (74-99)  mg/dL


 


POC Glucose (mg/dL)     (75-99)  mg/dL


 


Calcium   7.6 L   (8.4-10.2)  mg/dL


 


AST   15 L   (17-59)  U/L


 


C-Reactive Protein   74.1 H   (<10.0)  mg/L


 


Total Protein   5.4 L   (6.3-8.2)  g/dL


 


Albumin   2.3 L   (3.5-5.0)  g/dL


 


Procalcitonin  13.66 H    (0.02-0.09)  ng/mL














  07/30/20 07/30/20 07/30/20 Range/Units





  11:51 16:50 20:05 


 


RBC     (4.30-5.90)  m/uL


 


Hgb     (13.0-17.5)  gm/dL


 


Hct     (39.0-53.0)  %


 


Lymphocytes #     (1.0-4.8)  k/uL


 


Sodium     (137-145)  mmol/L


 


Glucose     (74-99)  mg/dL


 


POC Glucose (mg/dL)  192 H  173 H  126 H  (75-99)  mg/dL


 


Calcium     (8.4-10.2)  mg/dL


 


AST     (17-59)  U/L


 


C-Reactive Protein     (<10.0)  mg/L


 


Total Protein     (6.3-8.2)  g/dL


 


Albumin     (3.5-5.0)  g/dL


 


Procalcitonin     (0.02-0.09)  ng/mL








                      Microbiology - Last 24 Hours (Table)











 07/28/20 14:59 Gram Stain - Preliminary





 Ascites Fluid Body Fluid Culture - Preliminary


 


 07/27/20 18:23 Blood Culture - Preliminary





 Blood    No Growth after 48 hours














Assessment and Plan


(1) Cirrhosis


Narrative/Plan: 


75-year-old male with multiple medical comorbidities including findings of 

esophageal and gastric varices on EGD, ascites and cirrhosis recently diagnosed 

and secondary to suspected nonalcoholic steatohepatitis who presented due to 

altered mental status and fevers.  No elevation in ammonia our clinical signs of

encephalopathy.  Patient has been started on antibiotic therapy.  Unclear if 

presentation is secondary to spontaneous bacterial peritonitis or other source 

of infection, however fluid studies not consistent with peritonitis.  Patient 

currently being seen by the infectious disease service.


Status: Acute   Code(s): K74.60 - UNSPECIFIED CIRRHOSIS OF LIVER   SNOMED 

Code(s): 20396301


   





(2) Ascites


Status: Acute   Code(s): R18.8 - OTHER ASCITES   SNOMED Code(s): 117522164


   





(3) Esophageal varices determined by endoscopy


Status: Acute   Code(s): I85.00 - ESOPHAGEAL VARICES WITHOUT BLEEDING   SNOMED 

Code(s): 39670046


   


Plan: 





Supportive care


Okay for sodium restricted diet


Continue to monitor CBC, BMP, LFTs and clinically


Infectious disease service following


Continue antibiotic therapy as per the infectious disease service


Fluid cultures and studies from paracentesis pending, however analysis not 

consistent with peritonitis


Continue Lasix 40 mg 3 times a day and Aldactone 25 mg twice a day


Thank you for allowing us to participate in the care of the patient we will 

continue to follow

## 2020-07-31 NOTE — P.DS
Providers


Date of admission: 


07/27/20 20:18





Expected date of discharge: 07/31/20


Attending physician: 


Marlin Tafoya





Consults: 





                                        





07/27/20 20:19


Consult Physician Urgent 


   Consulting Provider: Sadie Hoffman


   Consult Reason/Comments: liver disease, fever


   Do you want consulting provider notified?: Yes





07/28/20 17:18


Consult Physician Routine 


   Consulting Provider: Endy Handy


   Consult Reason/Comments: fever


   Do you want consulting provider notified?: Yes


Consult Physician Routine 


   Consulting Provider: Franklin Marcus


   Consult Reason/Comments: ascites


   Do you want consulting provider notified?: Yes











Primary care physician: 


Merry Grimaldo





[facility] Course: 





Diagnoses on discharge:











1.  Febrile illness cause is unclear, no evidence of pneumonia no evidence of 

urinary tract infection, Covid 19 testing pending


2.  Ascites radiology consult requested for paracentesis , fluid culture was 

negative however infectious disease felt that most likely diagnosis is 

spontaneous bacterial peritonitis he was kept on IV Rocephin during this 

admission, he was discharged home on oral Ceftin 500 mg twice daily for 10 days


3.  Underlying history of congestive heart failure systolic and diastolic, 

stable at this time.  with recent admission to the hospital 1 months ago for 

congestive heart failure exacerbation.


4.  Underlying history of hypertension 


5.  Underlying history of hyperlipidemia 


6.  Underlying history of BPH maintained on Flomax 


7.  Underlying history of anxiety disorder








Hospital course:














[facility] emergency room with a chief complaint of

fever he was evaluated in the emergency room, his temperature on presentation 

was 102.6 pulse 93 respiration 18 blood pressure 122/58 pulse ox 95% on 3 L 

nasal cannula, patient had no other complaints, he had abdominal distention with

evidence of sinusitis, otherwise he denies any complaints there was no chills no

headache no dizziness no chest pain no shortness of breath no cough no nausea or

vomiting no abdominal pain no diarrhea no burning with urination no frequency or

urgency and no hematuria, he states his fevers started about 24 hours prior to 

presentation, chest x-ray was done in the emergency room and did not reveal any 

evidence of pneumonia, urine analysis was done and did not reveal any evidence 

of urinary tract infection, patient was admitted to medical floor, radiology 

consult was requested for paracentesis, gastroenterology consult and infectious 

disease consultation were requested.  Patient was admitted recently to [facility] at that time he was diagnosed with sinusitis and possible liver 

cirrhosis.








On 07/29/2020 patient was seen and examined on the medical floor he is alert and

oriented 3 in no apparent distress there is no new episodes of fever no chills 

no headache or dizziness no chest pain no shortness of breath no cough no nausea

or vomiting no abdominal pain no diarrhea no burning with urination no frequency

or urgency and no hematuria, which are from the ascites fluid is still pending, 

patient is maintained on empiric antibiotic with Rocephin








On 07/30/2020 patient was seen and examined on the medical floor he is alert and

oriented 3 in no distress there is no fever or chills no headache or dizziness 

no chest pain no shortness of breath no cough no nausea or vomiting no abdominal

pain no diarrhea no burning with urination no frequency or urgency no hematuria.

 Plan to continue IV Rocephin until tomorrow discharge tomorrow on oral Ceftin 

for 10 more days follow-up with Dr. Blank he does outpatient





On 07/31/2020 patient was seen and examined on the medical floor he is alert and

oriented 3 he denies any complaint there is no fever or chills no headache or 

dizziness no chest pain no shortness of breath no cough no nausea or vomiting no

abdominal pain no diarrhea no burning with urination no frequency or urgency and

no hematuria.  All culture were negative cause of febrile illness on 

presentation is not 100% clear however infectious disease felt that this is most

likely spontaneous bacterial peritonitis and it responded well to Rocephin 

during this admission, patient was discharged home on Ceftin 500 mg twice daily 

for 10 days he will be followed up by Dr. Handy infectious disease within 10 

days.  Patient should also follow with Dr. Grimaldo his primary care physician in 

1-2 weeks


Patient Condition at Discharge: Serious





Plan - Discharge Summary


Discharge Rx Participant: Yes


New Discharge Prescriptions: 


New


   Cefuroxime Axetil [Ceftin] 500 mg PO BID 10 Days #20 tab





Continue


   Benazepril HCl 40 mg PO QAM


   Aspirin 81 mg PO DAILY


   Tamsulosin [Flomax] 0.4 mg PO HS


   Isosorbide Mononitrate ER [Imdur] 30 mg PO QAM


   Ferrous Sulfate [Iron (65 MG Elemental)] 325 mg PO DAILY #30 tab


   ALPRAZolam [Xanax] 0.25 mg PO Q6HR PRN  tab


     PRN Reason: Mild Anxiety


   Nitroglycerin Sl Tab (0.3mg) 0.3 mg SUBLINGUAL Q5M PRN


     PRN Reason: Chest Pain


   Cyanocobalamin (Vitamin B-12) [Vitamin B-12] 1,000 mcg PO DAILY


   carvediloL [Coreg*] 12.5 mg PO BID-W/MEALS  tab


   Spironolactone [Aldactone] 25 mg PO BID


   Insulin Glargine,Hum.rec.anlog [Basaglar Kwikpen U-100] 40 unit SQ QAM


   INSULIN ASPART (NovoLOG) [NovoLOG (formulary)] 4 unit SQ AC-TID


   Multivitamins, Thera [Multivitamin (formulary)] 1 tab PO DAILY


   Furosemide [Lasix] 40 mg PO TID


Discharge Medication List





Benazepril HCl 40 mg PO QAM 12/03/17 [History]


Aspirin 81 mg PO DAILY 12/04/17 [History]


Tamsulosin [Flomax] 0.4 mg PO HS 12/04/17 [History]


Isosorbide Mononitrate ER [Imdur] 30 mg PO QAM 09/05/18 [History]


Ferrous Sulfate [Iron (65 MG Elemental)] 325 mg PO DAILY #30 tab 12/03/18 [Rx]


ALPRAZolam [Xanax] 0.25 mg PO Q6HR PRN  tab 03/07/20 [Rx]


Cyanocobalamin (Vitamin B-12) [Vitamin B-12] 1,000 mcg PO DAILY 06/26/20 

[History]


Nitroglycerin Sl Tab (0.3mg) 0.3 mg SUBLINGUAL Q5M PRN 06/26/20 [History]


carvediloL [Coreg*] 12.5 mg PO BID-W/MEALS  tab 07/01/20 [Rx]


Furosemide [Lasix] 40 mg PO TID 07/27/20 [History]


INSULIN ASPART (NovoLOG) [NovoLOG (formulary)] 4 unit SQ AC-TID 07/27/20 

[History]


Insulin Glargine,Hum.rec.anlog [Basaglar Kwikpen U-100] 40 unit SQ QAM 07/27/20 

[History]


Multivitamins, Thera [Multivitamin (formulary)] 1 tab PO DAILY 07/27/20 

[History]


Spironolactone [Aldactone] 25 mg PO BID 07/27/20 [History]


Cefuroxime Axetil [Ceftin] 500 mg PO BID 10 Days #20 tab 07/31/20 [Rx]








Follow up Appointment(s)/Referral(s): 


Merry Grimaldo MD [Primary Care Provider] - 1-2 days

## 2020-08-17 ENCOUNTER — HOSPITAL ENCOUNTER (INPATIENT)
Dept: HOSPITAL 47 - EC | Age: 75
LOS: 4 days | Discharge: HOME | DRG: 871 | End: 2020-08-21
Attending: INTERNAL MEDICINE | Admitting: INTERNAL MEDICINE
Payer: MEDICARE

## 2020-08-17 DIAGNOSIS — K74.60: ICD-10-CM

## 2020-08-17 DIAGNOSIS — N18.3: ICD-10-CM

## 2020-08-17 DIAGNOSIS — A41.9: Primary | ICD-10-CM

## 2020-08-17 DIAGNOSIS — Z86.73: ICD-10-CM

## 2020-08-17 DIAGNOSIS — Z79.4: ICD-10-CM

## 2020-08-17 DIAGNOSIS — Z80.9: ICD-10-CM

## 2020-08-17 DIAGNOSIS — E78.5: ICD-10-CM

## 2020-08-17 DIAGNOSIS — I13.0: ICD-10-CM

## 2020-08-17 DIAGNOSIS — K76.6: ICD-10-CM

## 2020-08-17 DIAGNOSIS — K81.0: ICD-10-CM

## 2020-08-17 DIAGNOSIS — Z83.3: ICD-10-CM

## 2020-08-17 DIAGNOSIS — I45.2: ICD-10-CM

## 2020-08-17 DIAGNOSIS — I50.32: ICD-10-CM

## 2020-08-17 DIAGNOSIS — D63.1: ICD-10-CM

## 2020-08-17 DIAGNOSIS — K65.2: ICD-10-CM

## 2020-08-17 DIAGNOSIS — F17.200: ICD-10-CM

## 2020-08-17 DIAGNOSIS — N40.0: ICD-10-CM

## 2020-08-17 DIAGNOSIS — I44.0: ICD-10-CM

## 2020-08-17 DIAGNOSIS — I95.9: ICD-10-CM

## 2020-08-17 DIAGNOSIS — Z98.890: ICD-10-CM

## 2020-08-17 DIAGNOSIS — Z20.828: ICD-10-CM

## 2020-08-17 DIAGNOSIS — M19.90: ICD-10-CM

## 2020-08-17 DIAGNOSIS — F32.9: ICD-10-CM

## 2020-08-17 DIAGNOSIS — E11.9: ICD-10-CM

## 2020-08-17 DIAGNOSIS — Z79.82: ICD-10-CM

## 2020-08-17 DIAGNOSIS — I50.22: ICD-10-CM

## 2020-08-17 DIAGNOSIS — Z80.3: ICD-10-CM

## 2020-08-17 DIAGNOSIS — Z79.899: ICD-10-CM

## 2020-08-17 LAB
ALBUMIN SERPL-MCNC: 2.5 G/DL (ref 3.5–5)
ALP SERPL-CCNC: 252 U/L (ref 38–126)
ALT SERPL-CCNC: 14 U/L (ref 4–49)
AMYLASE SERPL-CCNC: 37 U/L (ref 30–110)
ANION GAP SERPL CALC-SCNC: 8 MMOL/L
APTT BLD: 22.2 SEC (ref 22–30)
AST SERPL-CCNC: 21 U/L (ref 17–59)
BASOPHILS # BLD AUTO: 0 K/UL (ref 0–0.2)
BASOPHILS NFR BLD AUTO: 0 %
BUN SERPL-SCNC: 34 MG/DL (ref 9–20)
CALCIUM SPEC-MCNC: 7.9 MG/DL (ref 8.4–10.2)
CHLORIDE SERPL-SCNC: 99 MMOL/L (ref 98–107)
CO2 SERPL-SCNC: 26 MMOL/L (ref 22–30)
EOSINOPHIL # BLD AUTO: 0.1 K/UL (ref 0–0.7)
EOSINOPHIL NFR BLD AUTO: 1 %
ERYTHROCYTE [DISTWIDTH] IN BLOOD BY AUTOMATED COUNT: 3.8 M/UL (ref 4.3–5.9)
ERYTHROCYTE [DISTWIDTH] IN BLOOD: 16.2 % (ref 11.5–15.5)
GLUCOSE BLD-MCNC: 108 MG/DL (ref 75–99)
GLUCOSE BLD-MCNC: 113 MG/DL (ref 75–99)
GLUCOSE BLD-MCNC: 133 MG/DL (ref 75–99)
GLUCOSE BLD-MCNC: 67 MG/DL (ref 75–99)
GLUCOSE SERPL-MCNC: 55 MG/DL (ref 74–99)
HCO3 BLDV-SCNC: 26 MMOL/L (ref 24–28)
HCT VFR BLD AUTO: 31.4 % (ref 39–53)
HGB BLD-MCNC: 9.8 GM/DL (ref 13–17.5)
INR PPP: 1.1 (ref ?–1.2)
LACTATE BLDV-SCNC: 1.4 MMOL/L (ref 0.7–2)
LYMPHOCYTES # SPEC AUTO: 0.2 K/UL (ref 1–4.8)
LYMPHOCYTES NFR SPEC AUTO: 1 %
MCH RBC QN AUTO: 25.9 PG (ref 25–35)
MCHC RBC AUTO-ENTMCNC: 31.3 G/DL (ref 31–37)
MCV RBC AUTO: 82.7 FL (ref 80–100)
MONOCYTES # BLD AUTO: 0.3 K/UL (ref 0–1)
MONOCYTES NFR BLD AUTO: 2 %
NEUTROPHILS # BLD AUTO: 11.2 K/UL (ref 1.3–7.7)
NEUTROPHILS NFR BLD AUTO: 95 %
PCO2 BLDV: 34 MMHG (ref 37–51)
PH BLDV: 7.49 [PH] (ref 7.31–7.41)
PLATELET # BLD AUTO: 235 K/UL (ref 150–450)
POTASSIUM SERPL-SCNC: 3.4 MMOL/L (ref 3.5–5.1)
PROT SERPL-MCNC: 5.7 G/DL (ref 6.3–8.2)
PT BLD: 11 SEC (ref 9–12)
SODIUM SERPL-SCNC: 133 MMOL/L (ref 137–145)
WBC # BLD AUTO: 11.8 K/UL (ref 3.8–10.6)

## 2020-08-17 PROCEDURE — 93005 ELECTROCARDIOGRAM TRACING: CPT

## 2020-08-17 PROCEDURE — 82803 BLOOD GASES ANY COMBINATION: CPT

## 2020-08-17 PROCEDURE — 86900 BLOOD TYPING SEROLOGIC ABO: CPT

## 2020-08-17 PROCEDURE — 85610 PROTHROMBIN TIME: CPT

## 2020-08-17 PROCEDURE — 96365 THER/PROPH/DIAG IV INF INIT: CPT

## 2020-08-17 PROCEDURE — 70450 CT HEAD/BRAIN W/O DYE: CPT

## 2020-08-17 PROCEDURE — 87040 BLOOD CULTURE FOR BACTERIA: CPT

## 2020-08-17 PROCEDURE — 82140 ASSAY OF AMMONIA: CPT

## 2020-08-17 PROCEDURE — 96376 TX/PRO/DX INJ SAME DRUG ADON: CPT

## 2020-08-17 PROCEDURE — 96375 TX/PRO/DX INJ NEW DRUG ADDON: CPT

## 2020-08-17 PROCEDURE — 76705 ECHO EXAM OF ABDOMEN: CPT

## 2020-08-17 PROCEDURE — 82565 ASSAY OF CREATININE: CPT

## 2020-08-17 PROCEDURE — 86901 BLOOD TYPING SEROLOGIC RH(D): CPT

## 2020-08-17 PROCEDURE — 81001 URINALYSIS AUTO W/SCOPE: CPT

## 2020-08-17 PROCEDURE — 96367 TX/PROPH/DG ADDL SEQ IV INF: CPT

## 2020-08-17 PROCEDURE — 85730 THROMBOPLASTIN TIME PARTIAL: CPT

## 2020-08-17 PROCEDURE — 82105 ALPHA-FETOPROTEIN SERUM: CPT

## 2020-08-17 PROCEDURE — 99291 CRITICAL CARE FIRST HOUR: CPT

## 2020-08-17 PROCEDURE — 83690 ASSAY OF LIPASE: CPT

## 2020-08-17 PROCEDURE — 83880 ASSAY OF NATRIURETIC PEPTIDE: CPT

## 2020-08-17 PROCEDURE — 86850 RBC ANTIBODY SCREEN: CPT

## 2020-08-17 PROCEDURE — 36415 COLL VENOUS BLD VENIPUNCTURE: CPT

## 2020-08-17 PROCEDURE — 83605 ASSAY OF LACTIC ACID: CPT

## 2020-08-17 PROCEDURE — 82150 ASSAY OF AMYLASE: CPT

## 2020-08-17 PROCEDURE — 71046 X-RAY EXAM CHEST 2 VIEWS: CPT

## 2020-08-17 PROCEDURE — 96366 THER/PROPH/DIAG IV INF ADDON: CPT

## 2020-08-17 PROCEDURE — 85025 COMPLETE CBC W/AUTO DIFF WBC: CPT

## 2020-08-17 PROCEDURE — 80053 COMPREHEN METABOLIC PANEL: CPT

## 2020-08-17 RX ADMIN — CEFAZOLIN SCH MLS/HR: 330 INJECTION, POWDER, FOR SOLUTION INTRAMUSCULAR; INTRAVENOUS at 17:12

## 2020-08-17 NOTE — CT
EXAMINATION TYPE: CT brain wo con

 

DATE OF EXAM: 8/17/2020

 

COMPARISON: None

 

HISTORY: AMS

 

CT DLP: 1161.4 mGycm

Automated exposure control for dose reduction was used.

 

There is diffuse cerebral cortical atrophy. There is no mass effect nor midline shift. There is no si
gn of intracranial hemorrhage. There is some patchy hypodensity around the frontal horns of the later
al ventricles. Calvarium is intact. Skull base is intact. There is normal aeration of the mastoid sin
uses.

 

IMPRESSION:

Cerebral atrophy and chronic small vessel ischemia. No acute intracranial abnormality.

## 2020-08-17 NOTE — US
EXAMINATION TYPE: US gallbladder

 

DATE OF EXAM: 8/17/2020

 

COMPARISON: US

 

CLINICAL HISTORY: fever/AMS. ABD distention

 

EXAM MEASUREMENTS:

 

Liver Length:  19.9 cm   

Gallbladder Wall:  0.7 cm   

CBD:  0.5 cm

Right Kidney:  14.0 x 5.9 x 5.7 cm

 

**Pt unable to roll LLD, AMS

 

Pancreas:  Obscured by bowel gas

Liver:  Enlarged  

Gallbladder:  Wall thickened

**Evidence for sonographic Hayden's sign:  No

CBD:  wnl 

Right Kidney:  wnl, lower pole gassed out 

 

**Small amount of ascites right flank and near dome of liver**

 

IMPRESSION:

 

There is gallbladder wall thickening. No gallstones seen. This could be a calculus cholecystitis. No 
dilated ducts.

## 2020-08-17 NOTE — XR
EXAMINATION TYPE: XR chest 2V

 

DATE OF EXAM: 8/17/2020

 

COMPARISON: 7/27/2020

 

HISTORY: Fever

 

TECHNIQUE: 2 views

 

FINDINGS: Heart and mediastinum are within normal limits. Lungs are clear. Costophrenic angles are cl
ear. There are no hilar masses. There is no heart failure. Bony thorax is intact. Thoracic aorta is a
theromatous.

 

IMPRESSION: No active cardiopulmonary disease. No change.

## 2020-08-18 LAB
ALBUMIN SERPL-MCNC: 2.1 G/DL (ref 3.5–5)
ALP SERPL-CCNC: 162 U/L (ref 38–126)
ALT SERPL-CCNC: 13 U/L (ref 4–49)
ANION GAP SERPL CALC-SCNC: 6 MMOL/L
AST SERPL-CCNC: 20 U/L (ref 17–59)
BASOPHILS # BLD AUTO: 0 K/UL (ref 0–0.2)
BASOPHILS NFR BLD AUTO: 0 %
BUN SERPL-SCNC: 33 MG/DL (ref 9–20)
CALCIUM SPEC-MCNC: 7.2 MG/DL (ref 8.4–10.2)
CHLORIDE SERPL-SCNC: 103 MMOL/L (ref 98–107)
CO2 SERPL-SCNC: 27 MMOL/L (ref 22–30)
EOSINOPHIL # BLD AUTO: 0.1 K/UL (ref 0–0.7)
EOSINOPHIL NFR BLD AUTO: 2 %
ERYTHROCYTE [DISTWIDTH] IN BLOOD BY AUTOMATED COUNT: 3.25 M/UL (ref 4.3–5.9)
ERYTHROCYTE [DISTWIDTH] IN BLOOD: 15.9 % (ref 11.5–15.5)
GLUCOSE BLD-MCNC: 119 MG/DL (ref 75–99)
GLUCOSE BLD-MCNC: 130 MG/DL (ref 75–99)
GLUCOSE BLD-MCNC: 160 MG/DL (ref 75–99)
GLUCOSE BLD-MCNC: 86 MG/DL (ref 75–99)
GLUCOSE SERPL-MCNC: 79 MG/DL (ref 74–99)
HCT VFR BLD AUTO: 27.6 % (ref 39–53)
HGB BLD-MCNC: 8.7 GM/DL (ref 13–17.5)
LYMPHOCYTES # SPEC AUTO: 0.8 K/UL (ref 1–4.8)
LYMPHOCYTES NFR SPEC AUTO: 15 %
MCH RBC QN AUTO: 26.7 PG (ref 25–35)
MCHC RBC AUTO-ENTMCNC: 31.3 G/DL (ref 31–37)
MCV RBC AUTO: 85.1 FL (ref 80–100)
MONOCYTES # BLD AUTO: 0.4 K/UL (ref 0–1)
MONOCYTES NFR BLD AUTO: 8 %
NEUTROPHILS # BLD AUTO: 3.9 K/UL (ref 1.3–7.7)
NEUTROPHILS NFR BLD AUTO: 73 %
PH UR: 5 [PH] (ref 5–8)
PLATELET # BLD AUTO: 162 K/UL (ref 150–450)
POTASSIUM SERPL-SCNC: 3.6 MMOL/L (ref 3.5–5.1)
PROT SERPL-MCNC: 5.1 G/DL (ref 6.3–8.2)
SODIUM SERPL-SCNC: 136 MMOL/L (ref 137–145)
SP GR UR: 1 (ref 1–1.03)
UROBILINOGEN UR QL STRIP: 4 MG/DL (ref ?–2)
WBC # BLD AUTO: 5.4 K/UL (ref 3.8–10.6)

## 2020-08-18 RX ADMIN — TAMSULOSIN HYDROCHLORIDE SCH MG: 0.4 CAPSULE ORAL at 20:27

## 2020-08-18 RX ADMIN — INSULIN ASPART SCH: 100 INJECTION, SOLUTION INTRAVENOUS; SUBCUTANEOUS at 06:20

## 2020-08-18 RX ADMIN — CEFAZOLIN SCH MLS/HR: 330 INJECTION, POWDER, FOR SOLUTION INTRAMUSCULAR; INTRAVENOUS at 01:16

## 2020-08-18 RX ADMIN — FUROSEMIDE SCH MG: 40 TABLET ORAL at 17:41

## 2020-08-18 RX ADMIN — ASPIRIN SCH: 325 TABLET ORAL at 17:43

## 2020-08-18 RX ADMIN — PIPERACILLIN AND TAZOBACTAM SCH MLS/HR: 3; .375 INJECTION, POWDER, FOR SOLUTION INTRAVENOUS at 01:16

## 2020-08-18 RX ADMIN — Medication SCH: at 17:43

## 2020-08-18 RX ADMIN — PIPERACILLIN AND TAZOBACTAM SCH MLS/HR: 3; .375 INJECTION, POWDER, FOR SOLUTION INTRAVENOUS at 08:55

## 2020-08-18 RX ADMIN — PIPERACILLIN AND TAZOBACTAM SCH MLS/HR: 3; .375 INJECTION, POWDER, FOR SOLUTION INTRAVENOUS at 23:30

## 2020-08-18 RX ADMIN — ISOSORBIDE MONONITRATE SCH MG: 30 TABLET, EXTENDED RELEASE ORAL at 17:41

## 2020-08-18 RX ADMIN — CEFAZOLIN SCH MLS/HR: 330 INJECTION, POWDER, FOR SOLUTION INTRAMUSCULAR; INTRAVENOUS at 08:54

## 2020-08-18 RX ADMIN — THERA TABS SCH: TAB at 17:43

## 2020-08-18 RX ADMIN — INSULIN ASPART SCH UNIT: 100 INJECTION, SOLUTION INTRAVENOUS; SUBCUTANEOUS at 20:26

## 2020-08-18 RX ADMIN — CEFAZOLIN SCH MLS/HR: 330 INJECTION, POWDER, FOR SOLUTION INTRAMUSCULAR; INTRAVENOUS at 23:30

## 2020-08-18 RX ADMIN — INSULIN ASPART SCH: 100 INJECTION, SOLUTION INTRAVENOUS; SUBCUTANEOUS at 17:42

## 2020-08-18 RX ADMIN — INSULIN ASPART SCH: 100 INJECTION, SOLUTION INTRAVENOUS; SUBCUTANEOUS at 13:17

## 2020-08-18 RX ADMIN — CEFAZOLIN SCH MLS/HR: 330 INJECTION, POWDER, FOR SOLUTION INTRAMUSCULAR; INTRAVENOUS at 17:42

## 2020-08-18 RX ADMIN — PIPERACILLIN AND TAZOBACTAM SCH MLS/HR: 3; .375 INJECTION, POWDER, FOR SOLUTION INTRAVENOUS at 17:40

## 2020-08-18 RX ADMIN — SPIRONOLACTONE SCH MG: 25 TABLET, FILM COATED ORAL at 20:26

## 2020-08-18 NOTE — P.HPIM
History of Present Illness


H&P Date: 08/18/20





Luis Acosta, is a 75-year-old male who presented to [facility] emergency room with fever , his temperature was 103 at home , mental 

status changes , and abdominal pain , patient was recently diagnosed with liver 

cirrhosis he had ascites with history of paracentesis, he was admitted with 

fever 3 weeks ago, and diagnosis was presumed as spontaneous bacterial 

peritonitis although cultures were negative, patient improved and was discharged

home he returned to emergency room with similar symptoms.  Patient was evaluated

in the emergency room his temperature was 1 or 2.7 pulse 89 respiration 18 and 

blood pressure 105/49 pulse ox 97% on room air, his white blood count was 11.8 

hemoglobin 9.8 platelet count 235 BUN was elevated at 34 creatinine 1.33 

alkaline phosphatase was elevated at 252 AST and ALT were normal.  Gallbladder 

ultrasound revealed gallbladder wall thickening patient was admitted to medical 

floor, he was started on IV antibiotic Zosyn, surgical consultation was 

requested.


Patient has a known history of diabetes Navin's, history of hypertension, 

history of hyperlipidemia, history of stroke, history of congestive heart 

failure, and history of benign prostatic hypertrophy.


On review of systems patient is alert and oriented in no distress he is denying 

any symptoms at this time there is no fever or chills no headache or dizziness 

no chest pain no shortness of breath no cough no nausea or vomiting no abdominal

pain no diarrhea no burning with urination no frequency or urgency no hematuria





Past Medical History


Past Medical History: Chest Pain / Angina, Heart Failure, CVA/TIA, Diabetes 

Mellitus, Hyperlipidemia, Hypertension, Osteoarthritis (OA)


Additional Past Medical History / Comment(s): incontinent of stools 

intermittently. low iron levels, possible GI bleed-dark tarry stools per son, 

Son stated "has had recent falls related to blood sugar going low 60s"-HX 2017 

and 2018 had episodes of CHF approx 7-10 days post receiving flu 

vaccine,TIA,Type2 IDDM


History of Any Multi-Drug Resistant Organisms: None Reported


Past Surgical History: Hernia Repair


Additional Past Surgical History / Comment(s): 9/11/18 robot assisted 

laprascopic umbilical hernia repair with mesh., 6/10/19 repair recurrent 

incarerated hernia


Past Anesthesia/Blood Transfusion Reactions: No Reported Reaction


Additional Past Anesthesia/Blood Transfusion Reaction / Comment(s): never had 

anesthesia


Past Psychological History: Depression


Additional Psychological History / Comment(s): Pt resides with his spouse.  He 

states he uses no assistive devices.  He no longer drives, his spouse does the 

driving and manages his medications.


Smoking Status: Current every day smoker


Past Alcohol Use History: None Reported


Additional Past Alcohol Use History / Comment(s): started smoking age teen, 

stopped for 25 years and restarted smoking occasionally/lightly-a pack will last

2 weeks


Past Drug Use History: None Reported





- Past Family History


  ** Mother


Family Medical History: Cancer


Additional Family Medical History / Comment(s): breast cancer





  ** Father


Family Medical History: Unable to Obtain





  ** Sister(s)


Family Medical History: Cancer





  ** Brother(s)


Family Medical History: Diabetes Mellitus





Medications and Allergies


                                Home Medications











 Medication  Instructions  Recorded  Confirmed  Type


 


Benazepril HCl 40 mg PO QAM 12/03/17 08/17/20 History


 


Tamsulosin [Flomax] 0.4 mg PO HS 12/04/17 08/17/20 History


 


Isosorbide Mononitrate ER [Imdur] 30 mg PO QAM 09/05/18 08/17/20 History


 


Ferrous Sulfate [Iron (65  mg PO DAILY #30 tab 12/03/18 08/17/20 Rx





Elemental)]    


 


Cyanocobalamin (Vitamin B-12) 5,000 mcg PO DAILY 06/26/20 08/17/20 History





[Vitamin B-12]    


 


Nitroglycerin Sl Tab (0.3mg) 0.3 mg SUBLINGUAL Q5M PRN 06/26/20 08/17/20 History


 


carvediloL [Coreg*] 12.5 mg PO BID-W/MEALS  tab 07/01/20 08/17/20 Rx


 


Furosemide [Lasix] 40 mg PO BID 07/27/20 08/17/20 History


 


Insulin Glargine,Hum.rec.anlog 40 unit SQ QAM 07/27/20 08/17/20 History





[Basaglar Kwikpen U-100]    


 


Multivitamins, Thera [Multivitamin 1 tab PO DAILY 07/27/20 08/17/20 History





(formulary)]    


 


Spironolactone [Aldactone] 50 mg PO BID 07/27/20 08/17/20 History


 


ALPRAZolam [Xanax] 0.25 mg PO BID PRN 08/17/20 08/17/20 History


 


Cholecalciferol [Vitamin D3 (25 1,000 unit PO DAILY 08/17/20 08/17/20 History





Mcg = 1000 Iu)]    


 


Potassium Gluconate 99 mg PO DAILY 08/17/20 08/17/20 History








                                    Allergies











Allergy/AdvReac Type Severity Reaction Status Date / Time


 


No Known Allergies Allergy   Verified 08/17/20 18:58














Physical Exam


Vitals: 


                                   Vital Signs











  Temp Pulse Pulse Resp BP BP Pulse Ox


 


 08/18/20 04:00  98.8 F   62  20   101/57  96


 


 08/18/20 00:00  98.8 F   70  18   108/60  95


 


 08/17/20 20:27   78   16  110/58   93 L


 


 08/17/20 19:17  98.5 F   66  18   105/66  95


 


 08/17/20 19:14  98.2 F  78   18  89/44   92 L


 


 08/17/20 19:01   82   18  95/49   96


 


 08/17/20 18:52      93/58  


 


 08/17/20 16:19  102.7 F H  89   18  105/49   97








                                Intake and Output











 08/17/20 08/18/20 08/18/20





 22:59 06:59 14:59


 


Intake Total   0


 


Output Total  250 300


 


Balance  -250 -300


 


Intake:   


 


  Oral   0


 


Output:   


 


  Urine  250 300


 


Other:   


 


  Voiding Method  Toilet 





  Diaper 


 


  # Voids 1 1 


 


  # Bowel Movements 1 1 1


 


  Weight 100.244 kg 97 kg 














In general patient is alert and oriented in no apparent distress


HEENT head normocephalic and atraumatic


Neck is supple no JVD no goiter no lymphadenopathy


Chest exam reveals a few scattered rhonchi no wheezing


Cardiac exam reveals regular heart sounds no gallops no murmurs


Abdomen is soft nontender no organomegaly


Extremity exam reveals no edema no cyanosis or clubbing


Neurological examination reveals no gross focal deficits





Results


CBC & Chem 7: 


                                 08/18/20 08:42





                                 08/18/20 08:42


Labs: 


                  Abnormal Lab Results - Last 24 Hours (Table)











  08/17/20 08/17/20 08/17/20 Range/Units





  16:50 16:50 16:50 


 


WBC  11.8 H    (3.8-10.6)  k/uL


 


RBC  3.80 L    (4.30-5.90)  m/uL


 


Hgb  9.8 L    (13.0-17.5)  gm/dL


 


Hct  31.4 L    (39.0-53.0)  %


 


RDW  16.2 H    (11.5-15.5)  %


 


Neutrophils #  11.2 H    (1.3-7.7)  k/uL


 


Lymphocytes #  0.2 L    (1.0-4.8)  k/uL


 


VBG pH    7.49 H  (7.31-7.41)  


 


VBG pCO2    34 L  (37-51)  mmHg


 


Sodium   133 L   (137-145)  mmol/L


 


Potassium   3.4 L   (3.5-5.1)  mmol/L


 


BUN   34 H   (9-20)  mg/dL


 


Creatinine   1.33 H   (0.66-1.25)  mg/dL


 


Glucose   55 L   (74-99)  mg/dL


 


POC Glucose (mg/dL)     (75-99)  mg/dL


 


Calcium   7.9 L   (8.4-10.2)  mg/dL


 


Alkaline Phosphatase   252 H   ()  U/L


 


Total Protein   5.7 L   (6.3-8.2)  g/dL


 


Albumin   2.5 L   (3.5-5.0)  g/dL














  08/17/20 08/17/20 08/17/20 Range/Units





  18:34 19:20 19:58 


 


WBC     (3.8-10.6)  k/uL


 


RBC     (4.30-5.90)  m/uL


 


Hgb     (13.0-17.5)  gm/dL


 


Hct     (39.0-53.0)  %


 


RDW     (11.5-15.5)  %


 


Neutrophils #     (1.3-7.7)  k/uL


 


Lymphocytes #     (1.0-4.8)  k/uL


 


VBG pH     (7.31-7.41)  


 


VBG pCO2     (37-51)  mmHg


 


Sodium     (137-145)  mmol/L


 


Potassium     (3.5-5.1)  mmol/L


 


BUN     (9-20)  mg/dL


 


Creatinine     (0.66-1.25)  mg/dL


 


Glucose     (74-99)  mg/dL


 


POC Glucose (mg/dL)  108 H  67 L  133 H  (75-99)  mg/dL


 


Calcium     (8.4-10.2)  mg/dL


 


Alkaline Phosphatase     ()  U/L


 


Total Protein     (6.3-8.2)  g/dL


 


Albumin     (3.5-5.0)  g/dL














  08/17/20 08/18/20 08/18/20 Range/Units





  20:46 06:14 08:42 


 


WBC     (3.8-10.6)  k/uL


 


RBC    3.25 L  (4.30-5.90)  m/uL


 


Hgb    8.7 L  (13.0-17.5)  gm/dL


 


Hct    27.6 L  (39.0-53.0)  %


 


RDW    15.9 H  (11.5-15.5)  %


 


Neutrophils #     (1.3-7.7)  k/uL


 


Lymphocytes #    0.8 L  (1.0-4.8)  k/uL


 


VBG pH     (7.31-7.41)  


 


VBG pCO2     (37-51)  mmHg


 


Sodium     (137-145)  mmol/L


 


Potassium     (3.5-5.1)  mmol/L


 


BUN     (9-20)  mg/dL


 


Creatinine     (0.66-1.25)  mg/dL


 


Glucose     (74-99)  mg/dL


 


POC Glucose (mg/dL)  113 H  119 H   (75-99)  mg/dL


 


Calcium     (8.4-10.2)  mg/dL


 


Alkaline Phosphatase     ()  U/L


 


Total Protein     (6.3-8.2)  g/dL


 


Albumin     (3.5-5.0)  g/dL














  08/18/20 Range/Units





  08:42 


 


WBC   (3.8-10.6)  k/uL


 


RBC   (4.30-5.90)  m/uL


 


Hgb   (13.0-17.5)  gm/dL


 


Hct   (39.0-53.0)  %


 


RDW   (11.5-15.5)  %


 


Neutrophils #   (1.3-7.7)  k/uL


 


Lymphocytes #   (1.0-4.8)  k/uL


 


VBG pH   (7.31-7.41)  


 


VBG pCO2   (37-51)  mmHg


 


Sodium  136 L  (137-145)  mmol/L


 


Potassium   (3.5-5.1)  mmol/L


 


BUN  33 H  (9-20)  mg/dL


 


Creatinine  1.42 H  (0.66-1.25)  mg/dL


 


Glucose   (74-99)  mg/dL


 


POC Glucose (mg/dL)   (75-99)  mg/dL


 


Calcium  7.2 L  (8.4-10.2)  mg/dL


 


Alkaline Phosphatase  162 H  ()  U/L


 


Total Protein  5.1 L  (6.3-8.2)  g/dL


 


Albumin  2.1 L  (3.5-5.0)  g/dL














Thrombosis Risk Factor Assmnt





- Choose All That Apply


Each Factor Represents 1 point: Sepsis (< 1month), Swollen legs (current)


Each Risk Factor Represents 3 Points: Age 75 years or older


Other congenital or acquired thrombophilia - If yes, enter type in comment: No


Thrombosis Risk Factor Assessment Total Risk Factor Score: 5


Thrombosis Risk Factor Assessment Level: High Risk





Assessment and Plan


Plan: 








1. Acalculous cholecystitis, surgical consultation requested


2.  Underlying history of liver cirrhosis, with history of Acites, history of 

paracentesis and history of spontaneous bacterial peritonitis


3. Underlying history of diabetes mellitus


4. Underlying history of hypertension


5. Underlying history of hyperlipidemia


6. Previous history of stroke


7. Previous history of congestive heart failure, stable at this time.





Patient was started on IV Zosyn surgical consultation, gastroenterology 

consultation and infectious disease consultation were requested

## 2020-08-18 NOTE — P.GSCN
History of Present Illness


Consult date: 08/18/20


History of present illness: 





CHIEF COMPLAINT: Abdominal pain





HISTORY OF PRESENT ILLNESS: This is a 75-year-old male with a known history of 

diabetes, CVA, CHF, hypertension, hyperlipidemia, umbilical hernia repair.  He 

also was admitted hospital 3 weeks ago for possible bacterial peritonitis. Fluid

cultures were negative.  Patient presents to the emergency room with altered 

mental status and fever.  He is also complaining of abdominal pain.  He was 

febrile with a temp of 102.7 hypotensive and white count elevated at 11.8.  

Patient was started on IV antibiotics and IV fluids in the ER.  We've been 

consulted for the abdominal pain and abnormal gallbladder ultrasound. 





PAST MEDICAL HISTORY: 


See list.





PAST SURGICAL HISTORY: 


See list.





MEDICATIONS: 


See list.





ALLERGIES: 


See list.





SOCIAL HISTORY: No illicit drug use.  





REVIEW OF SYSTEMS: 


CONSTITUTIONAL: Denies fever or chills.


HEENT: Denies blurred vision, vision changes, or eye pain. Denies hemoptysis 


CARDIOVASCULAR: Denies chest pain or pressure.


RESPIRATORY: No shortness of breath. 


GASTROINTESTINAL: See HPI for pertinent findings


HEMATOLOGIC: Denies bleeding disorders.


GENITOURINARY:  Denies any blood in urine or increased urinary frequency.  


SKIN: Denies pruitis. Denies rash.





PHYSICAL EXAM: 


VITAL SIGNS: Reviewed


GENERAL: Well-developed in no acute distress. 


HEENT:  No sclera icterus. Extraocular movements grossly intact.  Moist buccal 

mucosa. Head is atraumatic, normocephalic. No nasal drainage.


ABDOMEN:  Soft.  Right upper quadrant Palpation


NEUROLOGIC:  Awake and alert.  Slightly confused





LABORATORY DATA:


WBC 11.8 hemoglobin 9.8 creatinine 1.4 to AST 20 ALT 13, alk phos 252 lipase 94.

 UA negative





IMAGING:


Abdominal ultrasound.  Gallbladder wall thickening.  No gallstones seen.  This 

could be acalculous cholecystitis.  No dilated ducts





ASSESSMENT: 


1.  Acalculous cholecystitis with sepsis





PLAN: 


-Patient is scheduled for laparoscopic cholecystectomy with Dr. Gaitan today


-Continue IV antibiotics


-Continue IV fluids











Physician Assistant note has been reviewed by physician. Signing provider agrees

with the documented findings, assessment, and plan of care. 





Past Medical History


Past Medical History: Chest Pain / Angina, Heart Failure, CVA/TIA, Diabetes 

Mellitus, Hyperlipidemia, Hypertension, Osteoarthritis (OA)


Additional Past Medical History / Comment(s): incontinent of stools 

intermittently. low iron levels, possible GI bleed-dark tarry stools per son, 

Son stated "has had recent falls related to blood sugar going low 60s"-HX 2017 

and 2018 had episodes of CHF approx 7-10 days post receiving flu 

vaccine,TIA,Type2 IDDM


History of Any Multi-Drug Resistant Organisms: None Reported


Past Surgical History: Hernia Repair


Additional Past Surgical History / Comment(s): 9/11/18 robot assisted 

laprascopic umbilical hernia repair with mesh., 6/10/19 repair recurrent 

incarerated hernia


Past Anesthesia/Blood Transfusion Reactions: No Reported Reaction


Additional Past Anesthesia/Blood Transfusion Reaction / Comm: never had 

anesthesia


Past Psychological History: Depression


Additional Psychological History / Comment(s): Pt resides with his spouse.  He 

states he uses no assistive devices.  He no longer drives, his spouse does the 

driving and manages his medications.


Smoking Status: Current every day smoker


Past Alcohol Use History: None Reported


Additional Past Alcohol Use History / Comment(s): started smoking age teen, 

stopped for 25 years and restarted smoking occasionally/lightly-a pack will last

2 weeks


Past Drug Use History: None Reported





- Past Family History


  ** Mother


Family Medical History: Cancer


Additional Family Medical History / Comment(s): breast cancer





  ** Father


Family Medical History: Unable to Obtain





  ** Sister(s)


Family Medical History: Cancer





  ** Brother(s)


Family Medical History: Diabetes Mellitus





Medications and Allergies


                                Home Medications











 Medication  Instructions  Recorded  Confirmed  Type


 


Benazepril HCl 40 mg PO QAM 12/03/17 08/17/20 History


 


Tamsulosin [Flomax] 0.4 mg PO HS 12/04/17 08/17/20 History


 


Isosorbide Mononitrate ER [Imdur] 30 mg PO QAM 09/05/18 08/17/20 History


 


Ferrous Sulfate [Iron (65  mg PO DAILY #30 tab 12/03/18 08/17/20 Rx





Elemental)]    


 


Cyanocobalamin (Vitamin B-12) 5,000 mcg PO DAILY 06/26/20 08/17/20 History





[Vitamin B-12]    


 


Nitroglycerin Sl Tab (0.3mg) 0.3 mg SUBLINGUAL Q5M PRN 06/26/20 08/17/20 History


 


carvediloL [Coreg*] 12.5 mg PO BID-W/MEALS  tab 07/01/20 08/17/20 Rx


 


Furosemide [Lasix] 40 mg PO BID 07/27/20 08/17/20 History


 


Insulin Glargine,Hum.rec.anlog 40 unit SQ QAM 07/27/20 08/17/20 History





[Basaglaleksander Camargopen U-100]    


 


Multivitamins, Thera [Multivitamin 1 tab PO DAILY 07/27/20 08/17/20 History





(formulary)]    


 


Spironolactone [Aldactone] 50 mg PO BID 07/27/20 08/17/20 History


 


ALPRAZolam [Xanax] 0.25 mg PO BID PRN 08/17/20 08/17/20 History


 


Cholecalciferol [Vitamin D3 (25 1,000 unit PO DAILY 08/17/20 08/17/20 History





Mcg = 1000 Iu)]    


 


Potassium Gluconate 99 mg PO DAILY 08/17/20 08/17/20 History








                                    Allergies











Allergy/AdvReac Type Severity Reaction Status Date / Time


 


No Known Allergies Allergy   Verified 08/17/20 18:58














Surgical - Exam


                                   Vital Signs











Temp Pulse Resp BP Pulse Ox


 


 102.7 F H  89   18   105/49   97 


 


 08/17/20 16:19  08/17/20 16:19  08/17/20 16:19  08/17/20 16:19  08/17/20 16:19














Results





- Labs





                                 08/18/20 08:42





                                 08/18/20 08:42


                  Abnormal Lab Results - Last 24 Hours (Table)











  08/17/20 08/17/20 08/17/20 Range/Units





  16:50 16:50 16:50 


 


WBC  11.8 H    (3.8-10.6)  k/uL


 


RBC  3.80 L    (4.30-5.90)  m/uL


 


Hgb  9.8 L    (13.0-17.5)  gm/dL


 


Hct  31.4 L    (39.0-53.0)  %


 


RDW  16.2 H    (11.5-15.5)  %


 


Neutrophils #  11.2 H    (1.3-7.7)  k/uL


 


Lymphocytes #  0.2 L    (1.0-4.8)  k/uL


 


VBG pH    7.49 H  (7.31-7.41)  


 


VBG pCO2    34 L  (37-51)  mmHg


 


Sodium   133 L   (137-145)  mmol/L


 


Potassium   3.4 L   (3.5-5.1)  mmol/L


 


BUN   34 H   (9-20)  mg/dL


 


Creatinine   1.33 H   (0.66-1.25)  mg/dL


 


Glucose   55 L   (74-99)  mg/dL


 


POC Glucose (mg/dL)     (75-99)  mg/dL


 


Calcium   7.9 L   (8.4-10.2)  mg/dL


 


Alkaline Phosphatase   252 H   ()  U/L


 


Total Protein   5.7 L   (6.3-8.2)  g/dL


 


Albumin   2.5 L   (3.5-5.0)  g/dL














  08/17/20 08/17/20 08/17/20 Range/Units





  18:34 19:20 19:58 


 


WBC     (3.8-10.6)  k/uL


 


RBC     (4.30-5.90)  m/uL


 


Hgb     (13.0-17.5)  gm/dL


 


Hct     (39.0-53.0)  %


 


RDW     (11.5-15.5)  %


 


Neutrophils #     (1.3-7.7)  k/uL


 


Lymphocytes #     (1.0-4.8)  k/uL


 


VBG pH     (7.31-7.41)  


 


VBG pCO2     (37-51)  mmHg


 


Sodium     (137-145)  mmol/L


 


Potassium     (3.5-5.1)  mmol/L


 


BUN     (9-20)  mg/dL


 


Creatinine     (0.66-1.25)  mg/dL


 


Glucose     (74-99)  mg/dL


 


POC Glucose (mg/dL)  108 H  67 L  133 H  (75-99)  mg/dL


 


Calcium     (8.4-10.2)  mg/dL


 


Alkaline Phosphatase     ()  U/L


 


Total Protein     (6.3-8.2)  g/dL


 


Albumin     (3.5-5.0)  g/dL














  08/17/20 08/18/20 08/18/20 Range/Units





  20:46 06:14 08:42 


 


WBC     (3.8-10.6)  k/uL


 


RBC    3.25 L  (4.30-5.90)  m/uL


 


Hgb    8.7 L  (13.0-17.5)  gm/dL


 


Hct    27.6 L  (39.0-53.0)  %


 


RDW    15.9 H  (11.5-15.5)  %


 


Neutrophils #     (1.3-7.7)  k/uL


 


Lymphocytes #    0.8 L  (1.0-4.8)  k/uL


 


VBG pH     (7.31-7.41)  


 


VBG pCO2     (37-51)  mmHg


 


Sodium     (137-145)  mmol/L


 


Potassium     (3.5-5.1)  mmol/L


 


BUN     (9-20)  mg/dL


 


Creatinine     (0.66-1.25)  mg/dL


 


Glucose     (74-99)  mg/dL


 


POC Glucose (mg/dL)  113 H  119 H   (75-99)  mg/dL


 


Calcium     (8.4-10.2)  mg/dL


 


Alkaline Phosphatase     ()  U/L


 


Total Protein     (6.3-8.2)  g/dL


 


Albumin     (3.5-5.0)  g/dL














  08/18/20 Range/Units





  08:42 


 


WBC   (3.8-10.6)  k/uL


 


RBC   (4.30-5.90)  m/uL


 


Hgb   (13.0-17.5)  gm/dL


 


Hct   (39.0-53.0)  %


 


RDW   (11.5-15.5)  %


 


Neutrophils #   (1.3-7.7)  k/uL


 


Lymphocytes #   (1.0-4.8)  k/uL


 


VBG pH   (7.31-7.41)  


 


VBG pCO2   (37-51)  mmHg


 


Sodium  136 L  (137-145)  mmol/L


 


Potassium   (3.5-5.1)  mmol/L


 


BUN  33 H  (9-20)  mg/dL


 


Creatinine  1.42 H  (0.66-1.25)  mg/dL


 


Glucose   (74-99)  mg/dL


 


POC Glucose (mg/dL)   (75-99)  mg/dL


 


Calcium  7.2 L  (8.4-10.2)  mg/dL


 


Alkaline Phosphatase  162 H  ()  U/L


 


Total Protein  5.1 L  (6.3-8.2)  g/dL


 


Albumin  2.1 L  (3.5-5.0)  g/dL








                                 Diabetes panel











  08/17/20 08/18/20 Range/Units





  16:50 08:42 


 


Sodium  133 L  136 L  (137-145)  mmol/L


 


Potassium  3.4 L  3.6  (3.5-5.1)  mmol/L


 


Chloride  99  103  ()  mmol/L


 


Carbon Dioxide  26  27  (22-30)  mmol/L


 


BUN  34 H  33 H  (9-20)  mg/dL


 


Creatinine  1.33 H  1.42 H  (0.66-1.25)  mg/dL


 


Glucose  55 L  79  (74-99)  mg/dL


 


Calcium  7.9 L  7.2 L  (8.4-10.2)  mg/dL


 


AST  21  20  (17-59)  U/L


 


ALT  14  13  (4-49)  U/L


 


Alkaline Phosphatase  252 H  162 H  ()  U/L


 


Total Protein  5.7 L  5.1 L  (6.3-8.2)  g/dL


 


Albumin  2.5 L  2.1 L  (3.5-5.0)  g/dL








                                  Calcium panel











  08/17/20 08/18/20 Range/Units





  16:50 08:42 


 


Calcium  7.9 L  7.2 L  (8.4-10.2)  mg/dL


 


Albumin  2.5 L  2.1 L  (3.5-5.0)  g/dL








                                 Pituitary panel











  08/17/20 08/18/20 Range/Units





  16:50 08:42 


 


Sodium  133 L  136 L  (137-145)  mmol/L


 


Potassium  3.4 L  3.6  (3.5-5.1)  mmol/L


 


Chloride  99  103  ()  mmol/L


 


Carbon Dioxide  26  27  (22-30)  mmol/L


 


BUN  34 H  33 H  (9-20)  mg/dL


 


Creatinine  1.33 H  1.42 H  (0.66-1.25)  mg/dL


 


Glucose  55 L  79  (74-99)  mg/dL


 


Calcium  7.9 L  7.2 L  (8.4-10.2)  mg/dL








                                  Adrenal panel











  08/17/20 08/18/20 Range/Units





  16:50 08:42 


 


Sodium  133 L  136 L  (137-145)  mmol/L


 


Potassium  3.4 L  3.6  (3.5-5.1)  mmol/L


 


Chloride  99  103  ()  mmol/L


 


Carbon Dioxide  26  27  (22-30)  mmol/L


 


BUN  34 H  33 H  (9-20)  mg/dL


 


Creatinine  1.33 H  1.42 H  (0.66-1.25)  mg/dL


 


Glucose  55 L  79  (74-99)  mg/dL


 


Calcium  7.9 L  7.2 L  (8.4-10.2)  mg/dL


 


Total Bilirubin  0.8  0.5  (0.2-1.3)  mg/dL


 


AST  21  20  (17-59)  U/L


 


ALT  14  13  (4-49)  U/L


 


Alkaline Phosphatase  252 H  162 H  ()  U/L


 


Total Protein  5.7 L  5.1 L  (6.3-8.2)  g/dL


 


Albumin  2.5 L  2.1 L  (3.5-5.0)  g/dL

## 2020-08-18 NOTE — P.CONS
History of Present Illness





- Reason for Consult


Consult date: 08/18/20


Sepsis


Requesting physician: Marlin Tafoya





- Chief Complaint


Fever and mental status changes x one day





- History of Present Illness


Patient is 75-year-old male was brought into the ER at [facility] yesterday for evaluation of fever and mental status changes that 

apparently started the day he was presented to the hospital patient apparently 

did have a fever of 102F he did have elevated white count of 11.1 thousand 

patient chest x-ray was negative for any acute infiltrate was negative patient 

did have ultrasound of the gallbladder that was suggestive of acute or chronic 

cholecystitis patient was started on vancomycin and Zosyn and she was admitted 

to the hospital infectious disease was consulted for further management of 

antibiotic therapy and concern for sepsis


On today's induration that is 08/18/2020 the patient is afebrile, the patient 

denies having any chest pain or shortness of breath or cough the patient did 

have some vague pain in the right upper quadrant area however he is unable to 

quantify it any further denies any nausea no vomiting no diarrhea and no urinary

symptoms








Review of Systems


Positive point has been  mentioned in the HPI rest of the systems are negative








Past Medical History


Past Medical History: Chest Pain / Angina, Heart Failure, CVA/TIA, Diabetes 

Mellitus, Hyperlipidemia, Hypertension, Osteoarthritis (OA)


Additional Past Medical History / Comment(s): incontinent of stools 

intermittently. low iron levels, possible GI bleed-dark tarry stools per son, 

Son stated "has had recent falls related to blood sugar going low 60s"-HX 2017 

and 2018 had episodes of CHF approx 7-10 days post receiving flu 

vaccine,TIA,Type2 IDDM


History of Any Multi-Drug Resistant Organisms: None Reported


Past Surgical History: Hernia Repair


Additional Past Surgical History / Comment(s): 9/11/18 robot assisted 

laprascopic umbilical hernia repair with mesh., 6/10/19 repair recurrent 

incarerated hernia


Past Anesthesia/Blood Transfusion Reactions: No Reported Reaction


Additional Past Anesthesia/Blood Transfusion Reaction / Comm: never had 

anesthesia


Past Psychological History: Depression


Additional Psychological History / Comment(s): Pt resides with his spouse.  He 

states he uses no assistive devices.  He no longer drives, his spouse does the 

driving and manages his medications.


Smoking Status: Current every day smoker


Past Alcohol Use History: None Reported


Additional Past Alcohol Use History / Comment(s): started smoking age teen, 

stopped for 25 years and restarted smoking occasionally/lightly-a pack will last

2 weeks


Past Drug Use History: None Reported





- Past Family History


  ** Mother


Family Medical History: Cancer


Additional Family Medical History / Comment(s): breast cancer





  ** Father


Family Medical History: Unable to Obtain





  ** Sister(s)


Family Medical History: Cancer





  ** Brother(s)


Family Medical History: Diabetes Mellitus





Medications and Allergies


                                Home Medications











 Medication  Instructions  Recorded  Confirmed  Type


 


Benazepril HCl 40 mg PO QAM 12/03/17 08/17/20 History


 


Tamsulosin [Flomax] 0.4 mg PO HS 12/04/17 08/17/20 History


 


Isosorbide Mononitrate ER [Imdur] 30 mg PO QAM 09/05/18 08/17/20 History


 


Ferrous Sulfate [Iron (65  mg PO DAILY #30 tab 12/03/18 08/17/20 Rx





Elemental)]    


 


Cyanocobalamin (Vitamin B-12) 5,000 mcg PO DAILY 06/26/20 08/17/20 History





[Vitamin B-12]    


 


Nitroglycerin Sl Tab (0.3mg) 0.3 mg SUBLINGUAL Q5M PRN 06/26/20 08/17/20 History


 


carvediloL [Coreg*] 12.5 mg PO BID-W/MEALS  tab 07/01/20 08/17/20 Rx


 


Furosemide [Lasix] 40 mg PO BID 07/27/20 08/17/20 History


 


Insulin Glargine,Hum.rec.anlog 40 unit SQ QAM 07/27/20 08/17/20 History





[Basaglar Kwikpen U-100]    


 


Multivitamins, Thera [Multivitamin 1 tab PO DAILY 07/27/20 08/17/20 History





(formulary)]    


 


Spironolactone [Aldactone] 50 mg PO BID 07/27/20 08/17/20 History


 


ALPRAZolam [Xanax] 0.25 mg PO BID PRN 08/17/20 08/17/20 History


 


Cholecalciferol [Vitamin D3 (25 1,000 unit PO DAILY 08/17/20 08/17/20 History





Mcg = 1000 Iu)]    


 


Potassium Gluconate 99 mg PO DAILY 08/17/20 08/17/20 History








                                    Allergies











Allergy/AdvReac Type Severity Reaction Status Date / Time


 


No Known Allergies Allergy   Verified 08/17/20 18:58














Physical Exam


Vitals: 


                                   Vital Signs











  Temp Pulse Pulse Resp BP BP Pulse Ox


 


 08/18/20 08:00  97.7 F   58 L    115/56  94 L


 


 08/18/20 04:00  98.8 F   62  20   101/57  96


 


 08/18/20 00:00  98.8 F   70  18   108/60  95


 


 08/17/20 20:27   78   16  110/58   93 L


 


 08/17/20 19:17  98.5 F   66  18   105/66  95


 


 08/17/20 19:14  98.2 F  78   18  89/44   92 L


 


 08/17/20 19:01   82   18  95/49   96


 


 08/17/20 18:52      93/58  


 


 08/17/20 16:19  102.7 F H  89   18  105/49   97








                                Intake and Output











 08/17/20 08/18/20 08/18/20





 22:59 06:59 14:59


 


Intake Total   0


 


Output Total  250 450


 


Balance  -250 -450


 


Intake:   


 


  Oral   0


 


Output:   


 


  Urine  250 450


 


Other:   


 


  Voiding Method  Toilet Toilet





  Diaper Diaper


 


  # Voids 1 1 


 


  # Bowel Movements 1 1 1


 


  Weight 100.244 kg 97 kg 











GENERAL DESCRIPTION: An elderly male up in the chair , no distress. No tachypnea

or accessory muscle of respiration use.


HEENT: Shows Pallor , no scleral icterus. Oral mucous membrane is dry. No 

pharyngeal erythema or thrush


NECK: Trachea central, no thyromegaly.


LUNGS: Unlabored breathing. Clear to auscultation anteriorly. No wheeze or 

crackle.


HEART: S1, S2, regular rate and rhythm. No loud murmur


ABDOMEN: Soft, mild right upper quadrant tenderness , no guarding or rigidity, 

no organomegaly


EXTREMITIES: No edema of feet.


SKIN: No rash, no masses palpable.


NEUROLOGICAL: The patient is awake, alert, oriented x3, mood and affect normal.

















Results


CBC & Chem 7: 


                                 08/18/20 08:42





                                 08/18/20 08:42


Labs: 


                  Abnormal Lab Results - Last 24 Hours (Table)











  08/17/20 08/17/20 08/17/20 Range/Units





  16:50 16:50 16:50 


 


WBC  11.8 H    (3.8-10.6)  k/uL


 


RBC  3.80 L    (4.30-5.90)  m/uL


 


Hgb  9.8 L    (13.0-17.5)  gm/dL


 


Hct  31.4 L    (39.0-53.0)  %


 


RDW  16.2 H    (11.5-15.5)  %


 


Neutrophils #  11.2 H    (1.3-7.7)  k/uL


 


Lymphocytes #  0.2 L    (1.0-4.8)  k/uL


 


VBG pH    7.49 H  (7.31-7.41)  


 


VBG pCO2    34 L  (37-51)  mmHg


 


Sodium   133 L   (137-145)  mmol/L


 


Potassium   3.4 L   (3.5-5.1)  mmol/L


 


BUN   34 H   (9-20)  mg/dL


 


Creatinine   1.33 H   (0.66-1.25)  mg/dL


 


Glucose   55 L   (74-99)  mg/dL


 


POC Glucose (mg/dL)     (75-99)  mg/dL


 


Calcium   7.9 L   (8.4-10.2)  mg/dL


 


Alkaline Phosphatase   252 H   ()  U/L


 


Total Protein   5.7 L   (6.3-8.2)  g/dL


 


Albumin   2.5 L   (3.5-5.0)  g/dL














  08/17/20 08/17/20 08/17/20 Range/Units





  18:34 19:20 19:58 


 


WBC     (3.8-10.6)  k/uL


 


RBC     (4.30-5.90)  m/uL


 


Hgb     (13.0-17.5)  gm/dL


 


Hct     (39.0-53.0)  %


 


RDW     (11.5-15.5)  %


 


Neutrophils #     (1.3-7.7)  k/uL


 


Lymphocytes #     (1.0-4.8)  k/uL


 


VBG pH     (7.31-7.41)  


 


VBG pCO2     (37-51)  mmHg


 


Sodium     (137-145)  mmol/L


 


Potassium     (3.5-5.1)  mmol/L


 


BUN     (9-20)  mg/dL


 


Creatinine     (0.66-1.25)  mg/dL


 


Glucose     (74-99)  mg/dL


 


POC Glucose (mg/dL)  108 H  67 L  133 H  (75-99)  mg/dL


 


Calcium     (8.4-10.2)  mg/dL


 


Alkaline Phosphatase     ()  U/L


 


Total Protein     (6.3-8.2)  g/dL


 


Albumin     (3.5-5.0)  g/dL














  08/17/20 08/18/20 08/18/20 Range/Units





  20:46 06:14 08:42 


 


WBC     (3.8-10.6)  k/uL


 


RBC    3.25 L  (4.30-5.90)  m/uL


 


Hgb    8.7 L  (13.0-17.5)  gm/dL


 


Hct    27.6 L  (39.0-53.0)  %


 


RDW    15.9 H  (11.5-15.5)  %


 


Neutrophils #     (1.3-7.7)  k/uL


 


Lymphocytes #    0.8 L  (1.0-4.8)  k/uL


 


VBG pH     (7.31-7.41)  


 


VBG pCO2     (37-51)  mmHg


 


Sodium     (137-145)  mmol/L


 


Potassium     (3.5-5.1)  mmol/L


 


BUN     (9-20)  mg/dL


 


Creatinine     (0.66-1.25)  mg/dL


 


Glucose     (74-99)  mg/dL


 


POC Glucose (mg/dL)  113 H  119 H   (75-99)  mg/dL


 


Calcium     (8.4-10.2)  mg/dL


 


Alkaline Phosphatase     ()  U/L


 


Total Protein     (6.3-8.2)  g/dL


 


Albumin     (3.5-5.0)  g/dL














  08/18/20 Range/Units





  08:42 


 


WBC   (3.8-10.6)  k/uL


 


RBC   (4.30-5.90)  m/uL


 


Hgb   (13.0-17.5)  gm/dL


 


Hct   (39.0-53.0)  %


 


RDW   (11.5-15.5)  %


 


Neutrophils #   (1.3-7.7)  k/uL


 


Lymphocytes #   (1.0-4.8)  k/uL


 


VBG pH   (7.31-7.41)  


 


VBG pCO2   (37-51)  mmHg


 


Sodium  136 L  (137-145)  mmol/L


 


Potassium   (3.5-5.1)  mmol/L


 


BUN  33 H  (9-20)  mg/dL


 


Creatinine  1.42 H  (0.66-1.25)  mg/dL


 


Glucose   (74-99)  mg/dL


 


POC Glucose (mg/dL)   (75-99)  mg/dL


 


Calcium  7.2 L  (8.4-10.2)  mg/dL


 


Alkaline Phosphatase  162 H  ()  U/L


 


Total Protein  5.1 L  (6.3-8.2)  g/dL


 


Albumin  2.1 L  (3.5-5.0)  g/dL














Assessment and Plan


Assessment: 


1- patient presented to the hospital with sepsis in this patient who did have a 

fever and elevated white count source is likely acute cholecystitis and will 

need to cover for the gram-negative enteric pathogen to the likely organism 

responsible for this sepsis and less likely gram-positive pathogen





(1) Acute cholecystitis


Current Visit: Yes   Status: Acute   Code(s): K81.0 - ACUTE CHOLECYSTITIS   

SNOMED Code(s): 60575914


   





(2) Sepsis


Current Visit: Yes   Status: Acute   Code(s): A41.9 - SEPSIS, UNSPECIFIED 

ORGANISM   SNOMED Code(s): 44238425


   


Plan: 


1-Zosyn 3.375 g every 8 hours


2- surgeryon the case for possible cholecystectomy


3-discontinue vancomycin


We will follow on clinical condition and cultures to further adjust medication 

if needed


Thank you for this consultation will follow this patient with you





Time with Patient: Greater than 30

## 2020-08-19 LAB
ALBUMIN SERPL-MCNC: 2.4 G/DL (ref 3.5–5)
ALP SERPL-CCNC: 165 U/L (ref 38–126)
ALT SERPL-CCNC: 13 U/L (ref 4–49)
ANION GAP SERPL CALC-SCNC: 6 MMOL/L
AST SERPL-CCNC: 17 U/L (ref 17–59)
BASOPHILS # BLD AUTO: 0 K/UL (ref 0–0.2)
BASOPHILS NFR BLD AUTO: 1 %
BUN SERPL-SCNC: 31 MG/DL (ref 9–20)
CALCIUM SPEC-MCNC: 7.4 MG/DL (ref 8.4–10.2)
CHLORIDE SERPL-SCNC: 107 MMOL/L (ref 98–107)
CO2 SERPL-SCNC: 25 MMOL/L (ref 22–30)
EOSINOPHIL # BLD AUTO: 0.2 K/UL (ref 0–0.7)
EOSINOPHIL NFR BLD AUTO: 4 %
ERYTHROCYTE [DISTWIDTH] IN BLOOD BY AUTOMATED COUNT: 3.35 M/UL (ref 4.3–5.9)
ERYTHROCYTE [DISTWIDTH] IN BLOOD: 16 % (ref 11.5–15.5)
GLUCOSE BLD-MCNC: 143 MG/DL (ref 75–99)
GLUCOSE BLD-MCNC: 147 MG/DL (ref 75–99)
GLUCOSE BLD-MCNC: 164 MG/DL (ref 75–99)
GLUCOSE BLD-MCNC: 171 MG/DL (ref 75–99)
GLUCOSE SERPL-MCNC: 140 MG/DL (ref 74–99)
HCT VFR BLD AUTO: 28.9 % (ref 39–53)
HGB BLD-MCNC: 8.9 GM/DL (ref 13–17.5)
LYMPHOCYTES # SPEC AUTO: 0.6 K/UL (ref 1–4.8)
LYMPHOCYTES NFR SPEC AUTO: 15 %
MCH RBC QN AUTO: 26.4 PG (ref 25–35)
MCHC RBC AUTO-ENTMCNC: 30.6 G/DL (ref 31–37)
MCV RBC AUTO: 86.3 FL (ref 80–100)
MONOCYTES # BLD AUTO: 0.3 K/UL (ref 0–1)
MONOCYTES NFR BLD AUTO: 7 %
NEUTROPHILS # BLD AUTO: 2.6 K/UL (ref 1.3–7.7)
NEUTROPHILS NFR BLD AUTO: 71 %
PLATELET # BLD AUTO: 172 K/UL (ref 150–450)
POTASSIUM SERPL-SCNC: 3.9 MMOL/L (ref 3.5–5.1)
PROT SERPL-MCNC: 5.5 G/DL (ref 6.3–8.2)
SODIUM SERPL-SCNC: 138 MMOL/L (ref 137–145)
WBC # BLD AUTO: 3.6 K/UL (ref 3.8–10.6)

## 2020-08-19 RX ADMIN — INSULIN ASPART SCH UNIT: 100 INJECTION, SOLUTION INTRAVENOUS; SUBCUTANEOUS at 20:40

## 2020-08-19 RX ADMIN — INSULIN ASPART SCH UNIT: 100 INJECTION, SOLUTION INTRAVENOUS; SUBCUTANEOUS at 17:28

## 2020-08-19 RX ADMIN — SPIRONOLACTONE SCH MG: 25 TABLET, FILM COATED ORAL at 20:40

## 2020-08-19 RX ADMIN — PIPERACILLIN AND TAZOBACTAM SCH MLS/HR: 3; .375 INJECTION, POWDER, FOR SOLUTION INTRAVENOUS at 17:27

## 2020-08-19 RX ADMIN — TAMSULOSIN HYDROCHLORIDE SCH MG: 0.4 CAPSULE ORAL at 20:41

## 2020-08-19 RX ADMIN — PIPERACILLIN AND TAZOBACTAM SCH MLS/HR: 3; .375 INJECTION, POWDER, FOR SOLUTION INTRAVENOUS at 08:24

## 2020-08-19 RX ADMIN — CYANOCOBALAMIN TAB 500 MCG SCH MCG: 500 TAB at 08:25

## 2020-08-19 RX ADMIN — INSULIN ASPART SCH: 100 INJECTION, SOLUTION INTRAVENOUS; SUBCUTANEOUS at 07:16

## 2020-08-19 RX ADMIN — CEFAZOLIN SCH: 330 INJECTION, POWDER, FOR SOLUTION INTRAMUSCULAR; INTRAVENOUS at 23:06

## 2020-08-19 RX ADMIN — PIPERACILLIN AND TAZOBACTAM SCH MLS/HR: 3; .375 INJECTION, POWDER, FOR SOLUTION INTRAVENOUS at 23:10

## 2020-08-19 RX ADMIN — INSULIN DETEMIR SCH: 100 INJECTION, SOLUTION SUBCUTANEOUS at 07:15

## 2020-08-19 RX ADMIN — FUROSEMIDE SCH MG: 40 TABLET ORAL at 08:26

## 2020-08-19 RX ADMIN — Medication SCH UNIT: at 08:26

## 2020-08-19 RX ADMIN — FUROSEMIDE SCH MG: 40 TABLET ORAL at 17:27

## 2020-08-19 RX ADMIN — THERA TABS SCH EACH: TAB at 08:25

## 2020-08-19 RX ADMIN — CEFAZOLIN SCH: 330 INJECTION, POWDER, FOR SOLUTION INTRAMUSCULAR; INTRAVENOUS at 12:42

## 2020-08-19 RX ADMIN — Medication SCH MG: at 08:25

## 2020-08-19 RX ADMIN — INSULIN ASPART SCH: 100 INJECTION, SOLUTION INTRAVENOUS; SUBCUTANEOUS at 12:41

## 2020-08-19 RX ADMIN — SPIRONOLACTONE SCH MG: 25 TABLET, FILM COATED ORAL at 08:25

## 2020-08-19 RX ADMIN — CEFAZOLIN SCH: 330 INJECTION, POWDER, FOR SOLUTION INTRAMUSCULAR; INTRAVENOUS at 07:16

## 2020-08-19 RX ADMIN — ASPIRIN SCH: 325 TABLET ORAL at 08:26

## 2020-08-19 RX ADMIN — ISOSORBIDE MONONITRATE SCH MG: 30 TABLET, EXTENDED RELEASE ORAL at 08:26

## 2020-08-19 NOTE — PN
PROGRESS NOTE



DATE OF DICTATION:

08/19/2020



Patient is a 75-year-old pleasant white male admitted to hospital with fever, nausea,

vomiting, not feeling well.  He had a similar hospitalization 3 weeks ago for possible

SBP, treated with antibiotics and was discharged home.  Currently, he is on antibiotics

and he is doing much better.  He denies any abdominal pain. Reports no nausea or

vomiting.



There was question of acalculous cholecystitis and Dr. Gaitan was consulted.

However, the patient denies any abdominal pain since admission to the hospital and even

prior to hospitalization.



PHYSICAL EXAMINATION:

He appears comfortable.

Vital signs are stable.  Blood pressure is 164/73, pulse rate 61, temperature 97.5.

HEENT EXAMINATION: Unremarkable. Conjunctivae pink. Sclerae anicteric.  Oral cavity, no

lesions.

NECK: No JVD or lymph node enlargement.

CHEST: Clear to auscultation.

HEART:  Regular rate and rhythm.

ABDOMEN: Was slightly distended, but it was very benign.  Bowel sounds are positive. No

organomegaly.

EXTREMITIES: No pedal edema.

NEURO: He is alert and oriented x3.  No focal deficits.



LABS:

WBC 3.6, hemoglobin 8.9, platelets 172.  Basic metabolic panel is within normal limits.

BUN 31, creatinine 1.43.  AST, ALT normal. T bilirubin 0.4. Alkaline phosphatase 165.



IMPRESSION:

1. 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

    decompensation.

2. Fever and leukocytosis at the time of admission to the hospital presently on broad-

    spectrum antibiotics.  Overall clinical condition is significantly improved most

    likely dealing with spontaneous bacterial peritonitis.  Ultrasound did show mild

    amount of ascites.

3. History of ascites currently maintained on diuretics with Lasix 40 mg twice daily

    and Aldactone 50 mg twice daily.

4. Longstanding history of diabetes mellitus and hypertension.



RECOMMENDATIONS:

1. Continue broad-spectrum antibiotics.

2. Advance diet as tolerated.

3. Continue diuretics.

4. Monitor LFTs closely.

5. Patient can be discharged home on p.o. antibiotics and will consider starting him

    on maintenance antibiotics as a part of secondary prophylaxis to prevent

    spontaneous bacterial peritonitis in the future.  The plan was discussed with the

    patient as well as his son at the bedside.

Thank you for this consultation.





MMODL / IJN: [id number] / Job#: [id number]

## 2020-08-19 NOTE — CONS
CONSULTATION



DATE OF DICTATION:

08/18/2020



REASON FOR CONSULTATION:

History of cirrhosis of the liver and elevated LFTs.



HISTORY OF PRESENT ILLNESS:

The patient is a 75-year-old pleasant white male who was recently diagnosed with liver

cirrhosis and ascites for which he underwent large-volume paracentesis during his last

hospitalization 2 months ago.  He was admitted to the hospital because of fever with a

temperature of 102.1. He had a similar hospitalization about 3 weeks ago for fever and

some abdominal pain, was thought to have spontaneous bacterial peritonitis treated with

antibiotics and the patient was subsequently discharged home. He was seen in the office

by me about 2 weeks ago and overall was doing well.



Today he denies any abdominal pain.  He reports no nausea, vomiting.  He denies any

rectal bleeding or melena.



He did have ultrasound of the abdomen done that showed mild thickening of the

gallbladder wall and possibility of acalculous cholecystitis could not be excluded.

Because of the fever, he was started on broad-spectrum antibiotics and Surgery was

consulted.



PAST MEDICAL HISTORY:

Significant for diabetes mellitus, cirrhosis of the liver, hypertension,

hyperlipidemia, history of congestive heart failure, benign prostatic hypertrophy,

ascites requiring paracentesis on 2 different occasions in the last 4 months.



PAST SURGICAL HISTORY:

Umbilical hernia repair.



MEDICATIONS:

Medications at home include Lasix, Aldactone 50 mg twice daily, potassium chloride,

Xanax, vitamin D3, multivitamin, insulin, benazepril, Flomax, iron sulfate,

nitroglycerin, carvedilol, Lasix.



FAMILY HISTORY:

Mother had breast cancer.  Father unable to obtain.  Sister had some kind of cancer and

other brother has diabetes mellitus.



ALLERGIES:

None.



REVIEW OF SYSTEMS:

CARDIOPULMONARY: He denies any chest pain or shortness of breath.

GENITOURINARY:  No dysuria or hematuria.

MUSCULOSKELETAL: Unremarkable.

SKIN: Unremarkable.

ENDOCRINE: Unremarkable.

NEUROLOGY: Unremarkable.

PSYCHIATRIC: Unremarkable.

ENT/VISION: Unremarkable.

CONSTITUTIONAL: No recent weight loss.  No fever, chills, night sweats.



PHYSICAL EXAMINATION:

He appears comfortable.  No apparent distress.

Vital signs are stable. Blood pressure 115/56, pulse rate 58, temperature 97.7. HEENT

EXAMINATION: Unremarkable. Conjunctivae pink. Sclerae anicteric. Oral cavity, no

lesions.

NECK: No JVD or lymph node enlargement.

CHEST: Clear to auscultation.

HEART:  Regular rate and rhythm.

ABDOMEN:  Soft. It was nontender, nondistended.  Bowel sounds are positive.  No

organomegaly.  No ascites noted.

EXTREMITIES: Trace pedal edema.

SKIN: No rashes.

NEURO: He is alert and oriented x3.  No focal deficits.



LABS:

Labs from yesterday: WBC 11.8 today it is 5.4, hemoglobin 9.8, platelets 235. PTT, INR

is within normal limits.  AST and ALT 21 and _____, respectively. Alkaline phosphatase

252 and T-bilirubin is normal.  Lipase is normal.



IMPRESSION:

1. This is a patient with history of cirrhosis of the liver secondary to nonalcoholic

    fatty liver disease that was diagnosed about 2 months ago that presents to the

    hospital with fever, but no abdominal pain and no other symptoms.  He had a similar

    hospitalization 3 weeks ago and was treated for presumed spontaneous bacterial

    peritonitis with antibiotics and he did well. He had a similar presentation during

    this time.  Ultrasound of the abdomen did show mild thickening of the gallbladder

    wall.  No gallstones noted and mild ascites seen.  At this time it is likely that

    he may have spontaneous bacterial peritonitis again, but possibility of acalculous

    cholecystitis cannot be excluded and Surgery has been consulted.  He presently

    remains on broad-spectrum antibiotics with Zosyn 3.375 grams clinically doing well

    and today has been afebrile.

2. History of cirrhosis of the liver with portal hypertension and mild ascites,

    status post large-volume paracentesis 2 months ago.

3. Longstanding history of diabetes mellitus.

4. Hypertension and hyperlipidemia.



RECOMMENDATION:

1. Continue with broad-spectrum antibiotics.

2. Continue with Lasix and Aldactone at the current dose.

3. Start him on a clear liquid diet.

4. Await recommendations from surgical consultation.

5. We will follow with you closely.

Thank you for this consultation.





MMODL / IJN: [id number] / Job#: [id number]

## 2020-08-19 NOTE — P.PN
Subjective


Progress Note Date: 08/19/20





CHIEF COMPLAINT: Abdominal pain





HISTORY OF PRESENT ILLNESS: Patient is being treated for acalculous naz

cystitis.  He is on IV antibiotics.  Patient reports improvement in his 

abdominal pain.  He was initially scheduled for laparoscopic cholecystectomy 

yesterday.  However, son refused the surgery.  Patient seen with Dr. Gaitan 

this morning.  His son at bedside.  Dr. Gaitan and answered questions for the 

son.  At this time son is hesitant for his dad to have the surgery.  We are 

anticipating possible surgery early next week.





PHYSICAL EXAM: 


VITAL SIGNS: Reviewed.


GENERAL: Well-developed in no acute distress. 


HEENT:  No sclera icterus. Extraocular movements grossly intact.  Moist buccal 

mucosa. Head is atraumatic, normocephalic. 


ABDOMEN:  Soft.  Nondistended. Nontender. 


NEUROLOGIC: Alert and oriented. Cranial nerves II through XII grossly intact.





ASSESSMENT: 


1.  Acute Acalculous cholecystitis with sepsis





PLAN: 


-A cholecystectomy was canceled yesterday.  Patient's son refused the surgery.  

The son's questions were all answered today by Dr. Gaitan.  At this time he is

still hesitant to have his father proceed with surgery.  But he will continue to

think about it.  We will plan for possible laparoscopic cholecystectomy early 

next week


-Continue IV antibiotics


-Continue IV fluids


-Start carb consistent low-fat diet





Physician Assistant note has been reviewed by physician. Signing provider agrees

with the documented findings, assessment, and plan of care. 





Objective





- Vital Signs


Vital signs: 


                                   Vital Signs











Temp  96.9 F L  08/19/20 12:00


 


Pulse  59 L  08/19/20 12:00


 


Resp  18   08/19/20 12:00


 


BP  149/65   08/19/20 12:00


 


Pulse Ox  95   08/19/20 12:00








                                 Intake & Output











 08/18/20 08/19/20 08/19/20





 18:59 06:59 18:59


 


Intake Total 240  


 


Output Total 450 1600 301


 


Balance -210 -1600 -301


 


Weight  98.3 kg 


 


Intake:   


 


  Oral 240  


 


Output:   


 


  Urine 450 1600 300


 


  Urine/Stool Mix   1


 


Other:   


 


  Voiding Method Toilet Toilet 





 Diaper Diaper 


 


  # Voids  1 


 


  # Bowel Movements 1 1 














- Labs


CBC & Chem 7: 


                                 08/19/20 05:49





                                 08/19/20 05:49


Labs: 


                  Abnormal Lab Results - Last 24 Hours (Table)











  08/18/20 08/18/20 08/19/20 Range/Units





  17:27 20:06 05:49 


 


WBC     (3.8-10.6)  k/uL


 


RBC     (4.30-5.90)  m/uL


 


Hgb     (13.0-17.5)  gm/dL


 


Hct     (39.0-53.0)  %


 


MCHC     (31.0-37.0)  g/dL


 


RDW     (11.5-15.5)  %


 


Lymphocytes #     (1.0-4.8)  k/uL


 


BUN    31 H  (9-20)  mg/dL


 


Creatinine    1.43 H  (0.66-1.25)  mg/dL


 


Glucose    140 H  (74-99)  mg/dL


 


POC Glucose (mg/dL)  130 H  160 H   (75-99)  mg/dL


 


Calcium    7.4 L  (8.4-10.2)  mg/dL


 


Alkaline Phosphatase    165 H  ()  U/L


 


Total Protein    5.5 L  (6.3-8.2)  g/dL


 


Albumin    2.4 L  (3.5-5.0)  g/dL














  08/19/20 08/19/20 08/19/20 Range/Units





  05:49 06:16 12:24 


 


WBC  3.6 L    (3.8-10.6)  k/uL


 


RBC  3.35 L    (4.30-5.90)  m/uL


 


Hgb  8.9 L    (13.0-17.5)  gm/dL


 


Hct  28.9 L    (39.0-53.0)  %


 


MCHC  30.6 L    (31.0-37.0)  g/dL


 


RDW  16.0 H    (11.5-15.5)  %


 


Lymphocytes #  0.6 L    (1.0-4.8)  k/uL


 


BUN     (9-20)  mg/dL


 


Creatinine     (0.66-1.25)  mg/dL


 


Glucose     (74-99)  mg/dL


 


POC Glucose (mg/dL)   147 H  143 H  (75-99)  mg/dL


 


Calcium     (8.4-10.2)  mg/dL


 


Alkaline Phosphatase     ()  U/L


 


Total Protein     (6.3-8.2)  g/dL


 


Albumin     (3.5-5.0)  g/dL








                      Microbiology - Last 24 Hours (Table)











 08/17/20 16:50 Blood Culture - Preliminary





 Blood    No Growth after 24 hours

## 2020-08-19 NOTE — PN
PROGRESS NOTE



DATE OF SERVICE:

08/19/2020



REASON FOR FOLLOWUP:

Acute cholecystitis with sepsis.



INTERVAL HISTORY:

The patient is afebrile as of this morning.  The patient denies having any chest pain

or shortness of breath or cough.  No nausea, no vomiting. Abdominal pain only when he

pushes in the right upper quadrant area. No vomiting. No diarrhea.



PHYSICAL EXAMINATION:

Blood pressure 149/65 with a pulse of 59, temperature 96.9.  He is 95% on room air.

General description is an elderly male lying in bed in no distress.

RESPIRATORY SYSTEM: Unlabored breathing. Clear to auscultation anteriorly.

HEART: S1, S2.  Regular rate and rhythm.

ABDOMEN: Soft. Mildly tender in the right upper quadrant. There is no guarding or

rigidity.



LABS:

Hemoglobin is 8.9, white count 3.6, BUN of 31, creatinine 1.43.



DIAGNOSTIC IMPRESSION AND PLAN:

Patient admitted to hospital with sepsis. Source is acute cholecystitis.  Surgery is on

the case; recommended cholecystectomy.  However, the family apparently is refusing. The

patient's condition has been discussed in detail with the risk of recurrent

cholecystitis and risk of perforation.  May benefit from cholecystectomy during this

admission.  Continue with Zosyn and monitor his clinical course closely.





MMODL / IJN: [id number] / Job#: [id number]

## 2020-08-19 NOTE — P.PN
Subjective


Progress Note Date: 08/19/20








Luis Acosta, is a 75-year-old male who presented to [facility] emergency room with fever , his temperature was 103 at home , mental 

status changes , and abdominal pain , patient was recently diagnosed with liver 

cirrhosis he had ascites with history of paracentesis, he was admitted with 

fever 3 weeks ago, and diagnosis was presumed as spontaneous bacterial 

peritonitis although cultures were negative, patient improved and was discharged

home he returned to emergency room with similar symptoms.  Patient was evaluated

in the emergency room his temperature was 1 or 2.7 pulse 89 respiration 18 and 

blood pressure 105/49 pulse ox 97% on room air, his white blood count was 11.8 

hemoglobin 9.8 platelet count 235 BUN was elevated at 34 creatinine 1.33 

alkaline phosphatase was elevated at 252 AST and ALT were normal.  Gallbladder 

ultrasound revealed gallbladder wall thickening patient was admitted to medical 

floor, he was started on IV antibiotic Zosyn, surgical consultation was 

requested.


Patient has a known history of diabetes Mellitus, history of hypertension, 

history of hyperlipidemia, history of stroke, history of congestive heart 

failure, and history of benign prostatic hypertrophy.


On review of systems patient is alert and oriented in no distress he is denying 

any symptoms at this time there is no fever or chills no headache or dizziness 

no chest pain no shortness of breath no cough no nausea or vomiting no abdominal

pain no diarrhea no burning with urination no frequency or urgency no hematuria.





On 08/19/2020 patient was seen and examined on the medical floor he is alert and

oriented 3 in no apparent distress, there is no fever or chills no headache or 

dizziness no chest pain no shortness of breath no cough no nausea or vomiting no

abdominal pain no diarrhea no burning with urination no frequency or urgency and

no hematuria.  Patient and family have refused cholecystectomy yesterday, 

continue IV antibiotic, will continue discussion with son in regard to possible 

cholecystectomy early next week.





Objective





- Vital Signs


Vital signs: 


                                   Vital Signs











Temp  96.9 F L  08/19/20 12:00


 


Pulse  59 L  08/19/20 12:00


 


Resp  18   08/19/20 12:00


 


BP  149/65   08/19/20 12:00


 


Pulse Ox  95   08/19/20 12:00








                                 Intake & Output











 08/18/20 08/19/20 08/19/20





 18:59 06:59 18:59


 


Intake Total 240  


 


Output Total 450 1600 301


 


Balance -210 -1600 -301


 


Weight  98.3 kg 


 


Intake:   


 


  Oral 240  


 


Output:   


 


  Urine 450 1600 300


 


  Urine/Stool Mix   1


 


Other:   


 


  Voiding Method Toilet Toilet 





 Diaper Diaper 


 


  # Voids  1 


 


  # Bowel Movements 1 1 














- Exam








In general patient is alert and oriented in no apparent distress


HEENT head normocephalic and atraumatic


Neck is supple no JVD no goiter no lymphadenopathy


Chest exam reveals a few scattered rhonchi no wheezing


Cardiac exam reveals regular heart sounds no gallops no murmurs


Abdomen is soft nontender no organomegaly


Extremity exam reveals no edema no cyanosis or clubbing


Neurological examination reveals no gross focal deficits





- Labs


CBC & Chem 7: 


                                 08/19/20 05:49





                                 08/19/20 05:49


Labs: 


                  Abnormal Lab Results - Last 24 Hours (Table)











  08/18/20 08/18/20 08/19/20 Range/Units





  17:27 20:06 05:49 


 


WBC     (3.8-10.6)  k/uL


 


RBC     (4.30-5.90)  m/uL


 


Hgb     (13.0-17.5)  gm/dL


 


Hct     (39.0-53.0)  %


 


MCHC     (31.0-37.0)  g/dL


 


RDW     (11.5-15.5)  %


 


Lymphocytes #     (1.0-4.8)  k/uL


 


BUN    31 H  (9-20)  mg/dL


 


Creatinine    1.43 H  (0.66-1.25)  mg/dL


 


Glucose    140 H  (74-99)  mg/dL


 


POC Glucose (mg/dL)  130 H  160 H   (75-99)  mg/dL


 


Calcium    7.4 L  (8.4-10.2)  mg/dL


 


Alkaline Phosphatase    165 H  ()  U/L


 


Total Protein    5.5 L  (6.3-8.2)  g/dL


 


Albumin    2.4 L  (3.5-5.0)  g/dL














  08/19/20 08/19/20 08/19/20 Range/Units





  05:49 06:16 12:24 


 


WBC  3.6 L    (3.8-10.6)  k/uL


 


RBC  3.35 L    (4.30-5.90)  m/uL


 


Hgb  8.9 L    (13.0-17.5)  gm/dL


 


Hct  28.9 L    (39.0-53.0)  %


 


MCHC  30.6 L    (31.0-37.0)  g/dL


 


RDW  16.0 H    (11.5-15.5)  %


 


Lymphocytes #  0.6 L    (1.0-4.8)  k/uL


 


BUN     (9-20)  mg/dL


 


Creatinine     (0.66-1.25)  mg/dL


 


Glucose     (74-99)  mg/dL


 


POC Glucose (mg/dL)   147 H  143 H  (75-99)  mg/dL


 


Calcium     (8.4-10.2)  mg/dL


 


Alkaline Phosphatase     ()  U/L


 


Total Protein     (6.3-8.2)  g/dL


 


Albumin     (3.5-5.0)  g/dL








                      Microbiology - Last 24 Hours (Table)











 08/17/20 16:50 Blood Culture - Preliminary





 Blood    No Growth after 24 hours














Assessment and Plan


Plan: 








1. Acalculous cholecystitis, surgical consultation requested


2.  Underlying history of liver cirrhosis, with history of Acites, history of 

paracentesis and history of spontaneous bacterial peritonitis


3. Underlying history of diabetes mellitus


4. Underlying history of hypertension


5. Underlying history of hyperlipidemia


6. Previous history of stroke


7. Previous history of congestive heart failure, stable at this time.





Patient was started on IV Zosyn surgical consultation, gastroenterology 

consultation and infectious disease consultation were requested

## 2020-08-20 LAB
ALBUMIN SERPL-MCNC: 2.3 G/DL (ref 3.5–5)
ALP SERPL-CCNC: 137 U/L (ref 38–126)
ALT SERPL-CCNC: 11 U/L (ref 4–49)
ANION GAP SERPL CALC-SCNC: 5 MMOL/L
AST SERPL-CCNC: 15 U/L (ref 17–59)
BASOPHILS # BLD AUTO: 0 K/UL (ref 0–0.2)
BASOPHILS NFR BLD AUTO: 1 %
BUN SERPL-SCNC: 23 MG/DL (ref 9–20)
CALCIUM SPEC-MCNC: 7.6 MG/DL (ref 8.4–10.2)
CHLORIDE SERPL-SCNC: 106 MMOL/L (ref 98–107)
CO2 SERPL-SCNC: 27 MMOL/L (ref 22–30)
EOSINOPHIL # BLD AUTO: 0.1 K/UL (ref 0–0.7)
EOSINOPHIL NFR BLD AUTO: 3 %
ERYTHROCYTE [DISTWIDTH] IN BLOOD BY AUTOMATED COUNT: 3.3 M/UL (ref 4.3–5.9)
ERYTHROCYTE [DISTWIDTH] IN BLOOD: 16 % (ref 11.5–15.5)
GLUCOSE BLD-MCNC: 105 MG/DL (ref 75–99)
GLUCOSE BLD-MCNC: 108 MG/DL (ref 75–99)
GLUCOSE BLD-MCNC: 171 MG/DL (ref 75–99)
GLUCOSE BLD-MCNC: 79 MG/DL (ref 75–99)
GLUCOSE SERPL-MCNC: 149 MG/DL (ref 74–99)
HCT VFR BLD AUTO: 28.1 % (ref 39–53)
HGB BLD-MCNC: 8.7 GM/DL (ref 13–17.5)
LYMPHOCYTES # SPEC AUTO: 0.9 K/UL (ref 1–4.8)
LYMPHOCYTES NFR SPEC AUTO: 28 %
MCH RBC QN AUTO: 26.4 PG (ref 25–35)
MCHC RBC AUTO-ENTMCNC: 30.9 G/DL (ref 31–37)
MCV RBC AUTO: 85.2 FL (ref 80–100)
MONOCYTES # BLD AUTO: 0.2 K/UL (ref 0–1)
MONOCYTES NFR BLD AUTO: 8 %
NEUTROPHILS # BLD AUTO: 1.9 K/UL (ref 1.3–7.7)
NEUTROPHILS NFR BLD AUTO: 58 %
PLATELET # BLD AUTO: 167 K/UL (ref 150–450)
POTASSIUM SERPL-SCNC: 3.8 MMOL/L (ref 3.5–5.1)
PROT SERPL-MCNC: 5.4 G/DL (ref 6.3–8.2)
SODIUM SERPL-SCNC: 138 MMOL/L (ref 137–145)
WBC # BLD AUTO: 3.2 K/UL (ref 3.8–10.6)

## 2020-08-20 RX ADMIN — Medication SCH UNIT: at 08:53

## 2020-08-20 RX ADMIN — ISOSORBIDE MONONITRATE SCH MG: 30 TABLET, EXTENDED RELEASE ORAL at 08:53

## 2020-08-20 RX ADMIN — PIPERACILLIN AND TAZOBACTAM SCH MLS/HR: 3; .375 INJECTION, POWDER, FOR SOLUTION INTRAVENOUS at 17:08

## 2020-08-20 RX ADMIN — INSULIN ASPART SCH: 100 INJECTION, SOLUTION INTRAVENOUS; SUBCUTANEOUS at 17:04

## 2020-08-20 RX ADMIN — SPIRONOLACTONE SCH MG: 25 TABLET, FILM COATED ORAL at 20:49

## 2020-08-20 RX ADMIN — SPIRONOLACTONE SCH MG: 25 TABLET, FILM COATED ORAL at 08:53

## 2020-08-20 RX ADMIN — FUROSEMIDE SCH MG: 40 TABLET ORAL at 08:53

## 2020-08-20 RX ADMIN — FUROSEMIDE SCH MG: 40 TABLET ORAL at 17:08

## 2020-08-20 RX ADMIN — PIPERACILLIN AND TAZOBACTAM SCH MLS/HR: 3; .375 INJECTION, POWDER, FOR SOLUTION INTRAVENOUS at 23:07

## 2020-08-20 RX ADMIN — INSULIN ASPART SCH UNIT: 100 INJECTION, SOLUTION INTRAVENOUS; SUBCUTANEOUS at 06:16

## 2020-08-20 RX ADMIN — CEFAZOLIN SCH MLS/HR: 330 INJECTION, POWDER, FOR SOLUTION INTRAMUSCULAR; INTRAVENOUS at 22:18

## 2020-08-20 RX ADMIN — PIPERACILLIN AND TAZOBACTAM SCH MLS/HR: 3; .375 INJECTION, POWDER, FOR SOLUTION INTRAVENOUS at 08:52

## 2020-08-20 RX ADMIN — INSULIN ASPART SCH: 100 INJECTION, SOLUTION INTRAVENOUS; SUBCUTANEOUS at 20:30

## 2020-08-20 RX ADMIN — Medication SCH MG: at 08:53

## 2020-08-20 RX ADMIN — CEFAZOLIN SCH MLS/HR: 330 INJECTION, POWDER, FOR SOLUTION INTRAMUSCULAR; INTRAVENOUS at 11:29

## 2020-08-20 RX ADMIN — CYANOCOBALAMIN TAB 500 MCG SCH MCG: 500 TAB at 08:54

## 2020-08-20 RX ADMIN — CEFAZOLIN SCH MLS/HR: 330 INJECTION, POWDER, FOR SOLUTION INTRAMUSCULAR; INTRAVENOUS at 17:08

## 2020-08-20 RX ADMIN — INSULIN DETEMIR SCH UNIT: 100 INJECTION, SOLUTION SUBCUTANEOUS at 08:54

## 2020-08-20 RX ADMIN — THERA TABS SCH EACH: TAB at 08:53

## 2020-08-20 RX ADMIN — TAMSULOSIN HYDROCHLORIDE SCH MG: 0.4 CAPSULE ORAL at 20:49

## 2020-08-20 RX ADMIN — ASPIRIN SCH: 325 TABLET ORAL at 12:37

## 2020-08-20 RX ADMIN — INSULIN ASPART SCH: 100 INJECTION, SOLUTION INTRAVENOUS; SUBCUTANEOUS at 12:37

## 2020-08-20 NOTE — CDI
Documentation Clarification Form



Date: 08/20/2020 02:04:51 PM

From: Tonia Nair RN, CCDS

Phone: [phone]

MRN: [id number]

Admit Date: 08/17/2020 06:38:00 PM

Patient Name: Luis Acosta

Visit Number: [id number]



ATTENTION: The Clinical Documentation Specialists (CDI) and [facility] Coding Staff 
appreciate your assistance in clarifying documentation. Please respond to the 
clarification below the line at the bottom and electronically sign. The CDI & 
[facility] Coding staff will review the response and follow-up if needed. Please note: 
Queries are made part of the Legal Health Record. If you have any questions, 
please contact the author of this message via ITS.



Dr. Marlin Tafoya



An elevated BUN and Creatinine have been noted in the patient's laboratory 
studies.  Please provide clinical significance.

History/Risk Factors:

7/30/2020 Patients baseline BUN/CR/GFR: 20/1.08/66

HTN, CHF, Cirrhosis of liver with acites, DM



Clinical Indicators:

8/17-8/20 Current    BUN: 34/33/31/23

Cr: 1.33/1.42/1.43/1.19

GFR: 52/48/48/59



Treatment:      

Lasix 40 mg PO BID

8/17 500 cc 0.9% NS IVF Bolus followed by 130 cc/hr



In order to capture the severity of condition, please clarify if the condition 
the labs signify:



 Acute renal failure, Please specify etiology (if known): 

    Cortical Necrosis

    Medullary Necrosis

   Tubular Necrosis

Acute kidney injury 

Acute on chronic renal failure

   CKD Stage 1 GFR >90

   CKD Stage 2 GFR 60-89

   CKD Stage 3 GFR 30-59

   CKD Stage 4 GFR 15-29

   CKD Stage 5 GFR <15

Chronic renal failure/Chronic Kidney disease (CKD) please stage (if known):

   CKD Stage 1 GFR >90

   CKD Stage 2 GFR 60-89

   CKD Stage 3 GFR 30-59

   CKD Stage 4 GFR 15-29

   CKD Stage 5 GFR <15

Other, please specify _________

Unable to determine



(Last Revision: April 2018)

___________________________________________________________________________

Chronic kidney disease stage 3

MTDD

## 2020-08-20 NOTE — CDI
Documentation Clarification Form



Date: 08/20/2020 01:43:29 PM

From: Tonia Nair RN, CCDS

Phone: [phone]

MRN: [id number]

Admit Date: 08/17/2020 06:38:00 PM

Patient Name: Luis Acosta

Visit Number: [id number]



ATTENTION: The Clinical Documentation Specialists (CDI) and [facility] Coding Staff 
appreciate your assistance in clarifying documentation. Please respond to the 
clarification below the line at the bottom and electronically sign. The CDI & 
[facility] Coding staff will review the response and follow-up if needed. Please note: 
Queries are made part of the Legal Health Record. If you have any questions, 
please contact the author of this message via ITS.



Dr. Marlin Tafoya



CHF is documented in the PMH and requires further specificity.

History/Risk Factors:

CHF, DM, cirrhosis of liver, HTN, Ascities



Clinical Indicators: 

8/18 H&P: "Previous history of congestive heart failure, stable at this time"

8/17 1619 VS/Pulse OX: Temp 102.7, HR 89, RR 18, B/P 105/49, Spo2 97% RA

8/17 BNP: 6320

6/27/2020 Echocardiogram Results: EF 45-50%, moderate concentric LVH, Basal 
inferior and basal inferoseptal hypokinesis

8/17 Chest X Ray:"No active cardiopulmonary disease. No change."



Treatment:  

Coreg 12.5 mg PO BID

Lasix 40 mg PO BID

Imdur 30 mg PO Q AM

Zestril 40 mg PO Q AM

8/17 0.9%  cc fluid bolus followed by 130 cc/hr

Aldactone 50 mg PO BID



In your professional opinion, can you please clarify the acuity and type of CHF 
if known?



Chronic Systolic Heart Failure:

Chronic Diastolic Heart Failure:

Chronic Systolic & Diastolic Heart Failure:

Unable to Determine

Other, please specify________________



(Last Revision: April 2018)

___________________________________________________________________________



Chronic Systolic heart failure

MTDD

## 2020-08-20 NOTE — PN
PROGRESS NOTE



DATE OF SERVICE:

08/20/2020



REASON FOR FOLLOWUP:

Sepsis and acute cholecystitis.



INTERVAL HISTORY:

The patient is currently afebrile, has been breathing comfortably. The patient denies

having any chest pain or shortness of breath or cough.  No nausea, vomiting, abdominal

pain or diarrhea.



PHYSICAL EXAMINATION:

Blood pressure 155/80 with a pulse of 55, temperature 98. He is 96% on room air.

General description is an elderly male lying in bed in no distress.

RESPIRATORY SYSTEM: Unlabored breathing with decreased breath sounds at the base. No

wheeze.

HEART: S1, S2.  Regular rate and rhythm.

ABDOMEN: Soft. No tenderness.



LABS:

Hemoglobin 8.6, white count 3.2, BUN of 23, creatinine 1.19.  Blood culture has been

negative.



DIAGNOSTIC IMPRESSION AND PLAN:

Patient admitted to hospital with sepsis; concern likely for acute cholecystitis.

Clinically responding to Zosyn; to continue. Will monitor his clinical course closely.





MMODL / IJN: [id number] / Job#: [id number]

## 2020-08-20 NOTE — PN
PROGRESS NOTE



DATE OF SERVICE:

08/20/2020



HISTORY OF PRESENT ILLNESS:

This patient is a 75-year-old white male with alcoholic cirrhosis of the liver,

admitted to the hospital with possible spontaneous bacterial peritonitis.  He had fever

and leukocytosis.  Presently on broad-spectrum antibiotics and doing well.  No

abdominal pain.  No new symptoms.



PHYSICAL EXAMINATION:

Appears comfortable.

VITAL SIGNS:  Stable.  Blood pressure 150/86, pulse is 61, temperature 97.5.

HEENT: Examination unremarkable. Conjunctivae are pink.  Sclerae anicteric. Oral cavity

no lesions.

NECK: No JVD or lymph node enlargement.

CHEST: Clear to auscultation.

HEART:  Regular rate and rhythm.

ABDOMEN:  Soft, nontender, nondistended.  Small umbilical hernia noted.  Clinically no

ascites. EXTREMITIES: Trace pedal edema.

NEUROLOGIC:  Alert and oriented x3.  No focal deficits.



LABS:

From today WBC 3.2, hemoglobin 8.7, platelets 167.  Basic metabolic panel is normal.

BUN 23, creatinine 1.19.  AST and ALT 59 and 11 respectively.



IMPRESSION:

1. Fever and leukocytosis, possible spontaneous bacterial peritonitis. Had a similar

    episode 3 weeks ago.  Presently on antibiotics and doing well.  Fever and

    leukocytosis resolved.

2. History of ascites and diuretics.

3. Nonalcoholic cirrhosis of the liver diagnosed 4 months ago.



RECOMMENDATIONS:

1. Continue with antibiotics.

2. Monitor labs closely.

3. Continue diuretics at the current dose.

4. If he remains stable, he can be discharged home in the next 24 to 48 hours with

    outpatient followup in 1 week.



Thank you for this consultation.





MMETELVINAL / BRUCEN: [id number] / Job#: [id number]

## 2020-08-20 NOTE — CDI
Documentation Clarification Form



Date: 08/20/2020 01:56:09 PM

From: Tonia Nair RN, CCDS

Phone: [phone]

MRN: [id number]

Admit Date: 08/17/2020 06:38:00 PM

Patient Name: Luis Acosta

Visit Number: [id number]



ATTENTION: The Clinical Documentation Specialists (CDI) and [facility] Coding Staff 
appreciate your assistance in clarifying documentation. Please respond to the 
clarification below the line at the bottom and electronically sign. The CDI & 
[facility] Coding staff will review the response and follow-up if needed. Please note: 
Queries are made part of the Legal Health Record. If you have any questions, 
please contact the author of this message via ITS.



Dr. Marlin Tafoya



Please render your opinion on the clinical significance of the patients 
hemoglobin/hematocrit levels.

History/Risk Factors: 

Liver cirrhosis with ascities, SBP, CHF, CVA, DM, low iron levels   



Clinical indicators: 

8/17-8/20 Hgb: 9.8/8.7/8.9/8.7

8/17-8/20 Hct: 31.4/27.6/28.9/28.1



Treatment:     

Lab Monitoring AM Daily    

B-12 5000mcg PO QD

Feosol 325 mg PO QD

8/17 500cc 0.9%NS IVF Bolus followed by 130cc/hr

   

In order to capture the severity of condition, please clarify if the 
labs/clinical indicators signify:





Acute on chronic blood loss anemia

Chronic blood loss anemia

Iron deficiency anemia

Nutritional anemia

Anemia of chronic disease

Unable to determine

Other, please specify ___________



(Last Form Revision: March 2020)

___________________________________________________________________________

Anemia of chronic disease

MTDD

## 2020-08-20 NOTE — P.PN
Subjective


Progress Note Date: 08/20/20





CHIEF COMPLAINT: Abdominal pain





HISTORY OF PRESENT ILLNESS: Patient is being treated for acalculous naz

cystitis.  He is on IV antibiotics.  Patient no abdominal pain.  He was 

initially scheduled for laparoscopic cholecystectomy.  However, son refused the 

surgery.  Patient tolerating low sodium diet.  He is passing gas.  No bowel 

movement today.  Denies any nausea or vomiting.  Afebrile.  The before meals 2.2

hemoglobin 8.7 alk phos 137





PHYSICAL EXAM: 


VITAL SIGNS: Reviewed.


GENERAL: Well-developed in no acute distress. 


HEENT:  No sclera icterus. Extraocular movements grossly intact.  Moist buccal 

mucosa. Head is atraumatic, normocephalic. 


ABDOMEN:  Soft.  Nondistended. Nontender. 


NEUROLOGIC: Alert and oriented. Cranial nerves II through XII grossly intact.





ASSESSMENT: 


1.  Acute Acalculous cholecystitis with sepsis





PLAN: 


-cholecystectomy was canceled because patient's son refused the surgery.  The 

son's questions were all answered by Dr. Gaitan.  At this time he is still 

hesitant to have his father proceed with surgery.  But he will continue to think

about it.  We will plan for possible laparoscopic cholecystectomy early next 

week


-Continue IV antibiotics


-Continue IV fluids


-Continue current diet





Physician Assistant note has been reviewed by physician. Signing provider agrees

with the documented findings, assessment, and plan of care. 





Objective





- Vital Signs


Vital signs: 


                                   Vital Signs











Temp  98.0 F   08/20/20 11:56


 


Pulse  59 L  08/20/20 11:56


 


Resp  18   08/20/20 11:56


 


BP  155/80   08/20/20 11:56


 


Pulse Ox  96   08/20/20 11:56








                                 Intake & Output











 08/19/20 08/20/20 08/20/20





 18:59 06:59 18:59


 


Intake Total 240  240


 


Output Total 301 475 400


 


Balance -61 -475 -160


 


Weight  98.1 kg 


 


Intake:   


 


  Oral 240  240


 


Output:   


 


  Urine 300 475 400


 


  Urine/Stool Mix 1  


 


Other:   


 


  Voiding Method  Toilet Toilet





  Diaper Diaper


 


  # Bowel Movements   2














- Labs


CBC & Chem 7: 


                                 08/20/20 06:19





                                 08/20/20 06:19


Labs: 


                  Abnormal Lab Results - Last 24 Hours (Table)











  08/19/20 08/19/20 08/20/20 Range/Units





  16:54 19:55 06:12 


 


WBC     (3.8-10.6)  k/uL


 


RBC     (4.30-5.90)  m/uL


 


Hgb     (13.0-17.5)  gm/dL


 


Hct     (39.0-53.0)  %


 


MCHC     (31.0-37.0)  g/dL


 


RDW     (11.5-15.5)  %


 


Lymphocytes #     (1.0-4.8)  k/uL


 


BUN     (9-20)  mg/dL


 


Glucose     (74-99)  mg/dL


 


POC Glucose (mg/dL)  164 H  171 H  171 H  (75-99)  mg/dL


 


Calcium     (8.4-10.2)  mg/dL


 


AST     (17-59)  U/L


 


Alkaline Phosphatase     ()  U/L


 


Total Protein     (6.3-8.2)  g/dL


 


Albumin     (3.5-5.0)  g/dL














  08/20/20 08/20/20 08/20/20 Range/Units





  06:19 06:19 11:50 


 


WBC   3.2 L   (3.8-10.6)  k/uL


 


RBC   3.30 L   (4.30-5.90)  m/uL


 


Hgb   8.7 L   (13.0-17.5)  gm/dL


 


Hct   28.1 L   (39.0-53.0)  %


 


MCHC   30.9 L   (31.0-37.0)  g/dL


 


RDW   16.0 H   (11.5-15.5)  %


 


Lymphocytes #   0.9 L   (1.0-4.8)  k/uL


 


BUN  23 H    (9-20)  mg/dL


 


Glucose  149 H    (74-99)  mg/dL


 


POC Glucose (mg/dL)    108 H  (75-99)  mg/dL


 


Calcium  7.6 L    (8.4-10.2)  mg/dL


 


AST  15 L    (17-59)  U/L


 


Alkaline Phosphatase  137 H    ()  U/L


 


Total Protein  5.4 L    (6.3-8.2)  g/dL


 


Albumin  2.3 L    (3.5-5.0)  g/dL








                      Microbiology - Last 24 Hours (Table)











 08/17/20 16:50 Blood Culture - Preliminary





 Blood    No Growth after 48 hours

## 2020-08-21 VITALS — HEART RATE: 65 BPM | SYSTOLIC BLOOD PRESSURE: 127 MMHG | RESPIRATION RATE: 20 BRPM | DIASTOLIC BLOOD PRESSURE: 53 MMHG

## 2020-08-21 VITALS — TEMPERATURE: 97.9 F

## 2020-08-21 LAB
GLUCOSE BLD-MCNC: 106 MG/DL (ref 75–99)
GLUCOSE BLD-MCNC: 169 MG/DL (ref 75–99)
GLUCOSE BLD-MCNC: 89 MG/DL (ref 75–99)
GLUCOSE BLD-MCNC: 92 MG/DL (ref 75–99)

## 2020-08-21 RX ADMIN — Medication SCH MG: at 09:22

## 2020-08-21 RX ADMIN — INSULIN ASPART SCH: 100 INJECTION, SOLUTION INTRAVENOUS; SUBCUTANEOUS at 12:21

## 2020-08-21 RX ADMIN — CYANOCOBALAMIN TAB 500 MCG SCH MCG: 500 TAB at 09:22

## 2020-08-21 RX ADMIN — ISOSORBIDE MONONITRATE SCH MG: 30 TABLET, EXTENDED RELEASE ORAL at 09:21

## 2020-08-21 RX ADMIN — THERA TABS SCH EACH: TAB at 09:22

## 2020-08-21 RX ADMIN — PIPERACILLIN AND TAZOBACTAM SCH MLS/HR: 3; .375 INJECTION, POWDER, FOR SOLUTION INTRAVENOUS at 09:22

## 2020-08-21 RX ADMIN — INSULIN DETEMIR SCH UNIT: 100 INJECTION, SOLUTION SUBCUTANEOUS at 09:22

## 2020-08-21 RX ADMIN — INSULIN ASPART SCH: 100 INJECTION, SOLUTION INTRAVENOUS; SUBCUTANEOUS at 06:39

## 2020-08-21 RX ADMIN — CEFAZOLIN SCH: 330 INJECTION, POWDER, FOR SOLUTION INTRAMUSCULAR; INTRAVENOUS at 06:39

## 2020-08-21 RX ADMIN — CEFAZOLIN SCH: 330 INJECTION, POWDER, FOR SOLUTION INTRAMUSCULAR; INTRAVENOUS at 15:28

## 2020-08-21 RX ADMIN — ASPIRIN SCH: 325 TABLET ORAL at 09:23

## 2020-08-21 RX ADMIN — FUROSEMIDE SCH MG: 40 TABLET ORAL at 09:22

## 2020-08-21 RX ADMIN — SPIRONOLACTONE SCH MG: 25 TABLET, FILM COATED ORAL at 09:22

## 2020-08-21 RX ADMIN — Medication SCH UNIT: at 09:21

## 2020-08-21 NOTE — P.PN
Subjective


Progress Note Date: 08/21/20





CHIEF COMPLAINT: Abdominal pain





HISTORY OF PRESENT ILLNESS: Patient is being treated for acalculous naz

cystitis.  He is on IV antibiotics.  Patient has no abdominal pain.  He was 

initially scheduled for laparoscopic cholecystectomy.  However, son refused the 

surgery.  Patient tolerating low sodium diet.  He is passing gas and having 

bowel movements.  He denies any nausea or vomiting.  Afebrile.





PHYSICAL EXAM: 


VITAL SIGNS: Reviewed.


GENERAL: Well-developed in no acute distress. 


HEENT:  No sclera icterus. Extraocular movements grossly intact.  Moist buccal 

mucosa. Head is atraumatic, normocephalic. 


ABDOMEN:  Soft.  Nondistended. Nontender. 


NEUROLOGIC: Alert and oriented. Cranial nerves II through XII grossly intact.





ASSESSMENT: 


1.  Acute Acalculous cholecystitis with sepsis improving with antibiotics.





PLAN: 


-Patient can be discharged from surgical standpoint.  Patient to follow-up with 

Dr. Gaitan in 1 week and readdress scheduling laparoscopic cholecystectomy 

outpatient


-Continue IV antibiotics per ID


-Continue current diet








Physician Assistant note has been reviewed by physician. Signing provider agrees

with the documented findings, assessment, and plan of care. 





Objective





- Vital Signs


Vital signs: 


                                   Vital Signs











Temp  97.9 F   08/21/20 09:28


 


Pulse  89   08/21/20 09:28


 


Resp  18   08/21/20 09:28


 


BP  121/61   08/21/20 09:28


 


Pulse Ox  94 L  08/21/20 09:28








                                 Intake & Output











 08/20/20 08/21/20 08/21/20





 18:59 06:59 18:59


 


Intake Total 480 480 120


 


Output Total 400 2250 400


 


Balance 80 -1770 -280


 


Weight  97 kg 


 


Intake:   


 


  Oral 480 480 120


 


Output:   


 


  Urine 400 2250 400


 


Other:   


 


  Voiding Method Toilet Toilet 





 Diaper Diaper 


 


  # Voids  1 


 


  # Bowel Movements 2  














- Labs


CBC & Chem 7: 


                                 08/20/20 06:19





                                 08/21/20 07:30


Labs: 


                  Abnormal Lab Results - Last 24 Hours (Table)











  08/20/20 08/20/20 08/21/20 Range/Units





  11:50 16:59 09:17 


 


POC Glucose (mg/dL)  108 H  105 H  169 H  (75-99)  mg/dL








                      Microbiology - Last 24 Hours (Table)











 08/17/20 16:50 Blood Culture - Preliminary





 Blood    No Growth after 72 hours

## 2020-08-21 NOTE — P.PN
Subjective


Progress Note Date: 08/20/20








Luis Acosta, is a 75-year-old male who presented to [facility] emergency room with fever , his temperature was 103 at home , mental 

status changes , and abdominal pain , patient was recently diagnosed with liver 

cirrhosis he had ascites with history of paracentesis, he was admitted with 

fever 3 weeks ago, and diagnosis was presumed as spontaneous bacterial 

peritonitis although cultures were negative, patient improved and was discharged

home he returned to emergency room with similar symptoms.  Patient was evaluated

in the emergency room his temperature was 1 or 2.7 pulse 89 respiration 18 and 

blood pressure 105/49 pulse ox 97% on room air, his white blood count was 11.8 

hemoglobin 9.8 platelet count 235 BUN was elevated at 34 creatinine 1.33 

alkaline phosphatase was elevated at 252 AST and ALT were normal.  Gallbladder 

ultrasound revealed gallbladder wall thickening patient was admitted to medical 

floor, he was started on IV antibiotic Zosyn, surgical consultation was 

requested.


Patient has a known history of diabetes Mellitus, history of hypertension, 

history of hyperlipidemia, history of stroke, history of congestive heart 

failure, and history of benign prostatic hypertrophy.


On review of systems patient is alert and oriented in no distress he is denying 

any symptoms at this time there is no fever or chills no headache or dizziness 

no chest pain no shortness of breath no cough no nausea or vomiting no abdominal

pain no diarrhea no burning with urination no frequency or urgency no hematuria.





On 08/19/2020 patient was seen and examined on the medical floor he is alert and

oriented 3 in no apparent distress, there is no fever or chills no headache or 

dizziness no chest pain no shortness of breath no cough no nausea or vomiting no

abdominal pain no diarrhea no burning with urination no frequency or urgency and

no hematuria.  Patient and family have refused cholecystectomy yesterday, 

continue IV antibiotic, will continue discussion with son in regard to possible 

cholecystectomy early next week..





On 08/20/2020 patient was seen and examined on the medical floor he is alert and

oriented 3 in no distress there is no fever or chills no headache or dizziness 

no chest pain no shortness of breath no cough no nausea or vomiting no abdominal

pain no diarrhea no burning with urination no frequency or urgency no hematuria





Objective





- Vital Signs


Vital signs: 


                                   Vital Signs











Temp  97.8 F   08/20/20 08:00


 


Pulse  59 L  08/20/20 08:00


 


Resp  16   08/20/20 08:00


 


BP  135/79   08/20/20 08:00


 


Pulse Ox  96   08/20/20 08:00








                                 Intake & Output











 08/19/20 08/20/20 08/20/20





 18:59 06:59 18:59


 


Intake Total 240  


 


Output Total 301 475 


 


Balance -61 -475 


 


Weight  98.1 kg 


 


Intake:   


 


  Oral 240  


 


Output:   


 


  Urine 300 475 


 


  Urine/Stool Mix 1  


 


Other:   


 


  Voiding Method  Toilet 





  Diaper 














- Exam








In general patient is alert and oriented in no apparent distress


HEENT head normocephalic and atraumatic


Neck is supple no JVD no goiter no lymphadenopathy


Chest exam reveals a few scattered rhonchi no wheezing


Cardiac exam reveals regular heart sounds no gallops no murmurs


Abdomen is soft nontender no organomegaly


Extremity exam reveals no edema no cyanosis or clubbing


Neurological examination reveals no gross focal deficits





- Labs


CBC & Chem 7: 


                                 08/20/20 06:19





                                 08/21/20 07:30


Labs: 


                  Abnormal Lab Results - Last 24 Hours (Table)











  08/19/20 08/19/20 08/19/20 Range/Units





  12:24 16:54 19:55 


 


WBC     (3.8-10.6)  k/uL


 


RBC     (4.30-5.90)  m/uL


 


Hgb     (13.0-17.5)  gm/dL


 


Hct     (39.0-53.0)  %


 


MCHC     (31.0-37.0)  g/dL


 


RDW     (11.5-15.5)  %


 


Lymphocytes #     (1.0-4.8)  k/uL


 


BUN     (9-20)  mg/dL


 


Glucose     (74-99)  mg/dL


 


POC Glucose (mg/dL)  143 H  164 H  171 H  (75-99)  mg/dL


 


Calcium     (8.4-10.2)  mg/dL


 


AST     (17-59)  U/L


 


Alkaline Phosphatase     ()  U/L


 


Total Protein     (6.3-8.2)  g/dL


 


Albumin     (3.5-5.0)  g/dL














  08/20/20 08/20/20 08/20/20 Range/Units





  06:12 06:19 06:19 


 


WBC    3.2 L  (3.8-10.6)  k/uL


 


RBC    3.30 L  (4.30-5.90)  m/uL


 


Hgb    8.7 L  (13.0-17.5)  gm/dL


 


Hct    28.1 L  (39.0-53.0)  %


 


MCHC    30.9 L  (31.0-37.0)  g/dL


 


RDW    16.0 H  (11.5-15.5)  %


 


Lymphocytes #    0.9 L  (1.0-4.8)  k/uL


 


BUN   23 H   (9-20)  mg/dL


 


Glucose   149 H   (74-99)  mg/dL


 


POC Glucose (mg/dL)  171 H    (75-99)  mg/dL


 


Calcium   7.6 L   (8.4-10.2)  mg/dL


 


AST   15 L   (17-59)  U/L


 


Alkaline Phosphatase   137 H   ()  U/L


 


Total Protein   5.4 L   (6.3-8.2)  g/dL


 


Albumin   2.3 L   (3.5-5.0)  g/dL








                      Microbiology - Last 24 Hours (Table)











 08/17/20 16:50 Blood Culture - Preliminary





 Blood    No Growth after 48 hours














Assessment and Plan


Plan: 








1. Acalculous cholecystitis, surgical consultation requested


2.  Underlying history of liver cirrhosis, with history of Acites, history of 

paracentesis and history of spontaneous bacterial peritonitis


3. Underlying history of diabetes mellitus


4. Underlying history of hypertension


5. Underlying history of hyperlipidemia


6. Previous history of stroke


7. Previous history of congestive heart failure, stable at this time.





Patient was started on IV Zosyn surgical consultation, gastroenterology 

consultation and infectious disease consultation were requested

## 2020-08-21 NOTE — PN
PROGRESS NOTE



DATE OF SERVICE:

08/21/2020



INTERVAL HISTORY:

Patient is a 75-year-old pleasant white male admitted to the hospital with possible SBP

on broad-spectrum antibiotics. Doing well.  He denies any symptoms today.



PHYSICAL EXAMINATION:

Vital signs are stable.  Blood pressure 132/53, pulse rate 65, temperature 97.9.

HEENT: Examination unremarkable. Conjunctivae are pink. Sclerae anicteric. Oral cavity

no lesions.

NECK: No JVD or lymph node enlargement.

CHEST: Clear to auscultation.

HEART:  Regular rate and rhythm.

ABDOMEN:  Soft. Bowel sounds are positive.  No organomegaly.

NEURO: Alert and oriented x3.  No focal deficits.



LABS:

None available from today.



IMPRESSION:

1. The patient admitted to the hospital with fever and possible spontaneous bacterial

    peritonitis, on broad-spectrum antibiotics and doing well. Had a similar episode 3

    weeks ago.

2. 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

3. Mild ascites.



RECOMMENDATIONS:

1. Continue antibiotics.

2. Low-salt diet.

3. Continue diuretics.

4. Patient can be discharged home today with outpatient followup in 2 weeks.



Thank you for this consultation.





MMODL / IJN: [id number] / Job#: [id number]

## 2020-08-21 NOTE — P.DS
Providers


Date of admission: 


08/17/20 18:38





Expected date of discharge: 08/21/20


Attending physician: 


Marlin Tafoya





Consults: 





                                        





08/17/20 18:40


Consult Physician Routine 


   Consulting Provider: Ish Gaitan


   Consult Reason/Comments: Acalculous cholecystitis


   Do you want consulting provider notified?: Already Contacted


Consult Physician Routine 


   Consulting Provider: Franklin Marcus


   Consult Reason/Comments: Fever, acalculous cholecystitis


   Do you want consulting provider notified?: Yes





08/17/20 18:41


Consult Physician Routine 


   Consulting Provider: Endy Handy


   Consult Reason/Comments: Sepsis


   Do you want consulting provider notified?: Yes











[facility] care [facility] 


[facility]





[facility] Course: 








Diagnosis on discharge:














1. Acalculous cholecystitis, surgical consultation requested


2.  Underlying history of liver cirrhosis, with history of Acites, history of 

paracentesis and history of spontaneous bacterial peritonitis


3. Underlying history of diabetes mellitus


4. Underlying history of hypertension


5. Underlying history of hyperlipidemia


6. Previous history of stroke


7.  Acute on chronic diastolic congestive heart failure, stable at this time.























Hospital course:








Luis Acosta, is a 75-year-old male who presented to [facility] emergency room with fever , his temperature was 103 at home , mental 

status changes , and abdominal pain , patient was recently diagnosed with liver 

cirrhosis he had ascites with history of paracentesis, he was admitted with 

fever 3 weeks ago, and diagnosis was presumed as spontaneous bacterial 

peritonitis although cultures were negative, patient improved and was discharged

home he returned to emergency room with similar symptoms.  Patient was evaluated

in the emergency room his temperature was 1 or 2.7 pulse 89 respiration 18 and 

blood pressure 105/49 pulse ox 97% on room air, his white blood count was 11.8 

hemoglobin 9.8 platelet count 235 BUN was elevated at 34 creatinine 1.33 

alkaline phosphatase was elevated at 252 AST and ALT were normal.  Gallbladder 

ultrasound revealed gallbladder wall thickening patient was admitted to medical 

floor, he was started on IV antibiotic Zosyn, surgical consultation was 

requested.


Patient has a known history of diabetes Mellitus, history of hypertension, 

history of hyperlipidemia, history of stroke, history of congestive heart 

failure, and history of benign prostatic hypertrophy.


On review of systems patient is alert and oriented in no distress he is denying 

any symptoms at this time there is no fever or chills no headache or dizziness 

no chest pain no shortness of breath no cough no nausea or vomiting no abdominal

pain no diarrhea no burning with urination no frequency or urgency no hematuria.





On 08/19/2020 patient was seen and examined on the medical floor he is alert and

oriented 3 in no apparent distress, there is no fever or chills no headache or 

dizziness no chest pain no shortness of breath no cough no nausea or vomiting no

abdominal pain no diarrhea no burning with urination no frequency or urgency and

no hematuria.  Patient and family have refused cholecystectomy yesterday, co

ntinue IV antibiotic, will continue discussion with son in regard to possible 

cholecystectomy early next week.








On 08/20/2020 patient was seen and examined on the medical floor he is alert and

oriented 3 in no distress there is no fever or chills no headache or dizziness 

no chest pain no shortness of breath no cough no nausea or vomiting no abdominal

pain no diarrhea no burning with urination no frequency or urgency no hematuria.





Patient Condition at Discharge: Serious





Plan - Discharge Summary


Discharge Rx Participant: No


New Discharge Prescriptions: 


New


   Amoxicillin/Potassium Clav [Augmentin 500-125 Tablet] 1 tab PO Q12HR 10 Days 

#20 tab


   Acetaminophen Tab [Tylenol] 650 mg PO Q6HR PRN  tab


     PRN Reason: Mild Pain Or Fever > 100.5





Continue


   Benazepril HCl 40 mg PO QAM


   Tamsulosin [Flomax] 0.4 mg PO HS


   Isosorbide Mononitrate ER [Imdur] 30 mg PO QAM


   Ferrous Sulfate [Iron (65 MG Elemental)] 325 mg PO DAILY #30 tab


   Nitroglycerin Sl Tab (0.3mg) 0.3 mg SUBLINGUAL Q5M PRN


     PRN Reason: Chest Pain


   Cyanocobalamin (Vitamin B-12) [Vitamin B-12] 5,000 mcg PO DAILY


   carvediloL [Coreg*] 12.5 mg PO BID-W/MEALS  tab


   Spironolactone [Aldactone] 50 mg PO BID


   Insulin Glargine,Hum.rec.anlog [Basaglar Kwikpen U-100] 40 unit SQ QAM


   Multivitamins, Thera [Multivitamin (formulary)] 1 tab PO DAILY


   Furosemide [Lasix] 40 mg PO BID


   ALPRAZolam [Xanax] 0.25 mg PO BID PRN


     PRN Reason: Anxiety


   Cholecalciferol [Vitamin D3 (25 Mcg = 1000 Iu)] 1,000 unit PO DAILY


   Potassium Gluconate 99 mg PO DAILY


Discharge Medication List





Benazepril HCl 40 mg PO QAM 12/03/17 [History]


Tamsulosin [Flomax] 0.4 mg PO HS 12/04/17 [History]


Isosorbide Mononitrate ER [Imdur] 30 mg PO QAM 09/05/18 [History]


Ferrous Sulfate [Iron (65 MG Elemental)] 325 mg PO DAILY #30 tab 12/03/18 [Rx]


Cyanocobalamin (Vitamin B-12) [Vitamin B-12] 5,000 mcg PO DAILY 06/26/20 

[History]


Nitroglycerin Sl Tab (0.3mg) 0.3 mg SUBLINGUAL Q5M PRN 06/26/20 [History]


carvediloL [Coreg*] 12.5 mg PO BID-W/MEALS  tab 07/01/20 [Rx]


Furosemide [Lasix] 40 mg PO BID 07/27/20 [History]


Insulin Glargine,Hum.rec.anlog [Basaglar Kwikpen U-100] 40 unit SQ QAM 07/27/20 

[History]


Multivitamins, Thera [Multivitamin (formulary)] 1 tab PO DAILY 07/27/20 

[History]


Spironolactone [Aldactone] 50 mg PO BID 07/27/20 [History]


ALPRAZolam [Xanax] 0.25 mg PO BID PRN 08/17/20 [History]


Cholecalciferol [Vitamin D3 (25 Mcg = 1000 Iu)] 1,000 unit PO DAILY 08/17/20 

[History]


Potassium Gluconate 99 mg PO DAILY 08/17/20 [History]


Acetaminophen Tab [Tylenol] 650 mg PO Q6HR PRN  tab 08/21/20 [Rx]


Amoxicillin/Potassium Clav [Augmentin 500-125 Tablet] 1 tab PO Q12HR 10 Days #20

tab 08/21/20 [Rx]








Follow up Appointment(s)/Referral(s): 


Merry Grimaldo MD [Primary Care Provider] - 08/26/20 1:30 pm


Franklin Marcus MD [STAFF PHYSICIAN] - 09/09/20 3:30 pm


Ish Gaitan MD [STAFF PHYSICIAN] - 1 Week (Patient to follow up with Dr. Jones )


Patient Instructions/Handouts:  Cholecystitis (GEN)

## 2020-08-21 NOTE — PN
PROGRESS NOTE



DATE OF SERVICE:

08/21/2020



REASON FOR FOLLOWUP:

Acute cholecystitis.



INTERVAL HISTORY:

The patient is currently afebrile.  The patient is breathing comfortably.  The patient

denies having any chest pain or shortness of breath or cough.  No nausea, no vomiting.

No abdominal pain.  No diarrhea.  Anxious to go home.



PHYSICAL EXAMINATION:

Blood pressure 127/53 with a pulse of 85, temperature 97.9.  He is 99% on room air.

General description is an elderly male lying in bed in no distress.

RESPIRATORY SYSTEM: Unlabored breathing. Clear to auscultation anteriorly.

HEART: S1, S2.  Regular rate and rhythm.

ABDOMEN:  Soft, no tenderness.



LABS:

Creatinine is 1.11.  Culture has been negative.



DIAGNOSTIC IMPRESSION AND PLAN:

Patient admitted to hospital with sepsis. Source is acute cholecystitis.  Family has

refused any surgical intervention at this time.  He is currently on Zosyn with overall

improvement and wants to go home.  Antibiotic was switched over to Augmentin 875 b.i.d.

for about 10 days with close outpatient followup.  This was discussed in detail with

the patient; risks of recurrent cholecystitis and possible perforation.  However, he

does not want to go for any surgery.





MMODL / IJN: [id number] / Job#: [id number]

## 2020-08-23 NOTE — CDI
Documentation Clarification Form



Date: 8/23/20

From: Amanda Dooley

Phone: If you have a question about this query, please contact Marina Curtis, 
 at [phone] between 8am and 5pm.

Admit Date: 8/17/21

Discharge Date:8/21/20

Patient Name: Cecilio Acosta

Visit Number: [id number]



ATTENTION: The Clinical Documentation Specialists (CDI) and [facility] Coding Staff 
appreciate your assistance in clarifying documentation. Please respond to the 
clarification below the line at the bottom and electronically sign. The CDI & 
[facility] Coding staff will review the response and follow-up if needed. Please note: 
Queries are made part of the Legal Health Record. If you have any questions, 
please contact the author of this message via ITS.



Dear Dr. Tafoya



He was hypotensive is documented in Dr. Gaitan's consult note.  

History/Risk Factors: Sepsis, acute cholecystitis, spontaneous bacterial 
peritonitis

Clinical Indicators:  Decreased blood pressure, confusion

Patients B/P:  8/17/20 105/49, 93/58, 95/49, 89/44, 105/66, 110/58            

Labs: WBC 11.8, sodium 133, potassium 3.4, BU 34, creatinine 1.33, glucose 55

Treatment: Bolus of NS then @130 mls/hr



In your professional opinion, can you please specify the etiology of the 
hypotension if known?



Iatrogenic Hypotension

Postural Hypotension         

Idiopathic Hypotension

Drug Induced Hypotension

Chronic Hypotension

Other Condition, please specify ______

Unable to determine

___________________________________________________________________________

unable to determine

MTDD

## 2020-11-16 ENCOUNTER — HOSPITAL ENCOUNTER (OUTPATIENT)
Dept: HOSPITAL 47 - RADPROMAIN | Age: 75
Discharge: HOME | End: 2020-11-16
Attending: INTERNAL MEDICINE
Payer: MEDICARE

## 2020-11-16 VITALS — RESPIRATION RATE: 20 BRPM

## 2020-11-16 VITALS — SYSTOLIC BLOOD PRESSURE: 110 MMHG | HEART RATE: 64 BPM | DIASTOLIC BLOOD PRESSURE: 56 MMHG

## 2020-11-16 VITALS — TEMPERATURE: 97.8 F

## 2020-11-16 DIAGNOSIS — R18.8: Primary | ICD-10-CM

## 2020-11-16 LAB
INR PPP: 1 (ref ?–1.2)
PLATELET # BLD AUTO: 246 K/UL (ref 150–450)
PT BLD: 10.7 SEC (ref 9–12)

## 2020-11-16 PROCEDURE — 49083 ABD PARACENTESIS W/IMAGING: CPT

## 2020-11-16 PROCEDURE — 82947 ASSAY GLUCOSE BLOOD QUANT: CPT

## 2020-11-16 PROCEDURE — 85610 PROTHROMBIN TIME: CPT

## 2020-11-16 PROCEDURE — 36415 COLL VENOUS BLD VENIPUNCTURE: CPT

## 2020-11-16 PROCEDURE — 85049 AUTOMATED PLATELET COUNT: CPT

## 2020-11-16 PROCEDURE — 82565 ASSAY OF CREATININE: CPT

## 2020-11-16 RX ADMIN — ALBUMIN HUMAN SCH MLS/HR: 0.25 SOLUTION INTRAVENOUS at 15:07

## 2020-11-16 RX ADMIN — ALBUMIN HUMAN SCH: 0.25 SOLUTION INTRAVENOUS at 15:43

## 2020-11-16 RX ADMIN — ALBUMIN HUMAN SCH MLS/HR: 0.25 SOLUTION INTRAVENOUS at 15:36

## 2020-11-16 RX ADMIN — ALBUMIN HUMAN SCH MLS/HR: 0.25 SOLUTION INTRAVENOUS at 15:19

## 2020-11-17 NOTE — US
EXAMINATION TYPE: US paracentesis abd w/image

 

DATE OF EXAM: 11/16/2020

 

COMPARISON: NONE

 

HISTORY: Ascites.

 

PROCEDURE:

Maximal barrier technique was utilized.  The skin overlying a suitable pocket of fluid was localized 
with ultrasound and the overlying skin was prepped and draped.  Ultrasound was utilized with sterile 
technique. Lidocaine was used for local anesthesia and a skin nick made with a scalpel. Catheter was 
advanced under direct ultrasound guidance into a suitable pocket of fluid and approximately 6 liters 
of serous fluid were removed.  Catheter was withdrawn and hemostasis achieved.  There is no immediate
 complication; the patient is discharged in stable condition. 

 

IMPRESSION:  STATUS POST ULTRASOUND GUIDED PARACENTESIS FOR PALLIATION OF ASCITES.  THIS PROCEDURE WA
S PERFORMED BY THE UNDERSIGNED.

## 2020-12-23 ENCOUNTER — HOSPITAL ENCOUNTER (OUTPATIENT)
Dept: HOSPITAL 47 - RADPROMAIN | Age: 75
Discharge: HOME | End: 2020-12-23
Attending: INTERNAL MEDICINE
Payer: MEDICARE

## 2020-12-23 ENCOUNTER — HOSPITAL ENCOUNTER (OUTPATIENT)
Dept: HOSPITAL 47 - EC | Age: 75
Setting detail: OBSERVATION
LOS: 2 days | Discharge: HOME HEALTH | End: 2020-12-25
Attending: INTERNAL MEDICINE | Admitting: INTERNAL MEDICINE
Payer: MEDICARE

## 2020-12-23 VITALS — DIASTOLIC BLOOD PRESSURE: 53 MMHG | HEART RATE: 89 BPM | RESPIRATION RATE: 18 BRPM | SYSTOLIC BLOOD PRESSURE: 91 MMHG

## 2020-12-23 VITALS — TEMPERATURE: 97.7 F

## 2020-12-23 DIAGNOSIS — F32.9: ICD-10-CM

## 2020-12-23 DIAGNOSIS — Z98.890: ICD-10-CM

## 2020-12-23 DIAGNOSIS — I11.0: ICD-10-CM

## 2020-12-23 DIAGNOSIS — I44.0: ICD-10-CM

## 2020-12-23 DIAGNOSIS — R19.8: Primary | ICD-10-CM

## 2020-12-23 DIAGNOSIS — K70.0: ICD-10-CM

## 2020-12-23 DIAGNOSIS — Z83.3: ICD-10-CM

## 2020-12-23 DIAGNOSIS — Z79.899: ICD-10-CM

## 2020-12-23 DIAGNOSIS — R79.89: ICD-10-CM

## 2020-12-23 DIAGNOSIS — Z80.9: ICD-10-CM
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
## 2020-12-23 DIAGNOSIS — I95.9: ICD-10-CM

## 2020-12-23 DIAGNOSIS — E11.9: ICD-10-CM

## 2020-12-23 DIAGNOSIS — E86.0: ICD-10-CM

## 2020-12-23 DIAGNOSIS — E87.6: ICD-10-CM

## 2020-12-23 DIAGNOSIS — Z80.3: ICD-10-CM

## 2020-12-23 DIAGNOSIS — I50.9: ICD-10-CM

## 2020-12-23 DIAGNOSIS — N17.9: ICD-10-CM

## 2020-12-23 DIAGNOSIS — R15.9: ICD-10-CM

## 2020-12-23 DIAGNOSIS — Z79.82: ICD-10-CM

## 2020-12-23 DIAGNOSIS — E78.5: ICD-10-CM

## 2020-12-23 DIAGNOSIS — R19.7: ICD-10-CM

## 2020-12-23 DIAGNOSIS — F17.210: ICD-10-CM

## 2020-12-23 DIAGNOSIS — I45.10: ICD-10-CM

## 2020-12-23 DIAGNOSIS — R53.1: ICD-10-CM

## 2020-12-23 DIAGNOSIS — M54.2: ICD-10-CM

## 2020-12-23 DIAGNOSIS — M19.90: ICD-10-CM

## 2020-12-23 DIAGNOSIS — K70.30: ICD-10-CM

## 2020-12-23 DIAGNOSIS — S09.90XA: ICD-10-CM

## 2020-12-23 DIAGNOSIS — K72.90: Primary | ICD-10-CM

## 2020-12-23 LAB
ALBUMIN SERPL-MCNC: 2.6 G/DL (ref 3.5–5)
ALP SERPL-CCNC: 56 U/L (ref 38–126)
ALT SERPL-CCNC: 7 U/L (ref 4–49)
ANION GAP SERPL CALC-SCNC: 6 MMOL/L
AST SERPL-CCNC: 15 U/L (ref 17–59)
BASOPHILS # BLD AUTO: 0 K/UL (ref 0–0.2)
BASOPHILS NFR BLD AUTO: 0 %
BUN SERPL-SCNC: 31 MG/DL (ref 9–20)
CALCIUM SPEC-MCNC: 7.8 MG/DL (ref 8.4–10.2)
CHLORIDE SERPL-SCNC: 103 MMOL/L (ref 98–107)
CK SERPL-CCNC: 106 U/L (ref 55–170)
CO2 SERPL-SCNC: 27 MMOL/L (ref 22–30)
EOSINOPHIL # BLD AUTO: 0 K/UL (ref 0–0.7)
EOSINOPHIL NFR BLD AUTO: 0 %
ERYTHROCYTE [DISTWIDTH] IN BLOOD BY AUTOMATED COUNT: 3.22 M/UL (ref 4.3–5.9)
ERYTHROCYTE [DISTWIDTH] IN BLOOD: 14.5 % (ref 11.5–15.5)
GLUCOSE SERPL-MCNC: 130 MG/DL (ref 74–99)
GLUCOSE SERPL-MCNC: 147 MG/DL (ref 74–99)
HCT VFR BLD AUTO: 28.2 % (ref 39–53)
HGB BLD-MCNC: 9.3 GM/DL (ref 13–17.5)
INR PPP: 1.1 (ref ?–1.2)
LYMPHOCYTES # SPEC AUTO: 0.9 K/UL (ref 1–4.8)
LYMPHOCYTES NFR SPEC AUTO: 9 %
MAGNESIUM SPEC-SCNC: 1.7 MG/DL (ref 1.6–2.3)
MCH RBC QN AUTO: 28.9 PG (ref 25–35)
MCHC RBC AUTO-ENTMCNC: 33 G/DL (ref 31–37)
MCV RBC AUTO: 87.5 FL (ref 80–100)
MONOCYTES # BLD AUTO: 0.6 K/UL (ref 0–1)
MONOCYTES NFR BLD AUTO: 5 %
NEUTROPHILS # BLD AUTO: 8.7 K/UL (ref 1.3–7.7)
NEUTROPHILS NFR BLD AUTO: 84 %
PLATELET # BLD AUTO: 172 K/UL (ref 150–450)
PLATELET # BLD AUTO: 317 K/UL (ref 150–450)
POTASSIUM SERPL-SCNC: 3.3 MMOL/L (ref 3.5–5.1)
PROT SERPL-MCNC: 5.7 G/DL (ref 6.3–8.2)
PT BLD: 11.1 SEC (ref 9–12)
SODIUM SERPL-SCNC: 136 MMOL/L (ref 137–145)
WBC # BLD AUTO: 10.3 K/UL (ref 3.8–10.6)

## 2020-12-23 PROCEDURE — 85049 AUTOMATED PLATELET COUNT: CPT

## 2020-12-23 PROCEDURE — 84484 ASSAY OF TROPONIN QUANT: CPT

## 2020-12-23 PROCEDURE — 96367 TX/PROPH/DG ADDL SEQ IV INF: CPT

## 2020-12-23 PROCEDURE — 82565 ASSAY OF CREATININE: CPT

## 2020-12-23 PROCEDURE — 87324 CLOSTRIDIUM AG IA: CPT

## 2020-12-23 PROCEDURE — 85025 COMPLETE CBC W/AUTO DIFF WBC: CPT

## 2020-12-23 PROCEDURE — 36415 COLL VENOUS BLD VENIPUNCTURE: CPT

## 2020-12-23 PROCEDURE — 82947 ASSAY GLUCOSE BLOOD QUANT: CPT

## 2020-12-23 PROCEDURE — 96366 THER/PROPH/DIAG IV INF ADDON: CPT

## 2020-12-23 PROCEDURE — 83735 ASSAY OF MAGNESIUM: CPT

## 2020-12-23 PROCEDURE — 72125 CT NECK SPINE W/O DYE: CPT

## 2020-12-23 PROCEDURE — 96361 HYDRATE IV INFUSION ADD-ON: CPT

## 2020-12-23 PROCEDURE — 97162 PT EVAL MOD COMPLEX 30 MIN: CPT

## 2020-12-23 PROCEDURE — 71046 X-RAY EXAM CHEST 2 VIEWS: CPT

## 2020-12-23 PROCEDURE — 93005 ELECTROCARDIOGRAM TRACING: CPT

## 2020-12-23 PROCEDURE — 80053 COMPREHEN METABOLIC PANEL: CPT

## 2020-12-23 PROCEDURE — 70450 CT HEAD/BRAIN W/O DYE: CPT

## 2020-12-23 PROCEDURE — 85610 PROTHROMBIN TIME: CPT

## 2020-12-23 PROCEDURE — 81001 URINALYSIS AUTO W/SCOPE: CPT

## 2020-12-23 PROCEDURE — 49083 ABD PARACENTESIS W/IMAGING: CPT

## 2020-12-23 PROCEDURE — 96365 THER/PROPH/DIAG IV INF INIT: CPT

## 2020-12-23 PROCEDURE — 82550 ASSAY OF CK (CPK): CPT

## 2020-12-23 PROCEDURE — 80048 BASIC METABOLIC PNL TOTAL CA: CPT

## 2020-12-23 PROCEDURE — 99285 EMERGENCY DEPT VISIT HI MDM: CPT

## 2020-12-23 RX ADMIN — ALBUMIN HUMAN SCH MLS/HR: 0.25 SOLUTION INTRAVENOUS at 15:25

## 2020-12-23 RX ADMIN — ALBUMIN HUMAN SCH MLS/HR: 0.25 SOLUTION INTRAVENOUS at 15:33

## 2020-12-23 RX ADMIN — TAMSULOSIN HYDROCHLORIDE SCH MG: 0.4 CAPSULE ORAL at 22:59

## 2020-12-23 RX ADMIN — ALBUMIN HUMAN SCH MLS/HR: 0.25 SOLUTION INTRAVENOUS at 15:45

## 2020-12-23 RX ADMIN — SPIRONOLACTONE SCH MG: 25 TABLET, FILM COATED ORAL at 22:58

## 2020-12-23 RX ADMIN — CEFAZOLIN SCH MLS/HR: 330 INJECTION, POWDER, FOR SOLUTION INTRAMUSCULAR; INTRAVENOUS at 22:59

## 2020-12-23 RX ADMIN — ALBUMIN HUMAN SCH MLS/HR: 0.25 SOLUTION INTRAVENOUS at 15:14

## 2020-12-23 NOTE — CT
EXAMINATION TYPE: CT brain amandaine wo con

 

DATE OF EXAM: 12/23/2020

 

COMPARISON: CT brain 8/17/2020

 

HISTORY: trauma with neck pain

 

CT DLP: 1423 mGycm

Automated exposure control for dose reduction was used.

 

There is cerebral cortical moderate atrophy. There is no mass effect nor midline shift. There is no s
ign of intracranial hemorrhage. There is some enlargement of the ventricles. The calvarium is intact.
 Skull base is intact.

 

Cervical vertebra show some mild straightening. There is degenerative disc space narrowing throughout
 the cervical spine. There is no compression fracture. There is spurring of the endplates. There is m
ild multilevel cervical hypertrophic facet arthropathy. There is uncovertebral spurring and endplate 
spur formation with mild multilevel cervical bony spinal stenosis.

 

IMPRESSION:

Cerebral atrophy. No acute intracranial abnormality. Chronic small vessel ischemia. No change.

 

Multilevel spondylotic cervical changes with mild multilevel cervical bony spinal stenosis. No fractu
re seen.

## 2020-12-23 NOTE — ED
Fall HPI





- General


Chief Complaint: Fall


Stated Complaint: Fall/head injury


Time Seen by Provider: 12/23/20 16:39


Source: patient, family


Mode of arrival: wheelchair





- History of Present Illness


Initial Comments: 





This is a 75-year-old male with a history of liver failure with ascites who was 

brought in by his family for evaluation after falling twice today.  The second 

time he fell he currently hit his head against a toilet now complains of left-

sided neck pain.  No overt loss of consciousness.  Subsequently after this 

happen he had a large


Paracentesis with pressing 8.9 L of fluid removed it.  He does complain of 

feeling weak also left-sided neck pain no overt fevers chills or sweats he has 

had decreased appetite recently.  No other complaints or modifying factors at t

his time.


MD Complaint: fall, other





- Related Data


                                Home Medications











 Medication  Instructions  Recorded  Confirmed


 


Benazepril HCl 40 mg PO QAM 12/03/17 12/23/20


 


Tamsulosin [Flomax] 0.4 mg PO HS 12/04/17 12/23/20


 


Isosorbide Mononitrate ER [Imdur] 30 mg PO QAM 09/05/18 12/23/20


 


Cyanocobalamin (Vitamin B-12) 5,000 mcg PO DAILY 06/26/20 12/23/20





[Vitamin B-12]   


 


Nitroglycerin Sl Tab (0.3mg) 0.3 mg SUBLINGUAL Q5M PRN 06/26/20 12/23/20


 


Furosemide [Lasix] 40 mg PO QAM 07/27/20 12/23/20


 


Spironolactone [Aldactone] 50 mg PO BID 07/27/20 12/23/20


 


Cholecalciferol [Vitamin D3 (25 1,000 unit PO DAILY 08/17/20 12/23/20





Mcg = 1000 Iu)]   


 


Potassium Gluconate 99 mg PO DAILY 08/17/20 12/23/20


 


Ascorbic Acid [Vitamin C] 1,000 mg PO DAILY 11/11/20 12/23/20


 


Aspirin [Adult Low Dose Aspirin EC] 81 mg PO DAILY 11/11/20 12/23/20


 


Ferrous Sulfate [Iron (65  mg PO DAILY 11/11/20 12/23/20





Elemental)]   








                                  Previous Rx's











 Medication  Instructions  Recorded


 


carvediloL [Coreg*] 12.5 mg PO BID-W/MEALS  tab 07/01/20











                                    Allergies











Allergy/AdvReac Type Severity Reaction Status Date / Time


 


No Known Allergies Allergy   Verified 12/23/20 17:03














Review of Systems


ROS Statement: 


Those systems with pertinent positive or pertinent negative responses have been 

documented in the HPI.





ROS Other: All systems not noted in ROS Statement are negative.





Past Medical History


Past Medical History: Chest Pain / Angina, Heart Failure, Diabetes Mellitus, 

Hyperlipidemia, Hypertension, Osteoarthritis (OA)


Additional Past Medical History / Comment(s): incontinent of stools 

intermittently. low iron levels, possible GI bleed-dark tarry stools per son, 

Son stated "has had recent falls related to blood sugar going low 60s" insulin 

was discontinued-HX 2017 and 2018 had episodes of CHF approx 7-10 days post 

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 

disease


History of Any Multi-Drug Resistant Organisms: None Reported


Past Surgical History: Hernia Repair


Additional Past Surgical History / Comment(s): 9/11/18 robot assisted 

laprascopic umbilical hernia repair with mesh., 6/10/19 repair recurrent 

incarerated hernia, paracentesis times 2


Past Anesthesia/Blood Transfusion Reactions: No Reported Reaction


Additional Past Anesthesia/Blood Transfusion Reaction / Comment(s): no previous 

blood transfusion


Past Psychological History: Depression


Smoking Status: Former smoker


Past Alcohol Use History: None Reported


Past Drug Use History: None Reported





- Past Family History


  ** Mother


Family Medical History: Cancer


Additional Family Medical History / Comment(s): breast cancer





  ** Father


Family Medical History: Unable to Obtain





  ** Sister(s)


Family Medical History: Cancer





  ** Brother(s)


Family Medical History: Diabetes Mellitus





General Exam





- General Exam Comments


Initial Comments: 





This is a well-developed asthenic appearing male who is awake alert oriented 3


Limitations: no limitations


General appearance: alert, in no apparent distress


Head exam: Present: atraumatic, normocephalic, normal inspection


Eye exam: Present: normal appearance, PERRL, EOMI.  Absent: scleral icterus, 

conjunctival injection, periorbital swelling


ENT exam: Present: mucous membranes dry


Neck exam: Present: normal inspection, tenderness (Tenderness palpation of left 

paraspinous muscles no definite spinous process tenderness no step-off no 

crepitation.).  Absent: meningismus, lymphadenopathy


Respiratory exam: Present: normal lung sounds bilaterally.  Absent: respiratory 

distress, wheezes, rales, rhonchi, stridor


Cardiovascular Exam: Present: regular rate, normal rhythm, normal heart sounds. 

 Absent: systolic murmur, diastolic murmur, rubs, gallop, clicks


GI/Abdominal exam: Present: soft, normal bowel sounds.  Absent: distended, 

tenderness, guarding, rebound, rigid, bruit, pulsatile mass


Rectal exam: Present: other (Patient did have a small amount of liquid brown 

stool noted)


Extremities exam: Present: normal inspection, full ROM, normal capillary refill.

  Absent: tenderness, pedal edema, joint swelling, calf tenderness


Back exam: Present: normal inspection


Neurological exam: Present: alert, oriented X3, CN II-XII intact


Psychiatric exam: Present: normal affect, normal mood


Skin exam: Present: warm, dry, intact, normal color.  Absent: rash





Course


                                   Vital Signs











  12/23/20 12/23/20 12/23/20





  16:20 16:33 17:00


 


Temperature 99.0 F  


 


Pulse Rate 86 67 


 


Respiratory 20  





Rate   


 


Blood Pressure 92/56  107/52


 


O2 Sat by Pulse 98 93 L 94 L





Oximetry   














  12/23/20 12/23/20 12/23/20





  17:30 18:00 18:30


 


Temperature   


 


Pulse Rate  74 


 


Respiratory  17 





Rate   


 


Blood Pressure 97/49 108/50 96/41


 


O2 Sat by Pulse 97 95 





Oximetry   














  12/23/20





  19:00


 


Temperature 


 


Pulse Rate 


 


Respiratory 18





Rate 


 


Blood Pressure 92/37


 


O2 Sat by Pulse 95





Oximetry 














Medical Decision Making





- Medical Decision Making





I did discuss findings with the patient family patient does demonstrate evidence

 of borderline hypotension with dehydration additionally he does have elevated 

troponin and the background of chronic renal insufficiency.  He will be admitted

 I did discuss the case with Padmini who is covering Dr. Grant





- Lab Data


Result diagrams: 


                                 12/23/20 17:46





                                 12/23/20 17:46


                                   Lab Results











  12/23/20 12/23/20 12/23/20 Range/Units





  17:46 17:46 17:46 


 


WBC  10.3    (3.8-10.6)  k/uL


 


RBC  3.22 L    (4.30-5.90)  m/uL


 


Hgb  9.3 L D    (13.0-17.5)  gm/dL


 


Hct  28.2 L    (39.0-53.0)  %


 


MCV  87.5    (80.0-100.0)  fL


 


MCH  28.9    (25.0-35.0)  pg


 


MCHC  33.0    (31.0-37.0)  g/dL


 


RDW  14.5    (11.5-15.5)  %


 


Plt Count  172    (150-450)  k/uL


 


MPV  7.4    


 


Neutrophils %  84    %


 


Lymphocytes %  9    %


 


Monocytes %  5    %


 


Eosinophils %  0    %


 


Basophils %  0    %


 


Neutrophils #  8.7 H    (1.3-7.7)  k/uL


 


Lymphocytes #  0.9 L    (1.0-4.8)  k/uL


 


Monocytes #  0.6    (0-1.0)  k/uL


 


Eosinophils #  0.0    (0-0.7)  k/uL


 


Basophils #  0.0    (0-0.2)  k/uL


 


Sodium   136 L   (137-145)  mmol/L


 


Potassium   3.3 L   (3.5-5.1)  mmol/L


 


Chloride   103   ()  mmol/L


 


Carbon Dioxide   27   (22-30)  mmol/L


 


Anion Gap   6   mmol/L


 


BUN   31 H   (9-20)  mg/dL


 


Creatinine   1.60 H   (0.66-1.25)  mg/dL


 


Est GFR (CKD-EPI)AfAm   48   (>60 ml/min/1.73 sqM)  


 


Est GFR (CKD-EPI)NonAf   41   (>60 ml/min/1.73 sqM)  


 


Glucose   130 H   (74-99)  mg/dL


 


Calcium   7.8 L   (8.4-10.2)  mg/dL


 


Magnesium   1.7   (1.6-2.3)  mg/dL


 


Total Bilirubin   1.3   (0.2-1.3)  mg/dL


 


AST   15 L   (17-59)  U/L


 


ALT   7   (4-49)  U/L


 


Alkaline Phosphatase   56   ()  U/L


 


Creatine Kinase   106   ()  U/L


 


Troponin I    0.048 H*  (0.000-0.034)  ng/mL


 


Total Protein   5.7 L   (6.3-8.2)  g/dL


 


Albumin   2.6 L   (3.5-5.0)  g/dL














- Radiology Data


Radiology results: report reviewed (I did review the imaging and report no 

evidence of acute findings on CT or x-ray.), image reviewed





Disposition


Clinical Impression: 


 Elevated troponin, Chronic renal insufficiency, Dehydration, Hypotensive 

episode, Chronic liver disease





Disposition: ADMITTED AS IP TO THIS HOSP


Condition: Fair


Referrals: 


Merry Grimaldo MD [Primary Care Provider] - 1-2 days

## 2020-12-23 NOTE — XR
EXAMINATION TYPE: XR chest 2V

 

DATE OF EXAM: 12/23/2020

 

COMPARISON: 8/17/2020

 

HISTORY: Weakness

 

TECHNIQUE:

 

FINDINGS: There is no heart failure nor confluent pneumonic infiltrate. Costophrenic angles are clear
. There are no hilar masses. Bony thorax is intact.

 

IMPRESSION: No active cardiopulmonary disease. Normal heart. No change.

## 2020-12-24 LAB
ANION GAP SERPL CALC-SCNC: 6 MMOL/L
BUN SERPL-SCNC: 33 MG/DL (ref 9–20)
CALCIUM SPEC-MCNC: 7.3 MG/DL (ref 8.4–10.2)
CHLORIDE SERPL-SCNC: 103 MMOL/L (ref 98–107)
CO2 SERPL-SCNC: 27 MMOL/L (ref 22–30)
GLUCOSE SERPL-MCNC: 119 MG/DL (ref 74–99)
HYALINE CASTS UR QL AUTO: 21 /LPF (ref 0–2)
PH UR: 5.5 [PH] (ref 5–8)
POTASSIUM SERPL-SCNC: 3 MMOL/L (ref 3.5–5.1)
PROT UR QL: (no result)
RBC UR QL: <1 /HPF (ref 0–5)
SODIUM SERPL-SCNC: 136 MMOL/L (ref 137–145)
SP GR UR: 1.02 (ref 1–1.03)
SQUAMOUS UR QL AUTO: 2 /HPF (ref 0–4)
UROBILINOGEN UR QL STRIP: 3 MG/DL (ref ?–2)
WBC # UR AUTO: 1 /HPF (ref 0–5)

## 2020-12-24 RX ADMIN — POTASSIUM CHLORIDE SCH MLS/HR: 7.46 INJECTION, SOLUTION INTRAVENOUS at 11:08

## 2020-12-24 RX ADMIN — POTASSIUM CHLORIDE SCH MLS/HR: 7.46 INJECTION, SOLUTION INTRAVENOUS at 15:09

## 2020-12-24 RX ADMIN — POTASSIUM CHLORIDE SCH MEQ: 20 TABLET, EXTENDED RELEASE ORAL at 03:30

## 2020-12-24 RX ADMIN — ISOSORBIDE MONONITRATE SCH MG: 30 TABLET, EXTENDED RELEASE ORAL at 08:06

## 2020-12-24 RX ADMIN — FUROSEMIDE SCH MG: 40 TABLET ORAL at 08:06

## 2020-12-24 RX ADMIN — SPIRONOLACTONE SCH MG: 25 TABLET, FILM COATED ORAL at 20:22

## 2020-12-24 RX ADMIN — TAMSULOSIN HYDROCHLORIDE SCH MG: 0.4 CAPSULE ORAL at 20:22

## 2020-12-24 RX ADMIN — POTASSIUM CHLORIDE SCH MLS/HR: 7.46 INJECTION, SOLUTION INTRAVENOUS at 17:05

## 2020-12-24 RX ADMIN — SPIRONOLACTONE SCH MG: 25 TABLET, FILM COATED ORAL at 08:06

## 2020-12-24 RX ADMIN — Medication SCH: at 09:05

## 2020-12-24 RX ADMIN — OXYCODONE HYDROCHLORIDE AND ACETAMINOPHEN SCH MG: 500 TABLET ORAL at 08:06

## 2020-12-24 RX ADMIN — Medication SCH MG: at 08:06

## 2020-12-24 RX ADMIN — CEFAZOLIN SCH: 330 INJECTION, POWDER, FOR SOLUTION INTRAMUSCULAR; INTRAVENOUS at 08:02

## 2020-12-24 RX ADMIN — CYANOCOBALAMIN TAB 500 MCG SCH MCG: 500 TAB at 08:06

## 2020-12-24 RX ADMIN — POTASSIUM CHLORIDE SCH MEQ: 20 TABLET, EXTENDED RELEASE ORAL at 02:30

## 2020-12-24 RX ADMIN — POTASSIUM CHLORIDE SCH MLS/HR: 7.46 INJECTION, SOLUTION INTRAVENOUS at 13:03

## 2020-12-24 RX ADMIN — ASPIRIN 81 MG CHEWABLE TABLET SCH MG: 81 TABLET CHEWABLE at 08:06

## 2020-12-24 NOTE — P.HPIM
History of Present Illness


75-year-old male with a known history of cirrhosis sinusitis came in after a 

fall patient fell twice did not lose any consciousness.  Patient had a 

paracentesis yesterday.  Patient had mildly elevated troponins of 0.04 although 

these are better than his previous hospital physicians patient appears to have 

chronic kidney disease serum creatinine ranges anywhere between 1.2-1.8.  

Patient had a CT of the neck and the cervical spine which was negative.  Patient

was also having diarrhea which at this point of time resolved.  C. diff was 

ordered but those are not available at this time.  Patient had a paracentesis 

and removal of 8.59 L of fluid removed yesterday.  Patient quit drinking years 

ago after his diagnosis of cirrhosis.  The patient is bit weak.  Patient has 

symptomatic right abnormality this including the hypokalemia.  She and the EKG 

is abnormal but no significant change compared to previous EKG there multiple 

bilateral branch blocks predominantly right bundle-branch block with left axis 

deviation.








Review of Systems








REVIEW OF SYSTEMS: 


CONSTITUTIONAL: No fever, no malaise, no fatigue. 


HEENT: No recent visual problems or hearing problems. Denied any sore throat. 


CARDIOVASCULAR: No chest pain, orthopnea, PND, no palpitations, no syncope. 


PULMONARY: No shortness of breath, no cough, no hemoptysis. 


GASTROINTESTINAL:  no nausea, no vomiting, no abdominal pain. 


NEUROLOGICAL: No headaches, no weakness, no numbness. 


HEMATOLOGICAL: Denies any bleeding or petechiae. 


GENITOURINARY: Denies any burning micturition, frequency, or urgency. 


MUSCULOSKELETAL/RHEUMATOLOGICAL: Denies any joint pain, swelling, or any muscle 

pain. 


ENDOCRINE: Denies any polyuria or polydipsia. 





The rest of the 14-point review of systems is negative.











Past Medical History


Past Medical History: Chest Pain / Angina, Heart Failure, Diabetes Mellitus, 

Hyperlipidemia, Hypertension, Osteoarthritis (OA)


Additional Past Medical History / Comment(s): incontinent of stools 

intermittently. low iron levels, possible GI bleed-dark tarry stools per son, 

Son stated "has had recent falls related to blood sugar going low 60s" insulin 

was discontinued-HX 2017 and 2018 had episodes of CHF approx 7-10 days post 

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 

disease


History of Any Multi-Drug Resistant Organisms: None Reported


Past Surgical History: Hernia Repair


Additional Past Surgical History / Comment(s): 9/11/18 robot assisted laprasc

opic umbilical hernia repair with mesh., 6/10/19 repair recurrent incarerated 

hernia, paracentesis times 2


Past Anesthesia/Blood Transfusion Reactions: No Reported Reaction


Additional Past Anesthesia/Blood Transfusion Reaction / Comment(s): no previous 

blood transfusion


Past Psychological History: Depression


Additional Psychological History / Comment(s): Pt resides with his spouse and 

son.  He states he uses no assistive devices.  He no longer drives, his son does

the driving and manages his medications.


Smoking Status: Current every day smoker


Past Alcohol Use History: Daily


Additional Past Alcohol Use History / Comment(s): started smoking at 4 years 

old, smokes 1 cigaretted a day now. Previous drinker.


Past Drug Use History: None Reported





- Past Family History


  ** Mother


Family Medical History: Cancer


Additional Family Medical History / Comment(s): breast cancer





  ** Father


Family Medical History: Unable to Obtain





  ** Sister(s)


Family Medical History: Cancer





  ** Brother(s)


Family Medical History: Diabetes Mellitus





Medications and Allergies


                                Home Medications











 Medication  Instructions  Recorded  Confirmed  Type


 


Benazepril HCl 40 mg PO QAM 12/03/17 12/23/20 History


 


Tamsulosin [Flomax] 0.4 mg PO HS 12/04/17 12/23/20 History


 


Isosorbide Mononitrate ER [Imdur] 30 mg PO QAM 09/05/18 12/23/20 History


 


Cyanocobalamin (Vitamin B-12) 5,000 mcg PO DAILY 06/26/20 12/23/20 History





[Vitamin B-12]    


 


Nitroglycerin Sl Tab (0.3mg) 0.3 mg SUBLINGUAL Q5M PRN 06/26/20 12/23/20 History


 


carvediloL [Coreg*] 12.5 mg PO BID-W/MEALS  tab 07/01/20 12/23/20 Rx


 


Furosemide [Lasix] 40 mg PO QAM 07/27/20 12/23/20 History


 


Spironolactone [Aldactone] 50 mg PO BID 07/27/20 12/23/20 History


 


Cholecalciferol [Vitamin D3 (25 1,000 unit PO DAILY 08/17/20 12/23/20 History





Mcg = 1000 Iu)]    


 


Potassium Gluconate 99 mg PO DAILY 08/17/20 12/23/20 History


 


Ascorbic Acid [Vitamin C] 1,000 mg PO DAILY 11/11/20 12/23/20 History


 


Aspirin [Adult Low Dose Aspirin EC] 81 mg PO DAILY 11/11/20 12/23/20 History


 


Ferrous Sulfate [Iron (65  mg PO DAILY 11/11/20 12/23/20 History





Elemental)]    








                                    Allergies











Allergy/AdvReac Type Severity Reaction Status Date / Time


 


No Known Allergies Allergy   Verified 12/23/20 17:03














Physical Exam


Vitals: 


                                   Vital Signs











  Temp Pulse Pulse Resp BP BP Pulse Ox


 


 12/24/20 08:00  97.8 F   71  18   107/53  97


 


 12/24/20 03:35  97.9 F   77  18   106/50  100


 


 12/24/20 02:00    81  17   


 


 12/24/20 00:33    81  17   104/50  98


 


 12/24/20 00:22   72   18  98/62   96


 


 12/23/20 23:00   83   18  97/52   97


 


 12/23/20 21:30      107/63   95


 


 12/23/20 21:00      118/66  


 


 12/23/20 20:30      100/51   95


 


 12/23/20 20:00      101/44  


 


 12/23/20 19:30      90/52   93 L


 


 12/23/20 19:00     18  92/37   95


 


 12/23/20 18:30      96/41  


 


 12/23/20 18:00   74   17  108/50   95


 


 12/23/20 17:30      97/49   97


 


 12/23/20 17:00      107/52   94 L


 


 12/23/20 16:33   67      93 L


 


 12/23/20 16:20  99.0 F  86   20  92/56   98








                                Intake and Output











 12/23/20 12/24/20 12/24/20





 22:59 06:59 14:59


 


Other:   


 


  Voiding Method  Urinal Urinal





  Diaper Diaper





  Incontinent Incontinent


 


  Weight 97.976 kg 83 kg 

















PHYSICAL EXAMINATION: 





GENERAL: The patient is alert and oriented x3, not in any acute distress. Well 

developed, well nourished. 


HEENT: Pupils are round and equally reacting to light. EOMI. No scleral icterus.

No conjunctival pallor. Normocephalic, atraumatic. No pharyngeal erythema. No 

thyromegaly. 


CARDIOVASCULAR: S1 and S2 present. No murmurs, rubs, or gallops. 


PULMONARY: Chest is clear to auscultation, no wheezing or crackles. 


ABDOMEN: Distended ascites fluid shift does have bowel sounds.


MUSCULOSKELETAL: No joint swelling or deformity.


EXTREMITIES: No cyanosis, clubbing, or pedal edema. 


NEUROLOGICAL: Gross neurological examination did not reveal any focal deficits. 


SKIN: No rashes. 

















Results


CBC & Chem 7: 


                                 12/23/20 17:46





                                 12/24/20 05:51


Labs: 


                  Abnormal Lab Results - Last 24 Hours (Table)











  12/23/20 12/23/20 12/23/20 Range/Units





  17:46 17:46 17:46 


 


RBC  3.22 L    (4.30-5.90)  m/uL


 


Hgb  9.3 L D    (13.0-17.5)  gm/dL


 


Hct  28.2 L    (39.0-53.0)  %


 


Neutrophils #  8.7 H    (1.3-7.7)  k/uL


 


Lymphocytes #  0.9 L    (1.0-4.8)  k/uL


 


Sodium   136 L   (137-145)  mmol/L


 


Potassium   3.3 L   (3.5-5.1)  mmol/L


 


BUN   31 H   (9-20)  mg/dL


 


Creatinine   1.60 H   (0.66-1.25)  mg/dL


 


Glucose   130 H   (74-99)  mg/dL


 


Calcium   7.8 L   (8.4-10.2)  mg/dL


 


AST   15 L   (17-59)  U/L


 


Troponin I    0.048 H*  (0.000-0.034)  ng/mL


 


Total Protein   5.7 L   (6.3-8.2)  g/dL


 


Albumin   2.6 L   (3.5-5.0)  g/dL


 


Urine Protein     (Negative)  


 


Urine Bacteria     (None)  /hpf


 


Hyaline Casts     (0-2)  /lpf


 


Urine Mucus     (None)  /hpf














  12/23/20 12/23/20 12/24/20 Range/Units





  21:46 23:00 00:55 


 


RBC     (4.30-5.90)  m/uL


 


Hgb     (13.0-17.5)  gm/dL


 


Hct     (39.0-53.0)  %


 


Neutrophils #     (1.3-7.7)  k/uL


 


Lymphocytes #     (1.0-4.8)  k/uL


 


Sodium     (137-145)  mmol/L


 


Potassium     (3.5-5.1)  mmol/L


 


BUN     (9-20)  mg/dL


 


Creatinine     (0.66-1.25)  mg/dL


 


Glucose     (74-99)  mg/dL


 


Calcium     (8.4-10.2)  mg/dL


 


AST     (17-59)  U/L


 


Troponin I  0.044 H*   0.044 H*  (0.000-0.034)  ng/mL


 


Total Protein     (6.3-8.2)  g/dL


 


Albumin     (3.5-5.0)  g/dL


 


Urine Protein   1+ H   (Negative)  


 


Urine Bacteria   Rare H   (None)  /hpf


 


Hyaline Casts   21 H   (0-2)  /lpf


 


Urine Mucus   Rare H   (None)  /hpf














  12/24/20 Range/Units





  05:51 


 


RBC   (4.30-5.90)  m/uL


 


Hgb   (13.0-17.5)  gm/dL


 


Hct   (39.0-53.0)  %


 


Neutrophils #   (1.3-7.7)  k/uL


 


Lymphocytes #   (1.0-4.8)  k/uL


 


Sodium  136 L  (137-145)  mmol/L


 


Potassium  3.0 L  (3.5-5.1)  mmol/L


 


BUN  33 H  (9-20)  mg/dL


 


Creatinine  1.56 H  (0.66-1.25)  mg/dL


 


Glucose  119 H  (74-99)  mg/dL


 


Calcium  7.3 L  (8.4-10.2)  mg/dL


 


AST   (17-59)  U/L


 


Troponin I   (0.000-0.034)  ng/mL


 


Total Protein   (6.3-8.2)  g/dL


 


Albumin   (3.5-5.0)  g/dL


 


Urine Protein   (Negative)  


 


Urine Bacteria   (None)  /hpf


 


Hyaline Casts   (0-2)  /lpf


 


Urine Mucus   (None)  /hpf














Thrombosis Risk Factor Assmnt





- Choose All That Apply


Any of the Below Risk Factors Present?: Yes


Each Factor Represents 1 point: Heart failure (<1month)


Other Risk Factors: Yes


Each Risk Factor Represents 3 Points: Age 75 years or older


Thrombosis Risk Factor Assessment Total Risk Factor Score: 4


Thrombosis Risk Factor Assessment Level: Moderate Risk





Assessment and Plan


Plan: 


-Falls and generalized weakness probably secondary to deconditioning and muscle 

loss from cirrhosis.  Patient had a paracentesis yesterday may be bit 

hypotensive that the could've contributed to his fall.  Patient was given IV f

luids which were discontinued because of his volume overload status because of 

his cirrhosis.  Physical therapy and occupational therapy evaluation.


-Mildly elevated troponin secondary to chronic kidney disease no chest pain no 

significant EKG changes compared to old EKGs no further evaluation is necessary 

and the troponins are stable at the same level around 0.4.


- diarrhea: Resolved etiology is unknown


-Chronic kidney disease stage at 2-3 etiology unknown at this time.


-Hypokalemia potassium will be supplemented


Hyperlipidemia


-Hypertension next and have not called cirrhosis: IV fluids risk and urine 

patient will be resumed on her home dose of Lasix and the Aldactone 


nicotine use: Counseling was provided patient only smokes 1 cigarette per day 

now.

## 2020-12-25 VITALS
RESPIRATION RATE: 20 BRPM | DIASTOLIC BLOOD PRESSURE: 61 MMHG | HEART RATE: 67 BPM | SYSTOLIC BLOOD PRESSURE: 106 MMHG | TEMPERATURE: 97.9 F

## 2020-12-25 LAB
ALBUMIN SERPL-MCNC: 1.9 G/DL (ref 3.5–5)
ALP SERPL-CCNC: 69 U/L (ref 38–126)
ALT SERPL-CCNC: 7 U/L (ref 4–49)
ANION GAP SERPL CALC-SCNC: 3 MMOL/L
AST SERPL-CCNC: 13 U/L (ref 17–59)
BUN SERPL-SCNC: 44 MG/DL (ref 9–20)
CALCIUM SPEC-MCNC: 7.1 MG/DL (ref 8.4–10.2)
CHLORIDE SERPL-SCNC: 106 MMOL/L (ref 98–107)
CO2 SERPL-SCNC: 23 MMOL/L (ref 22–30)
GLUCOSE SERPL-MCNC: 171 MG/DL (ref 74–99)
POTASSIUM SERPL-SCNC: 3.6 MMOL/L (ref 3.5–5.1)
PROT SERPL-MCNC: 4.6 G/DL (ref 6.3–8.2)
SODIUM SERPL-SCNC: 132 MMOL/L (ref 137–145)

## 2020-12-25 RX ADMIN — Medication SCH MG: at 09:08

## 2020-12-25 RX ADMIN — CYANOCOBALAMIN TAB 500 MCG SCH MCG: 500 TAB at 09:08

## 2020-12-25 RX ADMIN — FUROSEMIDE SCH MG: 40 TABLET ORAL at 09:09

## 2020-12-25 RX ADMIN — SPIRONOLACTONE SCH MG: 25 TABLET, FILM COATED ORAL at 09:09

## 2020-12-25 RX ADMIN — OXYCODONE HYDROCHLORIDE AND ACETAMINOPHEN SCH MG: 500 TABLET ORAL at 09:09

## 2020-12-25 RX ADMIN — Medication SCH UNIT: at 09:09

## 2020-12-25 RX ADMIN — ISOSORBIDE MONONITRATE SCH MG: 30 TABLET, EXTENDED RELEASE ORAL at 09:09

## 2020-12-25 RX ADMIN — ASPIRIN 81 MG CHEWABLE TABLET SCH MG: 81 TABLET CHEWABLE at 09:08

## 2020-12-25 NOTE — P.DS
Providers


Date of admission: 


12/23/20 19:45





Attending physician: 


Abel Grant





Primary care physician: 


Merry [facility] Course: 





75-year-old male with a known history of cirrhosis sinusitis came in after a 

fall patient fell twice did not lose any consciousness.  Patient had a 

paracentesis yesterday.  Patient had mildly elevated troponins of 0.04 although 

these are better than his previous hospital physicians patient appears to have 

chronic kidney disease serum creatinine ranges anywhere between 1.2-1.8.  

Patient had a CT of the neck and the cervical spine which was negative.  Patient

was also having diarrhea which at this point of time resolved.  C. diff was 

ordered but those are not available at this time.  Patient had a paracentesis 

and removal of 8.59 L of fluid removed yesterday.  Patient quit drinking years 

ago after his diagnosis of cirrhosis.  The patient is bit weak.  Patient has 

symptomatic right abnormality this including the hypokalemia.  She and the EKG 

is abnormal but no significant change compared to previous EKG there multiple 

bilateral branch blocks predominantly right bundle-branch block with left axis 

deviation.





12/25/2020


Patient was evaluated by physical therapy initially they recommended subacute 

rehabilitation.  Patient didn't feel that he needs to go to subacute rehab 

wanted to go home because of which asked physical therapy to reevaluate the 

patient although physical therapy declined reevaluation as per the nursing staff

patient is independent walking to the bathroom and the nursing staff doesn't 

believe patient will require placement in subacute rehabitation either.  

Although patient creatinine went up along with the sodium that went down because

of which I'll hold off on the diuretics for couple days although patient will 

need these diuretics because of his cirrhosis and also order repeat labs to be 

done in 3 days and results to be faxed to PCP.  Patient is high risk for 

readmission because of his the cirrhosis and overall multiple medical problems.





PHYSICAL EXAMINATION: 





GENERAL: The patient is alert and oriented x3, not in any acute distress. Well 

developed, well nourished. 


HEENT: Pupils are round and equally reacting to light. EOMI. No scleral icterus.

No conjunctival pallor. Normocephalic, atraumatic. No pharyngeal erythema. No 

thyromegaly. 


CARDIOVASCULAR: S1 and S2 present. No murmurs, rubs, or gallops. 


PULMONARY: Chest is clear to auscultation, no wheezing or crackles. 


ABDOMEN: Distended ascites fluid shift does have bowel sounds.


MUSCULOSKELETAL: No joint swelling or deformity.


EXTREMITIES: No cyanosis, clubbing, or pedal edema. 


NEUROLOGICAL: Gross neurological examination did not reveal any focal deficits. 


SKIN: No rashes. 








Assessment and Plan


Plan: 


-Falls and generalized weakness probably secondary to deconditioning and muscle 

loss from cirrhosis.  Patient had a paracentesis yesterday may be bit 

hypotensive that the could've contributed to his fall.  Physical therapy and 

occupational therapy evaluation as mentioned above


-Mildly elevated troponin secondary to chronic kidney disease no chest pain no 

significant EKG changes compared to old EKGs no further evaluation is necessary 

and the troponins are stable at the same level around 0.4.


- diarrhea: Resolved etiology is unknown


-Chronic kidney disease stage at 2-3 etiology unknown at this time.


Abdomen mild acute renal failure expected to improve with holding of diuretics 

for couple days


-Hypokalemia potassium will be supplemented


Hyperlipidemia


-Hypertension 


Alcoholic cirrhosis.


nicotine use: Counseling was provided patient only smokes 1 cigarette per day 

now.





Patient Condition at Discharge: Fair





Plan - Discharge Summary


New Discharge Prescriptions: 


Continue


   Tamsulosin [Flomax] 0.4 mg PO HS


   Isosorbide Mononitrate ER [Imdur] 30 mg PO QAM


   Nitroglycerin Sl Tab (0.3mg) 0.3 mg SUBLINGUAL Q5M PRN


     PRN Reason: Chest Pain


   Cyanocobalamin (Vitamin B-12) [Vitamin B-12] 5,000 mcg PO DAILY


   carvediloL [Coreg*] 12.5 mg PO BID-W/MEALS  tab


   Cholecalciferol [Vitamin D3 (25 Mcg = 1000 Iu)] 1,000 unit PO DAILY


   Ferrous Sulfate [Iron (65 MG Elemental)] 650 mg PO DAILY


   Aspirin [Adult Low Dose Aspirin EC] 81 mg PO DAILY


   Ascorbic Acid [Vitamin C] 1,000 mg PO DAILY


   Spironolactone [Aldactone] 50 mg PO BID #0


   Furosemide [Lasix] 40 mg PO QAM #0


   Potassium Gluconate 99 mg PO DAILY #0





Discontinued


   Benazepril HCl 40 mg PO QAM


Discharge Medication List





Tamsulosin [Flomax] 0.4 mg PO HS 12/04/17 [History]


Isosorbide Mononitrate ER [Imdur] 30 mg PO QAM 09/05/18 [History]


Cyanocobalamin (Vitamin B-12) [Vitamin B-12] 5,000 mcg PO DAILY 06/26/20 

[History]


Nitroglycerin Sl Tab (0.3mg) 0.3 mg SUBLINGUAL Q5M PRN 06/26/20 [History]


carvediloL [Coreg*] 12.5 mg PO BID-W/MEALS  tab 07/01/20 [Rx]


Cholecalciferol [Vitamin D3 (25 Mcg = 1000 Iu)] 1,000 unit PO DAILY 08/17/20 

[History]


Ascorbic Acid [Vitamin C] 1,000 mg PO DAILY 11/11/20 [History]


Aspirin [Adult Low Dose Aspirin EC] 81 mg PO DAILY 11/11/20 [History]


Ferrous Sulfate [Iron (65 MG Elemental)] 650 mg PO DAILY 11/11/20 [History]


Furosemide [Lasix] 40 mg PO QAM #0 12/25/20 [Rx]


Potassium Gluconate 99 mg PO DAILY #0 12/25/20 [Rx]


Spironolactone [Aldactone] 50 mg PO BID #0 12/25/20 [Rx]








Follow up Appointment(s)/Referral(s): 


Merry Grimaldo MD [Primary Care Provider] - 3 Days


Discharge Disposition: HOME WITH HOME HEALTH SERVICES

## 2020-12-27 LAB
GLUCOSE BLD-MCNC: 149 MG/DL (ref 75–99)
GLUCOSE BLD-MCNC: 172 MG/DL (ref 75–99)
GLUCOSE BLD-MCNC: 175 MG/DL (ref 75–99)
GLUCOSE BLD-MCNC: 197 MG/DL (ref 75–99)
GLUCOSE BLD-MCNC: 216 MG/DL (ref 75–99)

## 2020-12-31 NOTE — US
Ultrasound-guided paracentesis.

 

DATE OF EXAM: 12/23/2020

 

CLINICAL HISTORY:  Ascites

 

The procedure was discussed with the patient. The risks, complications, benefits, and alternatives we
re discussed and any questions were answered. Informed consent was obtained. The patient was placed s
upine on the ultrasound table and prepped and draped in the usual sterile fashion. 

All elements of maximal barrier technique were utilized.  Under ultrasound guidance, access into the 
left lower quadrant was obtained, via the paracentesis catheter system and direct ultrasound guidance
.

 

Approximately 8.9 liters of straw-colored fluid was removed. The patient was stable throughout the pr
ocedure and remained stable upon discharge from Department of Radiology.

 

IMPRESSION: 

Successful  paracentesis under ultrasound guidance.

## 2021-01-15 ENCOUNTER — HOSPITAL ENCOUNTER (OUTPATIENT)
Dept: HOSPITAL 47 - RADPROMAIN | Age: 76
Discharge: HOME | End: 2021-01-15
Attending: INTERNAL MEDICINE
Payer: MEDICARE

## 2021-01-15 VITALS — HEART RATE: 88 BPM | SYSTOLIC BLOOD PRESSURE: 129 MMHG | DIASTOLIC BLOOD PRESSURE: 62 MMHG

## 2021-01-15 VITALS — RESPIRATION RATE: 18 BRPM | TEMPERATURE: 98.3 F

## 2021-01-15 DIAGNOSIS — R18.8: Primary | ICD-10-CM

## 2021-01-15 LAB
ANION GAP SERPL CALC-SCNC: 5 MMOL/L
BUN SERPL-SCNC: 21 MG/DL (ref 9–20)
CALCIUM SPEC-MCNC: 7.9 MG/DL (ref 8.4–10.2)
CHLORIDE SERPL-SCNC: 109 MMOL/L (ref 98–107)
CO2 SERPL-SCNC: 24 MMOL/L (ref 22–30)
GLUCOSE SERPL-MCNC: 112 MG/DL (ref 74–99)
INR PPP: 1.1 (ref ?–1.2)
PLATELET # BLD AUTO: 257 K/UL (ref 150–450)
POTASSIUM SERPL-SCNC: 3.6 MMOL/L (ref 3.5–5.1)
PT BLD: 11.2 SEC (ref 9–12)
SODIUM SERPL-SCNC: 138 MMOL/L (ref 137–145)

## 2021-01-15 PROCEDURE — 80048 BASIC METABOLIC PNL TOTAL CA: CPT

## 2021-01-15 PROCEDURE — 49083 ABD PARACENTESIS W/IMAGING: CPT

## 2021-01-15 PROCEDURE — 85049 AUTOMATED PLATELET COUNT: CPT

## 2021-01-15 PROCEDURE — 85610 PROTHROMBIN TIME: CPT

## 2021-01-15 PROCEDURE — 36415 COLL VENOUS BLD VENIPUNCTURE: CPT

## 2021-01-15 RX ADMIN — ALBUMIN HUMAN SCH MLS/HR: 0.25 SOLUTION INTRAVENOUS at 11:59

## 2021-01-15 RX ADMIN — ALBUMIN HUMAN SCH MLS/HR: 0.25 SOLUTION INTRAVENOUS at 11:43

## 2021-01-15 RX ADMIN — ALBUMIN HUMAN SCH MLS/HR: 0.25 SOLUTION INTRAVENOUS at 11:16

## 2021-01-15 RX ADMIN — ALBUMIN HUMAN SCH MLS/HR: 0.25 SOLUTION INTRAVENOUS at 11:29

## 2021-01-15 NOTE — US
EXAMINATION TYPE: US paracentesis abd w/image

 

DATE OF EXAM: 1/15/2021

 

COMPARISON: NONE

 

HISTORY: Ascites.

 

PROCEDURE:

Maximal barrier technique was utilized.  The skin overlying a suitable pocket of fluid was localized 
with ultrasound and the overlying skin was prepped and draped.  Ultrasound was utilized with sterile 
technique. Lidocaine was used for local anesthesia and a skin nick made with a scalpel. Catheter was 
advanced under direct ultrasound guidance into a suitable pocket of fluid and approximately 11.2 lite
rs of serous fluid were removed.  Catheter was withdrawn and hemostasis achieved.  There is no immedi
ate complication; the patient is discharged in stable condition. 

 

IMPRESSION:  STATUS POST ULTRASOUND GUIDED PARACENTESIS FOR PALLIATION OF ASCITES.  THIS PROCEDURE WA
S PERFORMED BY THE UNDERSIGNED.

## 2021-02-03 ENCOUNTER — HOSPITAL ENCOUNTER (OUTPATIENT)
Dept: HOSPITAL 47 - RADPROMAIN | Age: 76
Discharge: HOME | End: 2021-02-03
Attending: INTERNAL MEDICINE
Payer: MEDICARE

## 2021-02-03 VITALS — SYSTOLIC BLOOD PRESSURE: 135 MMHG | HEART RATE: 76 BPM | DIASTOLIC BLOOD PRESSURE: 62 MMHG

## 2021-02-03 VITALS — RESPIRATION RATE: 16 BRPM

## 2021-02-03 VITALS — TEMPERATURE: 97.7 F

## 2021-02-03 DIAGNOSIS — R18.8: Primary | ICD-10-CM

## 2021-02-03 LAB
GLUCOSE SERPL-MCNC: 124 MG/DL (ref 74–99)
INR PPP: 1.1 (ref ?–1.2)
PLATELET # BLD AUTO: 244 K/UL (ref 150–450)
PT BLD: 11.4 SEC (ref 9–12)

## 2021-02-03 PROCEDURE — 49083 ABD PARACENTESIS W/IMAGING: CPT

## 2021-02-03 PROCEDURE — 85610 PROTHROMBIN TIME: CPT

## 2021-02-03 PROCEDURE — 85049 AUTOMATED PLATELET COUNT: CPT

## 2021-02-03 PROCEDURE — 82947 ASSAY GLUCOSE BLOOD QUANT: CPT

## 2021-02-03 PROCEDURE — 82565 ASSAY OF CREATININE: CPT

## 2021-02-03 PROCEDURE — 36415 COLL VENOUS BLD VENIPUNCTURE: CPT

## 2021-02-03 RX ADMIN — ALBUMIN HUMAN SCH MLS/HR: 0.25 SOLUTION INTRAVENOUS at 13:35

## 2021-02-03 RX ADMIN — ALBUMIN HUMAN SCH MLS/HR: 0.25 SOLUTION INTRAVENOUS at 12:54

## 2021-02-03 RX ADMIN — ALBUMIN HUMAN SCH MLS/HR: 0.25 SOLUTION INTRAVENOUS at 14:32

## 2021-02-03 RX ADMIN — ALBUMIN HUMAN SCH MLS/HR: 0.25 SOLUTION INTRAVENOUS at 14:47

## 2021-02-03 NOTE — US
EXAMINATION TYPE: US paracentesis abd w/image

 

DATE OF EXAM: 2/3/2021

 

COMPARISON: NONE

 

HISTORY: Ascites.

 

PROCEDURE:

Maximal barrier technique was utilized.  The skin overlying a suitable pocket of fluid was localized 
with ultrasound and the overlying skin was prepped and draped.  Ultrasound was utilized with sterile 
technique. Lidocaine was used for local anesthesia and a skin nick made with a scalpel. Catheter was 
advanced under direct ultrasound guidance into a suitable pocket of fluid and approximately 11.4 lite
rs of serous fluid were removed.  Catheter was withdrawn and hemostasis achieved.  There is no immedi
ate complication; the patient is discharged in stable condition. 

 

IMPRESSION:  STATUS POST ULTRASOUND GUIDED PARACENTESIS FOR PALLIATION OF ASCITES.  THIS PROCEDURE WA
S PERFORMED BY THE UNDERSIGNED.

## 2021-02-17 ENCOUNTER — HOSPITAL ENCOUNTER (OUTPATIENT)
Dept: HOSPITAL 47 - RADPROMAIN | Age: 76
Discharge: HOME | End: 2021-02-17
Attending: INTERNAL MEDICINE
Payer: MEDICARE

## 2021-02-17 VITALS — RESPIRATION RATE: 16 BRPM | DIASTOLIC BLOOD PRESSURE: 63 MMHG | SYSTOLIC BLOOD PRESSURE: 118 MMHG | HEART RATE: 81 BPM

## 2021-02-17 VITALS — TEMPERATURE: 98 F

## 2021-02-17 DIAGNOSIS — R18.8: Primary | ICD-10-CM

## 2021-02-17 LAB
GLUCOSE SERPL-MCNC: 106 MG/DL (ref 74–99)
INR PPP: 1.1 (ref ?–1.2)
PLATELET # BLD AUTO: 245 K/UL (ref 150–450)
PT BLD: 11.9 SEC (ref 9–12)

## 2021-02-17 PROCEDURE — 36415 COLL VENOUS BLD VENIPUNCTURE: CPT

## 2021-02-17 PROCEDURE — 85049 AUTOMATED PLATELET COUNT: CPT

## 2021-02-17 PROCEDURE — 49083 ABD PARACENTESIS W/IMAGING: CPT

## 2021-02-17 PROCEDURE — 82565 ASSAY OF CREATININE: CPT

## 2021-02-17 PROCEDURE — 82947 ASSAY GLUCOSE BLOOD QUANT: CPT

## 2021-02-17 PROCEDURE — 85610 PROTHROMBIN TIME: CPT

## 2021-02-17 RX ADMIN — ALBUMIN HUMAN SCH MLS/HR: 0.25 SOLUTION INTRAVENOUS at 13:52

## 2021-02-17 RX ADMIN — ALBUMIN HUMAN SCH MLS/HR: 0.25 SOLUTION INTRAVENOUS at 14:09

## 2021-02-17 RX ADMIN — ALBUMIN HUMAN SCH MLS/HR: 0.25 SOLUTION INTRAVENOUS at 14:24

## 2021-02-17 RX ADMIN — ALBUMIN HUMAN SCH MLS/HR: 0.25 SOLUTION INTRAVENOUS at 13:41

## 2021-02-17 NOTE — US
Ultrasound-guided paracentesis.

 

DATE OF EXAM: 2/17/2021

 

CLINICAL HISTORY:  Ascites

 

The procedure was discussed with the patient. The risks, complications, benefits, and alternatives we
re discussed and any questions were answered. Informed consent was obtained. The patient was placed s
upine on the ultrasound table and prepped and draped in the usual sterile fashion. 

All elements of maximal barrier technique were utilized.  Under ultrasound guidance, access into the 
right lower quadrant was obtained, via the paracentesis catheter system and direct ultrasound guidanc
e.

 

Approximately 11.4 liters of straw-colored fluid was removed. The patient was stable throughout the p
rocedure and remained stable upon discharge from Department of Radiology.

 

IMPRESSION: 

Successful  paracentesis under ultrasound guidance.

## 2021-03-01 ENCOUNTER — HOSPITAL ENCOUNTER (OUTPATIENT)
Dept: HOSPITAL 47 - RADPROMAIN | Age: 76
Discharge: HOME | End: 2021-03-01
Attending: INTERNAL MEDICINE
Payer: MEDICARE

## 2021-03-01 VITALS — RESPIRATION RATE: 16 BRPM | TEMPERATURE: 97.8 F

## 2021-03-01 VITALS — HEART RATE: 68 BPM | SYSTOLIC BLOOD PRESSURE: 99 MMHG | DIASTOLIC BLOOD PRESSURE: 62 MMHG

## 2021-03-01 DIAGNOSIS — R18.8: Primary | ICD-10-CM

## 2021-03-01 LAB
GLUCOSE SERPL-MCNC: 142 MG/DL (ref 74–99)
INR PPP: 1.1 (ref ?–1.2)
PLATELET # BLD AUTO: 238 K/UL (ref 150–450)
PT BLD: 11.2 SEC (ref 9–12)

## 2021-03-01 PROCEDURE — 82565 ASSAY OF CREATININE: CPT

## 2021-03-01 PROCEDURE — 85610 PROTHROMBIN TIME: CPT

## 2021-03-01 PROCEDURE — 49083 ABD PARACENTESIS W/IMAGING: CPT

## 2021-03-01 PROCEDURE — 85049 AUTOMATED PLATELET COUNT: CPT

## 2021-03-01 PROCEDURE — 82947 ASSAY GLUCOSE BLOOD QUANT: CPT

## 2021-03-01 RX ADMIN — ALBUMIN HUMAN SCH MLS/HR: 0.25 SOLUTION INTRAVENOUS at 13:30

## 2021-03-01 RX ADMIN — ALBUMIN HUMAN SCH: 0.25 SOLUTION INTRAVENOUS at 15:37

## 2021-03-01 RX ADMIN — ALBUMIN HUMAN SCH MLS/HR: 0.25 SOLUTION INTRAVENOUS at 13:46

## 2021-03-01 RX ADMIN — ALBUMIN HUMAN SCH MLS/HR: 0.25 SOLUTION INTRAVENOUS at 14:03

## 2021-03-01 NOTE — US
EXAMINATION TYPE: US paracentesis abd w/image

 

DATE OF EXAM: 3/1/2021

 

COMPARISON: NONE

 

HISTORY: Ascites.

 

PROCEDURE:

Maximal barrier technique was utilized.  The skin overlying a suitable pocket of fluid was localized 
with ultrasound and the overlying skin was prepped and draped.  Ultrasound was utilized with sterile 
technique. Lidocaine was used for local anesthesia and a skin nick made with a scalpel. Catheter was 
advanced under direct ultrasound guidance into a suitable pocket of fluid and approximately 9.3 liter
s of serous fluid were removed.  Catheter was withdrawn and hemostasis achieved.  There is no immedia
te complication; the patient is discharged in stable condition. 

 

IMPRESSION:  STATUS POST ULTRASOUND GUIDED PARACENTESIS FOR PALLIATION OF ASCITES.  THIS PROCEDURE WA
S PERFORMED BY THE UNDERSIGNED.

## 2021-03-12 ENCOUNTER — HOSPITAL ENCOUNTER (OUTPATIENT)
Dept: HOSPITAL 47 - RADPROMAIN | Age: 76
Discharge: HOME | End: 2021-03-12
Attending: INTERNAL MEDICINE
Payer: MEDICARE

## 2021-03-12 ENCOUNTER — HOSPITAL ENCOUNTER (EMERGENCY)
Dept: HOSPITAL 47 - EC | Age: 76
LOS: 1 days | Discharge: HOME | End: 2021-03-13
Payer: MEDICARE

## 2021-03-12 VITALS — TEMPERATURE: 98.1 F | RESPIRATION RATE: 16 BRPM

## 2021-03-12 VITALS — HEART RATE: 76 BPM | DIASTOLIC BLOOD PRESSURE: 54 MMHG | SYSTOLIC BLOOD PRESSURE: 93 MMHG

## 2021-03-12 DIAGNOSIS — M19.90: ICD-10-CM

## 2021-03-12 DIAGNOSIS — E78.5: ICD-10-CM

## 2021-03-12 DIAGNOSIS — R18.8: Primary | ICD-10-CM

## 2021-03-12 DIAGNOSIS — Z79.82: ICD-10-CM

## 2021-03-12 DIAGNOSIS — I50.9: ICD-10-CM

## 2021-03-12 DIAGNOSIS — I11.0: ICD-10-CM

## 2021-03-12 DIAGNOSIS — R53.1: Primary | ICD-10-CM

## 2021-03-12 DIAGNOSIS — E11.9: ICD-10-CM

## 2021-03-12 LAB
ALBUMIN SERPL-MCNC: 2.3 G/DL (ref 3.5–5)
ALP SERPL-CCNC: 82 U/L (ref 38–126)
ALT SERPL-CCNC: 6 U/L (ref 4–49)
ANION GAP SERPL CALC-SCNC: 6 MMOL/L
APTT BLD: 27.4 SEC (ref 22–30)
AST SERPL-CCNC: 12 U/L (ref 17–59)
BASOPHILS # BLD AUTO: 0 K/UL (ref 0–0.2)
BASOPHILS NFR BLD AUTO: 1 %
BUN SERPL-SCNC: 35 MG/DL (ref 9–20)
CALCIUM SPEC-MCNC: 7.6 MG/DL (ref 8.4–10.2)
CHLORIDE SERPL-SCNC: 103 MMOL/L (ref 98–107)
CO2 SERPL-SCNC: 24 MMOL/L (ref 22–30)
EOSINOPHIL # BLD AUTO: 0 K/UL (ref 0–0.7)
EOSINOPHIL NFR BLD AUTO: 1 %
ERYTHROCYTE [DISTWIDTH] IN BLOOD BY AUTOMATED COUNT: 3 M/UL (ref 4.3–5.9)
ERYTHROCYTE [DISTWIDTH] IN BLOOD: 14.1 % (ref 11.5–15.5)
GLUCOSE SERPL-MCNC: 107 MG/DL (ref 74–99)
GLUCOSE SERPL-MCNC: 121 MG/DL (ref 74–99)
HCT VFR BLD AUTO: 26.5 % (ref 39–53)
HGB BLD-MCNC: 9 GM/DL (ref 13–17.5)
INR PPP: 1.1 (ref ?–1.2)
INR PPP: 1.1 (ref ?–1.2)
LYMPHOCYTES # SPEC AUTO: 0.5 K/UL (ref 1–4.8)
LYMPHOCYTES NFR SPEC AUTO: 12 %
MAGNESIUM SPEC-SCNC: 1.8 MG/DL (ref 1.6–2.3)
MCH RBC QN AUTO: 30.1 PG (ref 25–35)
MCHC RBC AUTO-ENTMCNC: 34.2 G/DL (ref 31–37)
MCV RBC AUTO: 88.1 FL (ref 80–100)
MONOCYTES # BLD AUTO: 0.2 K/UL (ref 0–1)
MONOCYTES NFR BLD AUTO: 6 %
NEUTROPHILS # BLD AUTO: 3.2 K/UL (ref 1.3–7.7)
NEUTROPHILS NFR BLD AUTO: 79 %
PH UR: 5 [PH] (ref 5–8)
PLATELET # BLD AUTO: 170 K/UL (ref 150–450)
PLATELET # BLD AUTO: 248 K/UL (ref 150–450)
POTASSIUM SERPL-SCNC: 4.4 MMOL/L (ref 3.5–5.1)
PROT SERPL-MCNC: 4.8 G/DL (ref 6.3–8.2)
PT BLD: 11.2 SEC (ref 9–12)
PT BLD: 11.6 SEC (ref 9–12)
SODIUM SERPL-SCNC: 133 MMOL/L (ref 137–145)
SP GR UR: 1.01 (ref 1–1.03)
UROBILINOGEN UR QL STRIP: <2 MG/DL (ref ?–2)
WBC # BLD AUTO: 4.1 K/UL (ref 3.8–10.6)

## 2021-03-12 PROCEDURE — 36415 COLL VENOUS BLD VENIPUNCTURE: CPT

## 2021-03-12 PROCEDURE — 49083 ABD PARACENTESIS W/IMAGING: CPT

## 2021-03-12 PROCEDURE — 83735 ASSAY OF MAGNESIUM: CPT

## 2021-03-12 PROCEDURE — 82947 ASSAY GLUCOSE BLOOD QUANT: CPT

## 2021-03-12 PROCEDURE — 85730 THROMBOPLASTIN TIME PARTIAL: CPT

## 2021-03-12 PROCEDURE — 70450 CT HEAD/BRAIN W/O DYE: CPT

## 2021-03-12 PROCEDURE — 81003 URINALYSIS AUTO W/O SCOPE: CPT

## 2021-03-12 PROCEDURE — 85049 AUTOMATED PLATELET COUNT: CPT

## 2021-03-12 PROCEDURE — 85610 PROTHROMBIN TIME: CPT

## 2021-03-12 PROCEDURE — 99284 EMERGENCY DEPT VISIT MOD MDM: CPT

## 2021-03-12 PROCEDURE — 80053 COMPREHEN METABOLIC PANEL: CPT

## 2021-03-12 PROCEDURE — 71046 X-RAY EXAM CHEST 2 VIEWS: CPT

## 2021-03-12 PROCEDURE — 85025 COMPLETE CBC W/AUTO DIFF WBC: CPT

## 2021-03-12 PROCEDURE — 82565 ASSAY OF CREATININE: CPT

## 2021-03-12 PROCEDURE — 72125 CT NECK SPINE W/O DYE: CPT

## 2021-03-12 PROCEDURE — 83880 ASSAY OF NATRIURETIC PEPTIDE: CPT

## 2021-03-12 PROCEDURE — 83605 ASSAY OF LACTIC ACID: CPT

## 2021-03-12 PROCEDURE — 93005 ELECTROCARDIOGRAM TRACING: CPT

## 2021-03-12 PROCEDURE — 84484 ASSAY OF TROPONIN QUANT: CPT

## 2021-03-12 RX ADMIN — ALBUMIN HUMAN SCH MLS/HR: 0.25 SOLUTION INTRAVENOUS at 14:15

## 2021-03-12 RX ADMIN — ALBUMIN HUMAN SCH MLS/HR: 0.25 SOLUTION INTRAVENOUS at 13:56

## 2021-03-12 RX ADMIN — ALBUMIN HUMAN SCH MLS/HR: 0.25 SOLUTION INTRAVENOUS at 13:30

## 2021-03-12 RX ADMIN — ALBUMIN HUMAN SCH MLS/HR: 0.25 SOLUTION INTRAVENOUS at 13:15

## 2021-03-12 NOTE — ED
Fall HPI





- General


Chief Complaint: Fall


Stated Complaint: Fall,weakness


Time Seen by Provider: 03/12/21 21:42


Source: patient, EMS


Mode of arrival: EMS





- History of Present Illness


Initial Comments: 





Patient is a 75-year-old male, history of heart failure, diabetes, hypertension,

presenting to the emergency department via EMS after he had a fall today at 

home.  He is also complaining of weakness today.  Patient normally uses a walker

to help ambulate, he did not have it and he fell while in the bathroom.  He 

states he did hit his head on a stool.  He denies any loss of consciousness.  He

denies having a headache, no blurry vision, no neck pain, no chest pain.  He 

denies any pain in his extremities, no nausea or vomiting, no abdominal pain.  

Patient's son is here with him now and states that he just had a paracentesis 

done today, he is typically weaker, the next day, following the paracentesis.  

He's had no fevers or chills.  Patient has no specific complaints at this time 

other than feeling weaker.  Upon arrival to the ER, his vital signs are stable.





- Related Data


                                Home Medications











 Medication  Instructions  Recorded  Confirmed


 


Tamsulosin [Flomax] 0.4 mg PO HS 12/04/17 03/12/21


 


Isosorbide Mononitrate ER [Imdur] 30 mg PO QAM 09/05/18 03/12/21


 


Cyanocobalamin (Vitamin B-12) 5,000 mcg PO DAILY 06/26/20 03/12/21





[Vitamin B-12]   


 


Nitroglycerin Sl Tab (0.3mg) 0.3 mg SUBLINGUAL Q5M PRN 06/26/20 03/12/21


 


Cholecalciferol [Vitamin D3 (25 1,000 unit PO DAILY 08/17/20 03/12/21





Mcg = 1000 Iu)]   


 


Ascorbic Acid [Vitamin C] 1,000 mg PO DAILY 11/11/20 03/12/21


 


Aspirin [Adult Low Dose Aspirin EC] 81 mg PO DAILY 11/11/20 03/12/21


 


Ferrous Sulfate [Iron (65  mg PO DAILY 11/11/20 03/12/21





Elemental)]   








                                  Previous Rx's











 Medication  Instructions  Recorded


 


carvediloL [Coreg*] 12.5 mg PO BID-W/MEALS  tab 07/01/20


 


Furosemide [Lasix] 40 mg PO QAM #0 12/25/20


 


Potassium Gluconate 99 mg PO DAILY #0 12/25/20


 


Spironolactone [Aldactone] 50 mg PO BID #0 12/25/20











                                    Allergies











Allergy/AdvReac Type Severity Reaction Status Date / Time


 


No Known Allergies Allergy   Verified 03/12/21 21:57














Review of Systems


ROS Statement: 


Those systems with pertinent positive or pertinent negative responses have been 

documented in the HPI.





ROS Other: All systems not noted in ROS Statement are negative.





Past Medical History


Past Medical History: Chest Pain / Angina, Heart Failure, Diabetes Mellitus, 

Hyperlipidemia, Hypertension, Osteoarthritis (OA)


Additional Past Medical History / Comment(s): incontinent of stools 

intermittently. low iron levels, possible GI bleed-dark tarry stools per son, 

Son stated "has had recent falls related to blood sugar going low 60s" insulin 

was discontinued-HX 2017 and 2018 had episodes of CHF approx 7-10 days post 

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 

disease


History of Any Multi-Drug Resistant Organisms: None Reported


Past Surgical History: Hernia Repair


Additional Past Surgical History / Comment(s): 9/11/18 robot assisted 

laprascopic umbilical hernia repair with mesh., 6/10/19 repair recurrent 

incarerated hernia, paracentesis times 2


Past Anesthesia/Blood Transfusion Reactions: No Reported Reaction


Additional Past Anesthesia/Blood Transfusion Reaction / Comment(s): no previous 

blood transfusion


Past Psychological History: Depression


Smoking Status: Current some day smoker


Past Alcohol Use History: None Reported


Past Drug Use History: None Reported





- Past Family History


  ** Mother


Family Medical History: Cancer


Additional Family Medical History / Comment(s): breast cancer





  ** Father


Family Medical History: Unable to Obtain





  ** Sister(s)


Family Medical History: Cancer





  ** Brother(s)


Family Medical History: Diabetes Mellitus





General Exam





- General Exam Comments


Initial Comments: 





GENERAL: 


Patient is well-developed and well-nourished.  Patient is nontoxic and in no 

acute distress.





HEAD: 


Atraumatic, normocephalic.  There are no hematomas, no signs of basal skull 

fracture.





EYES:


Pupils equal round and reactive to light, extraocular movements intact, sclera 

anicteric, conjunctiva are normal.  Eyelids were unremarkable.





ENT: 


TMs normal, nares patent, oropharynx clear without exudates.  Moist mucous 

membranes.





NECK: 


Normal range of motion, supple without lymphadenopathy or JVD.  There is no 

midline tenderness.





LUNGS:


Unlabored respirations.  Breath sounds clear to auscultation bilaterally and 

equal.  No wheezes rales or rhonchi.





HEART:


Regular rate and rhythm without murmurs, rubs or gallops.





ABDOMEN: 


Soft, nontender, normoactive bowel sounds.  No guarding, no rebound.  No masses 

appreciated.





: Deferred 





MUSCULOSKELETAL: 


Normal extremities with adequate strength and normal range of motion, no pitting

 or edema.  No clubbing or cyanosis.





NEUROLOGICAL: 


Patient is alert and oriented x 3.  Motor and sensory are also intact.  Cranial 

nerves II through XII grossly intact.  Symmetrical smile.  Normal speech, normal

 gait.   





PSYCH:


Normal mood, normal affect.





SKIN:


 Warm, Dry, normal turgor, no rashes or lesions noted.


Limitations: physical limitation





Course


                                   Vital Signs











  03/12/21 03/12/21





  21:45 23:00


 


Temperature 99.1 F 


 


Pulse Rate 82 84


 


Respiratory 20 20





Rate  


 


Blood Pressure 120/58 100/56


 


O2 Sat by Pulse 99 96





Oximetry  














Medical Decision Making





- Medical Decision Making





Patient is a 75-year-old male here via EMS after he fell in his bathroom today. 

 No loss of consciousness.  Patient has no specific complaints at this time 

other than feeling weaker today.  He did have a paracentesis today.  His vital 

signs are stable his exam shows no acute findings.  EKG showed no acute signs of

 acute ischemia, etc. his baseline.  Lab work is also stable, normal white c

ount, kidney function stable, troponin is normal, BNP is 6000 which is his 

normal.  Urine shows no evidence of infection.  Chest x-ray shows no acute 

process, CT of the brain/C-spine shows no acute process.  Patient received half 

a liter bolus of fluids.  His vitals have remained stable here in the ER.  

Patient was able to ambulate with a walker to the restroom.  Patient's son and 

wife are here with him now and.  With him coming home.  Patient does not want to

 stay in the hospital, he wants to go home.  Patient is stable for discharge.  I

 did recommend follow-up with their regular doctor in 1-2 days.  Family is in 

agreement with this plan of care.  Return parameters were discussed with them 

and they verbalized understanding.  Case discussed Dr. Bassett. 





- Lab Data


Result diagrams: 


                                 03/12/21 22:20





                                 03/12/21 22:20


                                   Lab Results











  03/12/21 03/12/21 03/12/21 Range/Units





  22:20 22:20 22:20 


 


WBC  4.1    (3.8-10.6)  k/uL


 


RBC  3.00 L    (4.30-5.90)  m/uL


 


Hgb  9.0 L    (13.0-17.5)  gm/dL


 


Hct  26.5 L    (39.0-53.0)  %


 


MCV  88.1    (80.0-100.0)  fL


 


MCH  30.1    (25.0-35.0)  pg


 


MCHC  34.2    (31.0-37.0)  g/dL


 


RDW  14.1    (11.5-15.5)  %


 


Plt Count  170    (150-450)  k/uL


 


MPV  6.9    


 


Neutrophils %  79    %


 


Lymphocytes %  12    %


 


Monocytes %  6    %


 


Eosinophils %  1    %


 


Basophils %  1    %


 


Neutrophils #  3.2    (1.3-7.7)  k/uL


 


Lymphocytes #  0.5 L    (1.0-4.8)  k/uL


 


Monocytes #  0.2    (0-1.0)  k/uL


 


Eosinophils #  0.0    (0-0.7)  k/uL


 


Basophils #  0.0    (0-0.2)  k/uL


 


PT   11.6   (9.0-12.0)  sec


 


INR   1.1   (<1.2)  


 


APTT   27.4   (22.0-30.0)  sec


 


Sodium     (137-145)  mmol/L


 


Potassium     (3.5-5.1)  mmol/L


 


Chloride     ()  mmol/L


 


Carbon Dioxide     (22-30)  mmol/L


 


Anion Gap     mmol/L


 


BUN     (9-20)  mg/dL


 


Creatinine     (0.66-1.25)  mg/dL


 


Est GFR (CKD-EPI)AfAm     (>60 ml/min/1.73 sqM)  


 


Est GFR (CKD-EPI)NonAf     (>60 ml/min/1.73 sqM)  


 


Glucose     (74-99)  mg/dL


 


Plasma Lactic Acid Jesse     (0.7-2.0)  mmol/L


 


Calcium     (8.4-10.2)  mg/dL


 


Magnesium     (1.6-2.3)  mg/dL


 


Total Bilirubin     (0.2-1.3)  mg/dL


 


AST     (17-59)  U/L


 


ALT     (4-49)  U/L


 


Alkaline Phosphatase     ()  U/L


 


Troponin I     (0.000-0.034)  ng/mL


 


NT-Pro-B Natriuret Pep     pg/mL


 


Total Protein     (6.3-8.2)  g/dL


 


Albumin     (3.5-5.0)  g/dL


 


Urine Color    Yellow  


 


Urine Appearance    Clear  (Clear)  


 


Urine pH    5.0  (5.0-8.0)  


 


Ur Specific Gravity    1.008  (1.001-1.035)  


 


Urine Protein    Negative  (Negative)  


 


Urine Glucose (UA)    Negative  (Negative)  


 


Urine Ketones    Negative  (Negative)  


 


Urine Blood    Negative  (Negative)  


 


Urine Nitrite    Negative  (Negative)  


 


Urine Bilirubin    Negative  (Negative)  


 


Urine Urobilinogen    <2.0  (<2.0)  mg/dL


 


Ur Leukocyte Esterase    Negative  (Negative)  














  03/12/21 03/12/21 03/12/21 Range/Units





  22:20 22:20 22:20 


 


WBC     (3.8-10.6)  k/uL


 


RBC     (4.30-5.90)  m/uL


 


Hgb     (13.0-17.5)  gm/dL


 


Hct     (39.0-53.0)  %


 


MCV     (80.0-100.0)  fL


 


MCH     (25.0-35.0)  pg


 


MCHC     (31.0-37.0)  g/dL


 


RDW     (11.5-15.5)  %


 


Plt Count     (150-450)  k/uL


 


MPV     


 


Neutrophils %     %


 


Lymphocytes %     %


 


Monocytes %     %


 


Eosinophils %     %


 


Basophils %     %


 


Neutrophils #     (1.3-7.7)  k/uL


 


Lymphocytes #     (1.0-4.8)  k/uL


 


Monocytes #     (0-1.0)  k/uL


 


Eosinophils #     (0-0.7)  k/uL


 


Basophils #     (0-0.2)  k/uL


 


PT     (9.0-12.0)  sec


 


INR     (<1.2)  


 


APTT     (22.0-30.0)  sec


 


Sodium  133 L    (137-145)  mmol/L


 


Potassium  4.4    (3.5-5.1)  mmol/L


 


Chloride  103    ()  mmol/L


 


Carbon Dioxide  24    (22-30)  mmol/L


 


Anion Gap  6    mmol/L


 


BUN  35 H    (9-20)  mg/dL


 


Creatinine  1.48 H    (0.66-1.25)  mg/dL


 


Est GFR (CKD-EPI)AfAm  53    (>60 ml/min/1.73 sqM)  


 


Est GFR (CKD-EPI)NonAf  45    (>60 ml/min/1.73 sqM)  


 


Glucose  121 H    (74-99)  mg/dL


 


Plasma Lactic Acid Jesse   1.1   (0.7-2.0)  mmol/L


 


Calcium  7.6 L    (8.4-10.2)  mg/dL


 


Magnesium  1.8    (1.6-2.3)  mg/dL


 


Total Bilirubin  0.5    (0.2-1.3)  mg/dL


 


AST  12 L    (17-59)  U/L


 


ALT  6    (4-49)  U/L


 


Alkaline Phosphatase  82    ()  U/L


 


Troponin I    <0.012  (0.000-0.034)  ng/mL


 


NT-Pro-B Natriuret Pep     pg/mL


 


Total Protein  4.8 L    (6.3-8.2)  g/dL


 


Albumin  2.3 L    (3.5-5.0)  g/dL


 


Urine Color     


 


Urine Appearance     (Clear)  


 


Urine pH     (5.0-8.0)  


 


Ur Specific Gravity     (1.001-1.035)  


 


Urine Protein     (Negative)  


 


Urine Glucose (UA)     (Negative)  


 


Urine Ketones     (Negative)  


 


Urine Blood     (Negative)  


 


Urine Nitrite     (Negative)  


 


Urine Bilirubin     (Negative)  


 


Urine Urobilinogen     (<2.0)  mg/dL


 


Ur Leukocyte Esterase     (Negative)  














  03/12/21 Range/Units





  22:20 


 


WBC   (3.8-10.6)  k/uL


 


RBC   (4.30-5.90)  m/uL


 


Hgb   (13.0-17.5)  gm/dL


 


Hct   (39.0-53.0)  %


 


MCV   (80.0-100.0)  fL


 


MCH   (25.0-35.0)  pg


 


MCHC   (31.0-37.0)  g/dL


 


RDW   (11.5-15.5)  %


 


Plt Count   (150-450)  k/uL


 


MPV   


 


Neutrophils %   %


 


Lymphocytes %   %


 


Monocytes %   %


 


Eosinophils %   %


 


Basophils %   %


 


Neutrophils #   (1.3-7.7)  k/uL


 


Lymphocytes #   (1.0-4.8)  k/uL


 


Monocytes #   (0-1.0)  k/uL


 


Eosinophils #   (0-0.7)  k/uL


 


Basophils #   (0-0.2)  k/uL


 


PT   (9.0-12.0)  sec


 


INR   (<1.2)  


 


APTT   (22.0-30.0)  sec


 


Sodium   (137-145)  mmol/L


 


Potassium   (3.5-5.1)  mmol/L


 


Chloride   ()  mmol/L


 


Carbon Dioxide   (22-30)  mmol/L


 


Anion Gap   mmol/L


 


BUN   (9-20)  mg/dL


 


Creatinine   (0.66-1.25)  mg/dL


 


Est GFR (CKD-EPI)AfAm   (>60 ml/min/1.73 sqM)  


 


Est GFR (CKD-EPI)NonAf   (>60 ml/min/1.73 sqM)  


 


Glucose   (74-99)  mg/dL


 


Plasma Lactic Acid Jesse   (0.7-2.0)  mmol/L


 


Calcium   (8.4-10.2)  mg/dL


 


Magnesium   (1.6-2.3)  mg/dL


 


Total Bilirubin   (0.2-1.3)  mg/dL


 


AST   (17-59)  U/L


 


ALT   (4-49)  U/L


 


Alkaline Phosphatase   ()  U/L


 


Troponin I   (0.000-0.034)  ng/mL


 


NT-Pro-B Natriuret Pep  6080  pg/mL


 


Total Protein   (6.3-8.2)  g/dL


 


Albumin   (3.5-5.0)  g/dL


 


Urine Color   


 


Urine Appearance   (Clear)  


 


Urine pH   (5.0-8.0)  


 


Ur Specific Gravity   (1.001-1.035)  


 


Urine Protein   (Negative)  


 


Urine Glucose (UA)   (Negative)  


 


Urine Ketones   (Negative)  


 


Urine Blood   (Negative)  


 


Urine Nitrite   (Negative)  


 


Urine Bilirubin   (Negative)  


 


Urine Urobilinogen   (<2.0)  mg/dL


 


Ur Leukocyte Esterase   (Negative)  














- EKG Data


EKG Comments: 





Sinus rhythm with first-degree AV block, left axis deviation, right BBB, 

inferior infarct age undetermined.  No signs of acute ischemia.  This is similar

 to his previous EKG on 12/23/2020.  Ventricular rate 84, MN interval 250, QTC 

416.





Disposition


Clinical Impression: 


 Fall, Weakness





Disposition: HOME SELF-CARE


Condition: Stable


Instructions (If sedation given, give patient instructions):  Fall Prevention 

for Older Adults (ED)


Additional Instructions: 


Please return to the Emergency Department if symptoms worsen or any other 

concerns.


Please use walker for ambulation to prevent future falls.  


Follow-up with your regular doctor in 1-3 days. 


Is patient prescribed a controlled substance at d/c from ED?: No


Referrals: 


Merry Grimaldo MD [Primary Care Provider] - 1-2 days

## 2021-03-12 NOTE — US
EXAMINATION TYPE: US paracentesis abd w/image

 

DATE OF EXAM: 3/12/2021

 

COMPARISON: NONE

 

HISTORY: Ascites.

 

PROCEDURE:

Maximal barrier technique was utilized.  The skin overlying a suitable pocket of fluid was localized 
with ultrasound and the overlying skin was prepped and draped.  Ultrasound was utilized with sterile 
technique. Lidocaine was used for local anesthesia and a skin nick made with a scalpel. Catheter was 
advanced under direct ultrasound guidance into a suitable pocket of fluid and approximately 6.6 liter
s of serous fluid were removed.  Catheter was withdrawn and hemostasis achieved.  There is no immedia
te complication; the patient is discharged in stable condition. 

 

IMPRESSION:  STATUS POST ULTRASOUND GUIDED PARACENTESIS FOR PALLIATION OF ASCITES.  THIS PROCEDURE WA
S PERFORMED BY THE UNDERSIGNED.

## 2021-03-12 NOTE — XR
EXAMINATION TYPE: XR chest 2V

 

DATE OF EXAM: 3/12/2021

 

COMPARISON: NONE

 

HISTORY: Weakness

 

TECHNIQUE: 2 views

 

FINDINGS: There is no heart failure nor confluent pneumonic infiltrate. Costophrenic angles are clear
. There are no hilar masses. Thoracic aorta is atheromatous.

 

IMPRESSION: No active cardiopulmonary disease. Atheromatous aorta.

## 2021-03-12 NOTE — CT
EXAMINATION TYPE: CT brain cspine wo con

 

DATE OF EXAM: 3/12/2021

 

COMPARISON: None

 

HISTORY: fall, ams, confusion

 

CT DLP: 1337.8 mGycm

Automated exposure control for dose reduction was used.

 

There is cerebral atrophy. There is no mass effect nor midline shift. There is no sign of intracrania
l hemorrhage. There is mild prominence of the ventricles. There is some hypodensity in the periventri
cular white matter. The calvarium is intact.

 

The cervical vertebra show mild kyphotic curvature. There is degenerative disc space narrowing from C
3 to C7. There is mild spurring of the endplates. Facet joints are intact. I see no bony destructive 
process.

 

IMPRESSION:

Cerebral atrophy and chronic small vessel ischemia. No acute intracranial abnormality.

 

Cervical multilevel spondylotic changes. No fracture.

## 2021-03-13 VITALS
SYSTOLIC BLOOD PRESSURE: 97 MMHG | TEMPERATURE: 98 F | HEART RATE: 82 BPM | RESPIRATION RATE: 18 BRPM | DIASTOLIC BLOOD PRESSURE: 54 MMHG

## 2021-03-26 ENCOUNTER — HOSPITAL ENCOUNTER (OUTPATIENT)
Dept: HOSPITAL 47 - RADPROMAIN | Age: 76
Discharge: HOME | End: 2021-03-26
Attending: INTERNAL MEDICINE
Payer: MEDICARE

## 2021-03-26 VITALS — DIASTOLIC BLOOD PRESSURE: 61 MMHG | HEART RATE: 75 BPM | SYSTOLIC BLOOD PRESSURE: 121 MMHG

## 2021-03-26 VITALS — RESPIRATION RATE: 16 BRPM | TEMPERATURE: 97.9 F

## 2021-03-26 DIAGNOSIS — R18.8: Primary | ICD-10-CM

## 2021-03-26 LAB
GLUCOSE SERPL-MCNC: 164 MG/DL (ref 74–99)
INR PPP: 1 (ref ?–1.2)
PLATELET # BLD AUTO: 286 K/UL (ref 150–450)
PT BLD: 10.9 SEC (ref 9–12)

## 2021-03-26 PROCEDURE — 85610 PROTHROMBIN TIME: CPT

## 2021-03-26 PROCEDURE — 49083 ABD PARACENTESIS W/IMAGING: CPT

## 2021-03-26 PROCEDURE — 82947 ASSAY GLUCOSE BLOOD QUANT: CPT

## 2021-03-26 PROCEDURE — 85049 AUTOMATED PLATELET COUNT: CPT

## 2021-03-26 PROCEDURE — 82565 ASSAY OF CREATININE: CPT

## 2021-03-26 PROCEDURE — 36415 COLL VENOUS BLD VENIPUNCTURE: CPT

## 2021-03-26 RX ADMIN — ALBUMIN HUMAN SCH MLS/HR: 0.25 SOLUTION INTRAVENOUS at 13:06

## 2021-03-26 RX ADMIN — ALBUMIN HUMAN SCH: 0.25 SOLUTION INTRAVENOUS at 15:21

## 2021-03-26 RX ADMIN — ALBUMIN HUMAN SCH MLS/HR: 0.25 SOLUTION INTRAVENOUS at 13:22

## 2021-03-26 RX ADMIN — ALBUMIN HUMAN SCH MLS/HR: 0.25 SOLUTION INTRAVENOUS at 14:02

## 2021-03-26 NOTE — US
EXAMINATION TYPE: US paracentesis abd w/image

 

DATE OF EXAM: 3/26/2021

 

COMPARISON: NONE

 

HISTORY: Ascites.

 

PROCEDURE:

Maximal barrier technique was utilized.  The skin overlying a suitable pocket of fluid was localized 
with ultrasound and the overlying skin was prepped and draped.  Ultrasound was utilized with sterile 
technique. Lidocaine was used for local anesthesia and a skin nick made with a scalpel. Catheter was 
advanced under direct ultrasound guidance into a suitable pocket of fluid and approximately 4 liters 
of serous fluid were removed.  Catheter was withdrawn and hemostasis achieved.  There is no immediate
 complication; the patient is discharged in stable condition. 

 

IMPRESSION:  STATUS POST ULTRASOUND GUIDED PARACENTESIS FOR PALLIATION OF ASCITES.  THIS PROCEDURE WA
S PERFORMED BY THE UNDERSIGNED.

## 2023-05-10 NOTE — P.PCN
Date of Procedure: 03/06/20


Description of Procedure: 





BRIEF HISTORY: 


74-year-old male with multiple medical comorbidities including hypertension, 

osteoarthritis, hyperlipidemia, congestive heart failure, obesity, colonic 

polyps colonoscopy and gastric and esophageal varices on EGD who presented to 

the hospital with complaints of lethargy.  At that time the patient had been 

complaining of weakness, decreased appetite and episodes of confusion.  He was 

found to have elevation in his troponins and is currently being medically 

managed by the cardiology service.  In addition the patient is also receiving 

antibiotic therapy for a urinary tract infection.  He had a computed tomography 

scan of the abdomen performed in evaluation of his symptoms with findings 

concerning for a sigmoid mass.  However on review of the electronic medical 

record the patient had EGD and colonoscopy performed in 01/2020 with EGD showing

findings of esophageal and gastric varices and colonoscopy with polypectomy as 

stated.  The prep for the colonoscopy was deemed fair.  He also has a history of

chronic anemia and given the above the hematology/oncology service has been 

consult.





PROCEDURE PERFORMED: 


Flexible sigmoidoscopy. 





PREOPERATIVE DIAGNOSIS: 


Abnormal CT imaging of the abdomen (concern for sigmoid mass on computed naima

graphy scan). 





ESTIMATED BLOOD LOSS: 


Minimal.





IV sedation per Anesthesia. 





PROCEDURE: 


After informed consent was obtained, the patient, was brought into the endoscopy

unit. IV sedation was administered by Anesthesia under continuous monitoring.  

Digital rectal examination was normal. Initially the Olympus CF-190 flexible 

video colonoscope was then inserted in the rectum, gradually advanced into the 

descending colon to the splenic flexure.  The prep was fair with a large amount 

of retained stool.  The colonoscope was then slowly withdrawn with the patient 

noted to have some diverticulosis and a fairly redundant sigmoid colon however 

no gross mass was noted to correlate with the computed tomography scan findings.

 Retroflexion in the rectum was significant for moderate internal hemorrhoids.  

The patient tolerated the procedure well.





IMPRESSION: 


No gross mass noted on flexible sigmoidoscopy.


Left colonic diverticulosis.


Moderate internal hemorrhoids.


Fair prep.





RECOMMENDATIONS:  


Findings of this examination were discussed with the patient and his son.  Okay 

to resume diet.  Okay to resume medications.  Continue workup for other medical 

services.  Okay for discharge from gastroenterology when otherwise stable. Bill For Surgical Tray: no

## 2024-02-27 NOTE — ED
General Adult HPI





- General


Chief complaint: Altered Mental Status


Stated complaint: sepsis


Time Seen by Provider: 08/17/20 16:30


Source: patient, EMS, RN notes reviewed, old records reviewed


Mode of arrival: EMS


Limitations: no limitations





- History of Present Illness


Initial comments: 





75-year-old male, very poor historian presented for evaluation of dyspnea, fever

and altered level consciousness.  Patient is aware that he is in the hospital 

but was unable to answer questions regarding his illness.  He denies a physical 

complaint of pain.  There was no reported vomiting.  Apparently EMS did note 

that the patient's abdomen seemed distended, uncertain if this is baseline or 

not.  Medical record will be reviewed.





- Related Data


                                Home Medications











 Medication  Instructions  Recorded  Confirmed


 


Benazepril HCl 40 mg PO QAM 12/03/17 07/27/20


 


Aspirin 81 mg PO DAILY 12/04/17 07/27/20


 


Tamsulosin [Flomax] 0.4 mg PO HS 12/04/17 07/27/20


 


Isosorbide Mononitrate ER [Imdur] 30 mg PO QAM 09/05/18 07/27/20


 


Cyanocobalamin (Vitamin B-12) 1,000 mcg PO DAILY 06/26/20 07/27/20





[Vitamin B-12]   


 


Nitroglycerin Sl Tab (0.3mg) 0.3 mg SUBLINGUAL Q5M PRN 06/26/20 07/27/20


 


Furosemide [Lasix] 40 mg PO TID 07/27/20 07/27/20


 


INSULIN ASPART (NovoLOG) [NovoLOG 4 unit SQ AC-TID 07/27/20 07/27/20





(formulary)]   


 


Insulin Glargine,Hum.rec.anlog 40 unit SQ QAM 07/27/20 07/27/20





[Basaglar Kwikpen U-100]   


 


Multivitamins, Thera [Multivitamin 1 tab PO DAILY 07/27/20 07/27/20





(formulary)]   


 


Spironolactone [Aldactone] 25 mg PO BID 07/27/20 07/27/20








                                  Previous Rx's











 Medication  Instructions  Recorded


 


Ferrous Sulfate [Iron (65  mg PO DAILY #30 tab 12/03/18





Elemental)]  


 


ALPRAZolam [Xanax] 0.25 mg PO Q6HR PRN  tab 03/07/20


 


carvediloL [Coreg*] 12.5 mg PO BID-W/MEALS  tab 07/01/20


 


Cefuroxime Axetil [Ceftin] 500 mg PO BID 10 Days #20 tab 07/31/20











                                    Allergies











Allergy/AdvReac Type Severity Reaction Status Date / Time


 


No Known Allergies Allergy   Verified 07/27/20 20:26














Review of Systems


ROS Statement: 


Those systems with pertinent positive or pertinent negative responses have been 

documented in the HPI.





ROS Other: All systems not noted in ROS Statement are negative.





Past Medical History


Past Medical History: Chest Pain / Angina, Heart Failure, CVA/TIA, Diabetes 

Mellitus, Hyperlipidemia, Hypertension, Osteoarthritis (OA)


Additional Past Medical History / Comment(s): incontinent of stools intermittent

ly. low iron levels, possible GI bleed-dark tarry stools per son, Son stated 

"has had recent falls related to blood sugar going low 60s"-HX 2017 and 2018 had

 episodes of CHF approx 7-10 days post receiving flu vaccine,TIA,Type2 IDDM


History of Any Multi-Drug Resistant Organisms: None Reported


Past Surgical History: Hernia Repair


Additional Past Surgical History / Comment(s): 9/11/18 robot assisted 

laprascopic umbilical hernia repair with mesh., 6/10/19 repair recurrent 

incarerated hernia


Past Anesthesia/Blood Transfusion Reactions: No Reported Reaction


Additional Past Anesthesia/Blood Transfusion Reaction / Comment(s): never had 

anesthesia


Past Psychological History: Depression


Smoking Status: Current every day smoker





- Past Family History


  ** Mother


Family Medical History: Cancer


Additional Family Medical History / Comment(s): breast cancer





  ** Father


Family Medical History: Unable to Obtain





  ** Sister(s)


Family Medical History: Cancer





  ** Brother(s)


Family Medical History: Diabetes Mellitus





General Exam


Limitations: no limitations


General appearance: alert, in distress


Head exam: Present: atraumatic, normocephalic


Eye exam: Present: PERRL


Respiratory exam: Present: respiratory distress, rales, rhonchi


Cardiovascular Exam: Present: regular rate, normal rhythm


GI/Abdominal exam: Present: soft, distended, tenderness ( right upper quadrant 

and epigastric tenderness)


 exam: Present: normal inspection.  Absent: testicular tenderness, scrotal 

swelling


Extremities exam: Present: normal inspection, normal capillary refill


Neurological exam: Present: alert.  Absent: oriented X3, motor sensory deficit 

(Patient  moving all extremities symmetrically)


Skin exam: Present: warm, diaphoretic





Course


                                   Vital Signs











  08/17/20





  16:19


 


Temperature 102.7 F H


 


Pulse Rate 89


 


Respiratory 18





Rate 


 


Blood Pressure 105/49


 


O2 Sat by Pulse 97





Oximetry 














EKG Findings





- EKG Comments:


EKG Findings:: EKG: Sinus rhythm with a first-degree AV block, right bundle 

branch block, left anterior fascicular block, rate of 87, MT interval 276, QRS 

duration 154,  no ST segment elevation.





Medical Decision Making





- Medical Decision Making





75-year-old male presenting with fever, confusion.  This patient did appear to 

have some right upper quadrant tenderness.  Workup was initiated.  Head CT which

 is negative for intracranial hemorrhage or mass effect, chest x-ray negative 

for focal pneumonia.  He has mild leukocytosis at 11.8.  He has a normal lactic 

acid, creatinine 1.3 which is baseline for this patient.  He has elevated BNP of

 6300, and albumin 2.5.  He was worked up for spontaneous bacterial peritonitis 

on a recent admission approximately 3 weeks ago.  Review the cultures are 

peritoneal fluid were negative.  Ultrasound was performed which does show 

concern for an acalculous cholecystitis with gallbladder wall thickening.  Given

 the fever.  He he was initiated on antibiotics, Zosyn and vancomycin.  Case was

 discussed with Dr. Tafoya who will admit.  I did also discuss the case with 

 regarding the gallbladder findings.  Patient given bolus and placed 

on normal saline at 130 mL/h.





- Lab Data


Result diagrams: 


                                 08/17/20 16:50





                                 08/17/20 16:50


                                   Lab Results











  08/17/20 08/17/20 08/17/20 Range/Units





  16:50 16:50 16:50 


 


WBC  11.8 H    (3.8-10.6)  k/uL


 


RBC  3.80 L    (4.30-5.90)  m/uL


 


Hgb  9.8 L    (13.0-17.5)  gm/dL


 


Hct  31.4 L    (39.0-53.0)  %


 


MCV  82.7    (80.0-100.0)  fL


 


MCH  25.9    (25.0-35.0)  pg


 


MCHC  31.3    (31.0-37.0)  g/dL


 


RDW  16.2 H    (11.5-15.5)  %


 


Plt Count  235    (150-450)  k/uL


 


Neutrophils %  95    %


 


Lymphocytes %  1    %


 


Monocytes %  2    %


 


Eosinophils %  1    %


 


Basophils %  0    %


 


Neutrophils #  11.2 H    (1.3-7.7)  k/uL


 


Lymphocytes #  0.2 L    (1.0-4.8)  k/uL


 


Monocytes #  0.3    (0-1.0)  k/uL


 


Eosinophils #  0.1    (0-0.7)  k/uL


 


Basophils #  0.0    (0-0.2)  k/uL


 


Anisocytosis  Slight    


 


PT   11.0   (9.0-12.0)  sec


 


INR   1.1   (<1.2)  


 


APTT   22.2   (22.0-30.0)  sec


 


VBG pH     (7.31-7.41)  


 


VBG pCO2     (37-51)  mmHg


 


VBG HCO3     (24-28)  mmol/L


 


Sodium    133 L  (137-145)  mmol/L


 


Potassium    3.4 L  (3.5-5.1)  mmol/L


 


Chloride    99  ()  mmol/L


 


Carbon Dioxide    26  (22-30)  mmol/L


 


Anion Gap    8  mmol/L


 


BUN    34 H  (9-20)  mg/dL


 


Creatinine    1.33 H  (0.66-1.25)  mg/dL


 


Est GFR (CKD-EPI)AfAm    60  (>60 ml/min/1.73 sqM)  


 


Est GFR (CKD-EPI)NonAf    52  (>60 ml/min/1.73 sqM)  


 


Glucose    55 L  (74-99)  mg/dL


 


Plasma Lactic Acid Jesse     (0.7-2.0)  mmol/L


 


Calcium    7.9 L  (8.4-10.2)  mg/dL


 


Total Bilirubin    0.8  (0.2-1.3)  mg/dL


 


AST    21  (17-59)  U/L


 


ALT    14  (4-49)  U/L


 


Alkaline Phosphatase    252 H  ()  U/L


 


Ammonia     (<30)  umol/L


 


NT-Pro-B Natriuret Pep     pg/mL


 


Total Protein    5.7 L  (6.3-8.2)  g/dL


 


Albumin    2.5 L  (3.5-5.0)  g/dL


 


Amylase    37  ()  U/L


 


Lipase    94  ()  U/L


 


Blood Type     


 


Blood Type Recheck     


 


Bld Type Recheck Status     


 


Antibody Screen     


 


Spec Expiration Date     














  08/17/20 08/17/20 08/17/20 Range/Units





  16:50 16:50 16:50 


 


WBC     (3.8-10.6)  k/uL


 


RBC     (4.30-5.90)  m/uL


 


Hgb     (13.0-17.5)  gm/dL


 


Hct     (39.0-53.0)  %


 


MCV     (80.0-100.0)  fL


 


MCH     (25.0-35.0)  pg


 


MCHC     (31.0-37.0)  g/dL


 


RDW     (11.5-15.5)  %


 


Plt Count     (150-450)  k/uL


 


Neutrophils %     %


 


Lymphocytes %     %


 


Monocytes %     %


 


Eosinophils %     %


 


Basophils %     %


 


Neutrophils #     (1.3-7.7)  k/uL


 


Lymphocytes #     (1.0-4.8)  k/uL


 


Monocytes #     (0-1.0)  k/uL


 


Eosinophils #     (0-0.7)  k/uL


 


Basophils #     (0-0.2)  k/uL


 


Anisocytosis     


 


PT     (9.0-12.0)  sec


 


INR     (<1.2)  


 


APTT     (22.0-30.0)  sec


 


VBG pH     (7.31-7.41)  


 


VBG pCO2     (37-51)  mmHg


 


VBG HCO3     (24-28)  mmol/L


 


Sodium     (137-145)  mmol/L


 


Potassium     (3.5-5.1)  mmol/L


 


Chloride     ()  mmol/L


 


Carbon Dioxide     (22-30)  mmol/L


 


Anion Gap     mmol/L


 


BUN     (9-20)  mg/dL


 


Creatinine     (0.66-1.25)  mg/dL


 


Est GFR (CKD-EPI)AfAm     (>60 ml/min/1.73 sqM)  


 


Est GFR (CKD-EPI)NonAf     (>60 ml/min/1.73 sqM)  


 


Glucose     (74-99)  mg/dL


 


Plasma Lactic Acid Jesse  1.4    (0.7-2.0)  mmol/L


 


Calcium     (8.4-10.2)  mg/dL


 


Total Bilirubin     (0.2-1.3)  mg/dL


 


AST     (17-59)  U/L


 


ALT     (4-49)  U/L


 


Alkaline Phosphatase     ()  U/L


 


Ammonia  15    (<30)  umol/L


 


NT-Pro-B Natriuret Pep    6320  pg/mL


 


Total Protein     (6.3-8.2)  g/dL


 


Albumin     (3.5-5.0)  g/dL


 


Amylase     ()  U/L


 


Lipase     ()  U/L


 


Blood Type   B Positive   


 


Blood Type Recheck   B Pos   


 


Bld Type Recheck Status   No   


 


Antibody Screen   NEGATIVE   


 


Spec Expiration Date   08/20/2020 - 2350 08/17/20 Range/Units





  16:50 


 


WBC   (3.8-10.6)  k/uL


 


RBC   (4.30-5.90)  m/uL


 


Hgb   (13.0-17.5)  gm/dL


 


Hct   (39.0-53.0)  %


 


MCV   (80.0-100.0)  fL


 


MCH   (25.0-35.0)  pg


 


MCHC   (31.0-37.0)  g/dL


 


RDW   (11.5-15.5)  %


 


Plt Count   (150-450)  k/uL


 


Neutrophils %   %


 


Lymphocytes %   %


 


Monocytes %   %


 


Eosinophils %   %


 


Basophils %   %


 


Neutrophils #   (1.3-7.7)  k/uL


 


Lymphocytes #   (1.0-4.8)  k/uL


 


Monocytes #   (0-1.0)  k/uL


 


Eosinophils #   (0-0.7)  k/uL


 


Basophils #   (0-0.2)  k/uL


 


Anisocytosis   


 


PT   (9.0-12.0)  sec


 


INR   (<1.2)  


 


APTT   (22.0-30.0)  sec


 


VBG pH  7.49 H  (7.31-7.41)  


 


VBG pCO2  34 L  (37-51)  mmHg


 


VBG HCO3  26  (24-28)  mmol/L


 


Sodium   (137-145)  mmol/L


 


Potassium   (3.5-5.1)  mmol/L


 


Chloride   ()  mmol/L


 


Carbon Dioxide   (22-30)  mmol/L


 


Anion Gap   mmol/L


 


BUN   (9-20)  mg/dL


 


Creatinine   (0.66-1.25)  mg/dL


 


Est GFR (CKD-EPI)AfAm   (>60 ml/min/1.73 sqM)  


 


Est GFR (CKD-EPI)NonAf   (>60 ml/min/1.73 sqM)  


 


Glucose   (74-99)  mg/dL


 


Plasma Lactic Acid Jesse   (0.7-2.0)  mmol/L


 


Calcium   (8.4-10.2)  mg/dL


 


Total Bilirubin   (0.2-1.3)  mg/dL


 


AST   (17-59)  U/L


 


ALT   (4-49)  U/L


 


Alkaline Phosphatase   ()  U/L


 


Ammonia   (<30)  umol/L


 


NT-Pro-B Natriuret Pep   pg/mL


 


Total Protein   (6.3-8.2)  g/dL


 


Albumin   (3.5-5.0)  g/dL


 


Amylase   ()  U/L


 


Lipase   ()  U/L


 


Blood Type   


 


Blood Type Recheck   


 


Bld Type Recheck Status   


 


Antibody Screen   


 


Spec Expiration Date   














Critical Care Time


Critical Care Time: Yes


Total Critical Care Time: 35





Disposition


Clinical Impression: 


 Altered mental status, Sepsis, Acalculous cholecystitis





Disposition: ADMITTED AS IP TO THIS \[facility]\""


Condition: Serious


Is patient prescribed a controlled substance at d/c from ED?: No


Referrals: 


Merry Grimaldo MD [Primary Care Provider] - 1-2 days


Decision to Admit Reason: Admit from EC


Decision Date: 08/17/20


Decision Time: 18:45
Improved